# Patient Record
Sex: FEMALE | Race: ASIAN | NOT HISPANIC OR LATINO | Employment: FULL TIME | URBAN - METROPOLITAN AREA
[De-identification: names, ages, dates, MRNs, and addresses within clinical notes are randomized per-mention and may not be internally consistent; named-entity substitution may affect disease eponyms.]

---

## 2017-09-06 ENCOUNTER — CARE COORDINATION (OUTPATIENT)
Dept: FAMILY MEDICINE | Facility: CLINIC | Age: 22
End: 2017-09-06

## 2017-09-06 ENCOUNTER — OFFICE VISIT (OUTPATIENT)
Dept: FAMILY MEDICINE | Facility: CLINIC | Age: 22
End: 2017-09-06

## 2017-09-06 VITALS
BODY MASS INDEX: 30.18 KG/M2 | DIASTOLIC BLOOD PRESSURE: 77 MMHG | HEART RATE: 73 BPM | HEIGHT: 62 IN | WEIGHT: 164 LBS | OXYGEN SATURATION: 99 % | SYSTOLIC BLOOD PRESSURE: 117 MMHG | TEMPERATURE: 97.9 F

## 2017-09-06 DIAGNOSIS — Z71.6 ENCOUNTER FOR TOBACCO USE CESSATION COUNSELING: ICD-10-CM

## 2017-09-06 DIAGNOSIS — N91.2 AMENORRHEA: Primary | ICD-10-CM

## 2017-09-06 DIAGNOSIS — Z34.01 ENCOUNTER FOR SUPERVISION OF NORMAL FIRST PREGNANCY IN FIRST TRIMESTER: ICD-10-CM

## 2017-09-06 PROBLEM — F17.200 TOBACCO USE DISORDER: Status: ACTIVE | Noted: 2017-09-06

## 2017-09-06 LAB — HCG UR QL: POSITIVE

## 2017-09-06 RX ORDER — PRENATAL VIT/IRON FUM/FOLIC AC 27MG-0.8MG
1 TABLET ORAL DAILY
COMMUNITY
End: 2017-11-06

## 2017-09-06 NOTE — PATIENT INSTRUCTIONS
- For nausea, you can try reji. If symptoms are persistent and bothersome, you can call for a medicine. Try eating small frequent meals.   - Continue your multivitamin. Make sure it has folic acid.   - Please try to quit smoking for your health and baby's health.   - Please call if you have any questions.     [facility] Information  If you have any further concerns or wish to schedule another appointment, please call our office at [phone] during normal business hours (8-5, M-F).     For uncomplicated pregnancies, you will be seeing your doctor once a month until you are 28 weeks, then you will see your doctor twice a month. You will begin weekly visits at 36 weeks until you deliver.     If you have urgent medical questions that cannot wait, you may call [phone] at any time of day. Call your doctor if you experience any bleeding or abdominal cramping early in pregnancy.     If you have a medical emergency, please call [phone].  Tips to Relieve Nausea  Although nausea can occur at any time of the day, it may be worse in the morning. To help prevent nausea:  Eat small, light meals at frequent intervals.  Get up slowly. Eat a few unsalted crackers before you get out of bed.  Drink water with lemon slices or 7-up to soothe your stomach.  Eat an ice pop in your favorite flavor.  Ask your health care provider about taking reji or vitamin B6 for nausea and vomiting.  Talk with your health care provider if you take vitamins that upset your stomach.  Safe Medications  Take one prenatal vitamin with at least 400 mcg of folic acid daily.  Use acetaminophen (Tylenol) for pain.   Try Maalox or Tums for heartburn. If these are not working, talk to your doctor about trying a different medication.  Diphenhydramine (Benadryl) can also be used safely in pregnancy.  Talk to your doctor about any other medications or vitamins you are taking!  Start Healthy Habits Now  What matters most is protecting your baby from  this moment on. If you smoke, drink alcohol, or use drugs, now is the time to stop. If you need help, talk with your health care provider.  Smoking increases the risk of miscarriage or having a low-birth-weight baby. If you smoke, quit now.  Alcohol and drugs have been linked with miscarriage, birth defects, intellectual disability, and low birth weight. Do not drink alcohol or take drugs.  Continue your current exercise routine, or start one with walking, swimming, and other low impact exercises. Yoga specifically designed for pregnant moms is a great stress and pain reliever. Keep your self well hydrated and avoid overheating with all activity. If bleeding, fluid leakage, or cramping/contractions occur, stop the exercise and call your doctor.   Wear your seatbelt every time you are in the car. Fasten the lap part underneath your growing belly and the shoulder part as you normally would.   Weight Gain  Add an additional 300 to 400 calories a day into your diet.  Ideal weight gain is 25 to 35 pounds.  If your BMI is over 30, your ideal weight gain is 15 pounds.  If your BMI is under 20, your ideal weight gain is 40 pounds.  Talk to your doctor if you are concerned about weight gain.   Keep Your Environment Save  You can still clean house and use scented products. Just take some simple precautions:  Wear gloves when using cleaning fluids.  Open windows to let in fresh air. Use a fan if you paint.  Avoid secondhand smoke.  Don t breathe fumes from nail polish, hair spray, cleansers, or other chemicals.  Work Concerns  The end of the first trimester is a good time to discuss working during pregnancy with your employer. Follow your health care provider s advice if your job requires you to stand for a long time, work with hazardous tools, or even sit at a desk all day. Your workspace, workload, or scheduled hours may need to be adjusted - perhaps you can change body postures more often or take an extra  break.  Intimacy  Unless your health care provider tells you to, there is no reason to stop having sex while you re pregnant. You or your partner may notice changes in desire. Desire may be less in the first trimester, due to nausea and fatigue. In the second trimester, sex may be very enjoyable. The third trimester can be a challenge comfort-wise. Try different positions and see what s best for you both. As always, let your doctor know if you experience any bleeding, leakage of fluids, or cramping/contractions.  Other Pregnancy Concerns  Limit the amount of radiation you are exposed to. Always tell the radiology technologist that you are pregnant if having an xray or CT scan done.   Practice good hand washing to prevent infection.  Avoid cat litter.   Wash all fruits and vegetabes. Always cook meat thoroughly and do not eat raw fish. Do not eat more than 6 to 12 ounces of other fish sources.   Limit caffeine to less than 300 milligrams a days. The average cup of coffee as approximately 120 milligrams of caffeine.      Breastfeeding: a Healthy Option for You and Your Baby    The choice of how you will feed your baby is important. Before your baby s birth, you ll want to learn about the benefits of breastfeeding. [facility] have been designated Baby Friendly; an initiative that was created by the World Health Organization and UNICEF. This helps give you and your baby the best start in feeding their baby.    Why should I breastfeed my baby?    Babies are less likely to develop childhood obesity or diabetes    Babies are less likely to suffer from recurrent ear infections    Babies are less likely to be hospitalized for respiratory conditions    Breast milk is rich in nutrients and antibodies-it is easy to digest    How does it benefit me?    Lowers the risk for diabetes, breast and ovarian cancer and postpartum depression    Moms can lose  baby weight  more quickly    Cost savings - formula can cost well  over $1,500 per year    Convenient - no bottles and nipples to sterilize, no measuring and mixing formula    The physical contact with breastfeeding can make babies feel secure, warm and comforted     What about formula?  While you and your baby are staying with us at [facility], we will support whatever feeding choice you make for your baby.      Some important considerations:      The American Academy of Pediatrics, the World Health Organization, and many more organizations recommend exclusive breastfeeding for 6 months and continued breastfeeding while adding other foods for the first 1-2 years.      Any amount of breastmilk has benefits to both baby and mother.    Giving formula in replacement of breastfeeding can affect mother s milk supply.  If formula is needed, hospital staff will work with you on a plan to help develop your milk supply.    Formula alters the natural growth of good bacteria in the  stomach.     Research has found that first time mothers who offer formula in the hospital have a shorter duration of breastfeeding.    How can I start to prepare?     Start by having a conversation with your medical provider.     Talk with your partner, family and friends.     Attend a prenatal class that includes breastfeeding preparation. Birth and breastfeeding classes are offered by [facility]. Visit [employer] for class information.     After your baby s birth, hospital staff and lactation consultants will help you and your baby get off to a great start with breastfeeding.    Resources:      [facility] clinic and hospital staff will support you throughout your pregnancy, delivery and beyond. Visit [email or web address] for more details.       A free weekly pregnancy and parenting email is offered by [facility]. Emails include week by week information about your pregnancy, baby s development, breastfeeding and beyond.  Sign up at  [email or web address].      [facility] [phone].  Consultants are available for assessment of your breastfeeding concerns, support and education.      [employer] helps mothers worldwide to breastfeed through mother-to-mother support, encouragement, information and education. [employer] promotes breastfeeding as an important element in the healthy development of baby and mother. Monthly classes, support groups and telephone help are offered in your community. To learn more visit [email or web address].      [facility] Care Connection: [phone]-[facility] ([phone])

## 2017-09-06 NOTE — MR AVS SNAPSHOT
After Visit Summary   9/6/2017    Rashmi Oliva    MRN: [id number]           Patient Information     Date Of Birth          1995        Visit Information        Provider Department      9/6/2017 9:20 AM Caridad House, DO [facility]        Today's Diagnoses     Amenorrhea    -  1    Encounter for supervision of normal first pregnancy in first trimester          Care Instructions    - For nausea, you can try reji. If symptoms are persistent and bothersome, you can call for a medicine. Try eating small frequent meals.   - Continue your multivitamin. Make sure it has folic acid.   - Please try to quit smoking for your health and baby's health.   - Please call if you have any questions.     [facility] Information  If you have any further concerns or wish to schedule another appointment, please call our office at [phone] during normal business hours (8-5, M-F).     For uncomplicated pregnancies, you will be seeing your doctor once a month until you are 28 weeks, then you will see your doctor twice a month. You will begin weekly visits at 36 weeks until you deliver.     If you have urgent medical questions that cannot wait, you may call [phone] at any time of day. Call your doctor if you experience any bleeding or abdominal cramping early in pregnancy.     If you have a medical emergency, please call [phone].  Tips to Relieve Nausea  Although nausea can occur at any time of the day, it may be worse in the morning. To help prevent nausea:  Eat small, light meals at frequent intervals.  Get up slowly. Eat a few unsalted crackers before you get out of bed.  Drink water with lemon slices or 7-up to soothe your stomach.  Eat an ice pop in your favorite flavor.  Ask your health care provider about taking reji or vitamin B6 for nausea and vomiting.  Talk with your health care provider if you take vitamins that upset your stomach.  Safe Medications  Take one prenatal vitamin with  at least 400 mcg of folic acid daily.  Use acetaminophen (Tylenol) for pain.   Try Maalox or Tums for heartburn. If these are not working, talk to your doctor about trying a different medication.  Diphenhydramine (Benadryl) can also be used safely in pregnancy.  Talk to your doctor about any other medications or vitamins you are taking!  Start Healthy Habits Now  What matters most is protecting your baby from this moment on. If you smoke, drink alcohol, or use drugs, now is the time to stop. If you need help, talk with your health care provider.  Smoking increases the risk of miscarriage or having a low-birth-weight baby. If you smoke, quit now.  Alcohol and drugs have been linked with miscarriage, birth defects, intellectual disability, and low birth weight. Do not drink alcohol or take drugs.  Continue your current exercise routine, or start one with walking, swimming, and other low impact exercises. Yoga specifically designed for pregnant moms is a great stress and pain reliever. Keep your self well hydrated and avoid overheating with all activity. If bleeding, fluid leakage, or cramping/contractions occur, stop the exercise and call your doctor.   Wear your seatbelt every time you are in the car. Fasten the lap part underneath your growing belly and the shoulder part as you normally would.   Weight Gain  Add an additional 300 to 400 calories a day into your diet.  Ideal weight gain is 25 to 35 pounds.  If your BMI is over 30, your ideal weight gain is 15 pounds.  If your BMI is under 20, your ideal weight gain is 40 pounds.  Talk to your doctor if you are concerned about weight gain.   Keep Your Environment Save  You can still clean house and use scented products. Just take some simple precautions:  Wear gloves when using cleaning fluids.  Open windows to let in fresh air. Use a fan if you paint.  Avoid secondhand smoke.  Don t breathe fumes from nail polish, hair spray, cleansers, or other chemicals.  Work  Concerns  The end of the first trimester is a good time to discuss working during pregnancy with your employer. Follow your health care provider s advice if your job requires you to stand for a long time, work with hazardous tools, or even sit at a desk all day. Your workspace, workload, or scheduled hours may need to be adjusted - perhaps you can change body postures more often or take an extra break.  Intimacy  Unless your health care provider tells you to, there is no reason to stop having sex while you re pregnant. You or your partner may notice changes in desire. Desire may be less in the first trimester, due to nausea and fatigue. In the second trimester, sex may be very enjoyable. The third trimester can be a challenge comfort-wise. Try different positions and see what s best for you both. As always, let your doctor know if you experience any bleeding, leakage of fluids, or cramping/contractions.  Other Pregnancy Concerns  Limit the amount of radiation you are exposed to. Always tell the radiology technologist that you are pregnant if having an xray or CT scan done.   Practice good hand washing to prevent infection.  Avoid cat litter.   Wash all fruits and vegetabes. Always cook meat thoroughly and do not eat raw fish. Do not eat more than 6 to 12 ounces of other fish sources.   Limit caffeine to less than 300 milligrams a days. The average cup of coffee as approximately 120 milligrams of caffeine.      Breastfeeding: a Healthy Option for You and Your Baby    The choice of how you will feed your baby is important. Before your baby s birth, you ll want to learn about the benefits of breastfeeding. [facility] have been designated Baby Friendly; an initiative that was created by the World Health Organization and UNICEF. This helps give you and your baby the best start in feeding their baby.    Why should I breastfeed my baby?    Babies are less likely to develop childhood obesity or diabetes    Babies  are less likely to suffer from recurrent ear infections    Babies are less likely to be hospitalized for respiratory conditions    Breast milk is rich in nutrients and antibodies-it is easy to digest    How does it benefit me?    Lowers the risk for diabetes, breast and ovarian cancer and postpartum depression    Moms can lose  baby weight  more quickly    Cost savings - formula can cost well over $1,500 per year    Convenient - no bottles and nipples to sterilize, no measuring and mixing formula    The physical contact with breastfeeding can make babies feel secure, warm and comforted     What about formula?  While you and your baby are staying with us at [facility], we will support whatever feeding choice you make for your baby.      Some important considerations:      The American Academy of Pediatrics, the World Health Organization, and many more organizations recommend exclusive breastfeeding for 6 months and continued breastfeeding while adding other foods for the first 1-2 years.      Any amount of breastmilk has benefits to both baby and mother.    Giving formula in replacement of breastfeeding can affect mother s milk supply.  If formula is needed, hospital staff will work with you on a plan to help develop your milk supply.    Formula alters the natural growth of good bacteria in the  stomach.     Research has found that first time mothers who offer formula in the hospital have a shorter duration of breastfeeding.    How can I start to prepare?     Start by having a conversation with your medical provider.     Talk with your partner, family and friends.     Attend a prenatal class that includes breastfeeding preparation. Birth and breastfeeding classes are offered by [facility]. Visit [employer] for class information.     After your baby s birth, hospital staff and lactation consultants will help you and your baby get off to a great start with  breastfeeding.    Resources:      [facility] clinic and hospital staff will support you throughout your pregnancy, delivery and beyond. Visit [email or web address] for more details.       A free weekly pregnancy and parenting email is offered by [facility]. Emails include week by week information about your pregnancy, baby s development, breastfeeding and beyond.  Sign up at [email or web address].      [facility] [phone].  Consultants are available for assessment of your breastfeeding concerns, support and education.      [employer] helps mothers worldwide to breastfeed through mother-to-mother support, encouragement, information and education. [employer] promotes breastfeeding as an important element in the healthy development of baby and mother. Monthly classes, support groups and telephone help are offered in your community. To learn more visit [email or web address].      [facility] Connection: [phone] ([phone])            Follow-ups after your visit        Follow-up notes from your care team     Return in about 1 week (around 9/13/2017) for dating ultrasound and new OB visit .      Who to contact     Please call your clinic at [phone] to:    Ask questions about your health    Make or cancel appointments    Discuss your medicines    Learn about your test results    Speak to your doctor   If you have compliments or concerns about an experience at your clinic, or if you wish to file a complaint, please contact [facility] Patient Relations at [phone] or email us at [email or web address]         Additional Information About Your Visit        MyChart Information     [employer] [facility] [employer] is an electronic gateway that provides easy, online access to your medical records. With [employer], you can request a clinic appointment, read your test results, renew a prescription or communicate with your care team.     To sign up for [employer] visit  "the website at [email or web address]   You will be asked to enter the access code listed below, as well as some personal information. Please follow the directions to create your username and password.     Your access code is: [email or web address]  Expires: 2017  9:35 AM     Your access code will  in 90 days. If you need help or a new code, please contact your [facility] or call [phone] for assistance.        Care EveryWhere ID     This is your Care EveryWhere ID. This could be used by other organizations to access your [facility] medical records  [id number]        Your Vitals Were     Pulse Temperature Height Last Period Pulse Oximetry BMI (Body Mass Index)    73 97.9  F (36.6  C) (Oral) 5' 1.81\" (157 cm) 2017 99% 30.18 kg/m2       Blood Pressure from Last 3 Encounters:   17 117/77    Weight from Last 3 Encounters:   17 164 lb (74.4 kg)              We Performed the Following     HCG Qualitative Urine (UPT)  ([facility])        Primary Care Provider Office Phone # Fax #    Caridad Munoz HouseDO [phone]       [facility] [address] [place] [address]        Equal Access to Services     ROBERT CASTORENA : Hadii aad ku hadasho Soomaali, waaxda luqadaha, qaybta kaalmada adeegyada, waxay idiin hayaan adesamanta kharaken la'aan . So [facility] [phone].    ATENCIÓN: Si habla español, tiene a busch disposición servicios gratuitos de asistencia lingüística. Llame al [phone].    We comply with applicable federal civil rights laws and [place] laws. We do not discriminate on the basis of race, color, national origin, age, disability sex, sexual orientation or gender identity.            Thank you!     Thank you for choosing [facility]  for your care. Our goal is always to provide you with excellent care. Hearing back from our patients is one way we can continue to improve our services. Please take a few minutes to complete the " written survey that you may receive in the mail after your visit with us. Thank you!             Your Updated Medication List - Protect others around you: Learn how to safely use, store and throw away your medicines at www.disposemymeds.org.          This list is accurate as of: 9/6/17 10:37 AM.  Always use your most recent med list.                   Brand Name Dispense Instructions for use Diagnosis    prenatal multivitamin plus iron 27-0.8 MG Tabs per tablet      Take 1 tablet by mouth daily
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
## 2017-09-07 NOTE — PROGRESS NOTES
Preceptor Attestation:  Patient's case reviewed and discussed with Caridad House, DO resident, and I evaluated the patient. I agree with written assessment and plan of care.    Supervising Physician:  Ailyn Mendieta   [facility]
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
## 2017-09-14 DIAGNOSIS — Z34.01 ENCOUNTER FOR SUPERVISION OF NORMAL FIRST PREGNANCY IN FIRST TRIMESTER: ICD-10-CM

## 2017-09-21 ENCOUNTER — DOCUMENTATION ONLY (OUTPATIENT)
Dept: FAMILY MEDICINE | Facility: CLINIC | Age: 22
End: 2017-09-21

## 2017-09-21 NOTE — PROGRESS NOTES
Please call Jason Caraballo,    Your dating ultrasound results have returned. Based on your ultrasound, your due date will be May 3, 2018. Today (9/21/17), you are 8 weeks and 0 days. This differs from your period dating.   Please let me know if you have any questions or concerns.     Sincerely,   Caridad House, DO

## 2017-09-22 ENCOUNTER — OFFICE VISIT (OUTPATIENT)
Dept: FAMILY MEDICINE | Facility: CLINIC | Age: 22
End: 2017-09-22

## 2017-09-22 VITALS
OXYGEN SATURATION: 98 % | HEART RATE: 68 BPM | SYSTOLIC BLOOD PRESSURE: 104 MMHG | BODY MASS INDEX: 28.35 KG/M2 | TEMPERATURE: 97.4 F | DIASTOLIC BLOOD PRESSURE: 70 MMHG | HEIGHT: 63 IN | WEIGHT: 160 LBS

## 2017-09-22 DIAGNOSIS — Z34.91 ENCOUNTER FOR SUPERVISION OF NORMAL PREGNANCY IN FIRST TRIMESTER, UNSPECIFIED GRAVIDITY: Primary | ICD-10-CM

## 2017-09-22 PROBLEM — F17.200 TOBACCO USE DISORDER: Status: RESOLVED | Noted: 2017-09-06 | Resolved: 2017-09-22

## 2017-09-22 LAB
BILIRUBIN UR: NEGATIVE
BLOOD UR: NEGATIVE
GLUCOSE URINE: NEGATIVE
HEMOGLOBIN: 13.9 G/DL (ref 11.7–15.7)
HIV 1+2 AB+HIV1 P24 AG SERPL QL IA: NEGATIVE
KETONES UR QL: NEGATIVE
LEUKOCYTE ESTERASE UR: ABNORMAL
NITRITE UR QL STRIP: NEGATIVE
PH UR STRIP: 7 [PH] (ref 5–7)
PROTEIN UR: ABNORMAL
SP GR UR STRIP: 1.02
UROBILINOGEN UR STRIP-ACNC: ABNORMAL

## 2017-09-22 NOTE — LETTER
October 5, 2017      Rashmi Oliva  [address] [place] [address]        Dear Jason Caraballo,    Your cervical cancer screen came back negative which is great. We will repeat this in 3 years. Please call the clinic with any further questions or concerns.    Parvin Silva MD    Please see below for your test results.    Resulted Orders   ABO/Rh Type-HML ([employer])   Result Value Ref Range    ABO/Rh(D) O POS     Repeat ABO/Rh Typing (HML) O POS     Narrative    Test performed by:   [facility] [address], [place] [address]   Antibody Screen ([email or web address])   Result Value Ref Range    Antibody Screen Negative Negative    Narrative    Test performed by:   [facility] [address], [place] [address]   Urinalysis([facility])   Result Value Ref Range    Specific Gravity Urine 1.020 1.005 - 1.030    pH Urine 7.0 4.5 - 8.0    Leukocyte Esterase UR Trace (A) NEGATIVE    Nitrite Urine Negative NEGATIVE    Protein UR 1+ (A) NEGATIVE    Glucose Urine Negative NEGATIVE    Ketones Urine Negative NEGATIVE    Urobilinogen mg/dL 1.0 E.U./dL (A) 0.2 E.U./dL    Bilirubin UR Negative NEGATIVE    Blood UR Negative NEGATIVE   Hemoglobin (HGB) ([facility])   Result Value Ref Range    Hemoglobin 13.9 11.7 - 15.7 g/dL   Hepatitis B Surface Ag ([employer])   Result Value Ref Range    Hepatitis B Surface Antigen Negative Negative    Narrative    Test performed by:  [facility]  [address] [place] [address]   Syphilis Screen [place] ([email or web address])   Result Value Ref Range    Syphilis Screen [place] Non-Reactive Non-Reactive    Narrative    Test performed by:  [facility]  [address] [place] [address]   HIV Ag/Ab Screen [place] ([email or web address])   Result Value Ref Range    HIV Antigen/Antibody Negative Negative    Narrative    Test performed by:  [facility] [address] [place] [address]   Culture Urine ([email or web address])   Result Value Ref Range    Culture SEE RESULTS  BELOW       Comment:      CULTURE, URINE   SOURCE: Urine, Clean Catch   CULTURE RESULTS:    No Growth      Narrative    Test performed by:  [facility] [address] [place] [address]   Rubella  IgG ([facility])   Result Value Ref Range    Rubella IgG Immune Immune    Narrative    Test performed by:  [facility] [address] [place] [address]   Chlamydia/Gono Amplified ([facility])   Result Value Ref Range    Chlamydia trac,Amplified Prb Negative Negative    N gonorrhoeae,Amplified Prb Negative Negative    Narrative    Test performed by:  [facility] [address] [place] [address]   GYN Cytology ([facility])   Result Value Ref Range    Lab AP Case Report SEE RESULTS BELOW       Comment:      Gynecologic Cytology Report                       Case: [id number]                                     Authorizing Provider:  Rayne Luong MD     Collected:             09/22/2017 1009              First Screen:          ALHAJI Jameson   Received:              09/25/2017 1015                                     (ASCP)                                                                         Specimen:    SUREPATH PAP, SCREENING, Endocervical/cervical                                               Lab AP Gyn Interpretation SEE RESULTS BELOW       Comment:      Negative for squamous intraepithelial lesion or malignancy  Electronically signed by ALHAJI Jameson (ASCP) on 10/2/2017 at  1:16   PM      Lab AP Malignant? Normal Normal    Specimen adequacy:       Satisfactory for eval, endocerv/transform zone component absent, patient pregnant    HPV Reflex? Yes if Abnormal     High Risk? No     Last Mens Period 7/18/17     Lab AP Abnormal Bleeding No     Lab AP Patient Status Pregnant     Lab AP Birth Control/Hormones None     Lab AP Previous Normal none     Lab AP Previous Abnl none     Lab AP Cervical Appearance Healthy     Narrative    Test performed  by:  [facility]  [address] [place] [address]       If you have any questions, please call the clinic to make an appointment.    Sincerely,    Parvin Silva MD

## 2017-09-22 NOTE — MR AVS SNAPSHOT
After Visit Summary   2017    Rashmi Oliva    MRN: [id number]           Patient Information     Date Of Birth          1995        Visit Information        Provider Department      2017 9:00 AM Parvin Silva MD [facility]        Today's Diagnoses     Encounter for supervision of normal pregnancy in first trimester, unspecified     -  1       Follow-ups after your visit        Follow-up notes from your care team     Return in about 4 weeks (around 10/20/2017) for GODWIN.      Your next 10 appointments already scheduled     2017  1:40 PM CDT   RETURN OB with Parvin Silva MD   [facility] ([facility])    [address] [place] [address]   [phone]              Who to contact     Please call your clinic at [phone] to:    Ask questions about your health    Make or cancel appointments    Discuss your medicines    Learn about your test results    Speak to your doctor   If you have compliments or concerns about an experience at your clinic, or if you wish to file a complaint, please contact [facility] Patient Relations at [phone] or email us at [email or web address]         Additional Information About Your Visit        MyChart Information     [employer] is an electronic gateway that provides easy, online access to your medical records. With [employer], you can request a clinic appointment, read your test results, renew a prescription or communicate with your care team.     To sign up for [employer] visit the website at [email or web address]   You will be asked to enter the access code listed below, as well as some personal information. Please follow the directions to create your username and password.     Your access code is: [email or web address]  Expires: 2017  9:35 AM     Your access code will  in 90 days. If you need help or a new code, please contact your [facility]  [facility] [facility] or call [phone] for assistance.        Care EveryWhere ID     This is your Care EveryWhere ID. This could be used by other organizations to access your [place] medical records  [id number]        Your Vitals Were     Pulse Temperature Height Last Period Pulse Oximetry BMI (Body Mass Index)    68 97.4  F (36.3  C) (Oral) 5' 2.5\" (158.8 cm) 07/18/2017 98% 28.8 kg/m2       Blood Pressure from Last 3 Encounters:   09/22/17 104/70   09/06/17 117/77    Weight from Last 3 Encounters:   09/22/17 160 lb (72.6 kg)   09/06/17 164 lb (74.4 kg)              We Performed the Following     ABO/Rh Type-HML ([employer])     Antibody Screen ("[facility])     Chlamydia/Gono Amplified ([employer])     Culture Urine ([employer])     GYN Cytology ([facility])     Hemoglobin (HGB) ([facility])     Hepatitis B Surface Ag ([employer])     HIV Ag/Ab Screen [place] ([facility])     Rubella  IgG ([facility])     Syphilis Screen [place] ([facility])     Urinalysis([facility])        Primary Care Provider Office Phone # Fax #    Caridad Munoz DO [place] [phone]       [facility] [address] [place] [address]        Equal Access to Services     ROBERT CASTORENA AH: Hadii aad ku hadasho Soomaali, waaxda luqadaha, qaybta kaalmada adeegyada, waxay idiin hayaan nini tabares . So [facility] [phone].    ATENCIÓN: Si habla español, tiene a busch disposición servicios gratuitos de asistencia lingüística. Llame al [phone].    We comply with applicable federal civil rights laws and [place] laws. We do not discriminate on the basis of race, color, national origin, age, disability sex, sexual orientation or gender identity.            Thank you!     Thank you for choosing [facility]  for your care. Our goal is always to provide you with excellent care. Hearing back from our patients is one way we can continue to improve our services. Please take a few minutes to complete the " written survey that you may receive in the mail after your visit with us. Thank you!             Your Updated Medication List - Protect others around you: Learn how to safely use, store and throw away your medicines at www.disposemymeds.org.          This list is accurate as of: 9/22/17 11:59 PM.  Always use your most recent med list.                   Brand Name Dispense Instructions for use Diagnosis    prenatal multivitamin plus iron 27-0.8 MG Tabs per tablet      Take 1 tablet by mouth daily
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
## 2017-09-22 NOTE — PROGRESS NOTES
Preceptor Attestation:  Patient's case reviewed and discussed with Parvin Silva MD.  Patient seen and discussed with the resident.  I agree with assessment and plan of care.  Supervising Physician:  Rayne Luong MD  [facility]

## 2017-09-23 LAB
BLD GP AB SCN SERPL QL: NEGATIVE
CULTURE: NORMAL
HBV SURFACE AG SERPL QL IA: NEGATIVE

## 2017-09-25 LAB
ABO + RH BLD: NORMAL
C TRACH RRNA CVX QL NAA+PROBE: NEGATIVE
N GONORRHOEA RRNA SPEC QL NAA+PROBE: NEGATIVE
REPEAT ABO/RH TYPING (HML): NORMAL
RPR SER QL: NORMAL
RUBV IGG SERPL QL IA: NORMAL

## 2017-10-02 ENCOUNTER — RECORDS - HEALTHEAST (OUTPATIENT)
Dept: ADMINISTRATIVE | Facility: OTHER | Age: 22
End: 2017-10-02

## 2017-10-02 LAB
CYTOLOGY CVX/VAG DOC THIN PREP: NORMAL
HIGH RISK?: NO
HPV REFLEX?: NORMAL
LAB AP ABNORMAL BLEEDING: NO
LAB AP BIRTH CONTROL/HORMONES: NORMAL
LAB AP CASE REPORT: NORMAL
LAB AP CERVICAL APPEARANCE: NORMAL
LAB AP MALIGNANT?: NORMAL
LAB AP PATIENT STATUS: NORMAL
LAB AP PREVIOUS ABNL: NORMAL
LAB AP PREVIOUS NORMAL: NORMAL
LAST MENS PERIOD: NORMAL
SPECIMEN ADEQUACY:: NORMAL

## 2017-11-06 ENCOUNTER — OFFICE VISIT (OUTPATIENT)
Dept: FAMILY MEDICINE | Facility: CLINIC | Age: 22
End: 2017-11-06

## 2017-11-06 VITALS
SYSTOLIC BLOOD PRESSURE: 116 MMHG | OXYGEN SATURATION: 98 % | BODY MASS INDEX: 28.55 KG/M2 | WEIGHT: 158.6 LBS | TEMPERATURE: 97.5 F | HEART RATE: 63 BPM | DIASTOLIC BLOOD PRESSURE: 77 MMHG

## 2017-11-06 DIAGNOSIS — Z34.81 ENCOUNTER FOR SUPERVISION OF OTHER NORMAL PREGNANCY IN FIRST TRIMESTER: Primary | ICD-10-CM

## 2017-11-06 DIAGNOSIS — Z23 NEEDS FLU SHOT: ICD-10-CM

## 2017-11-06 RX ORDER — PYRIDOXINE HCL (VITAMIN B6) 25 MG
25 TABLET ORAL AT BEDTIME
Qty: 60 TABLET | Refills: 3 | Status: SHIPPED | OUTPATIENT
Start: 2017-11-06 | End: 2018-01-24

## 2017-11-06 RX ORDER — ASPIRIN 81 MG
100 TABLET, DELAYED RELEASE (ENTERIC COATED) ORAL DAILY
Qty: 120 TABLET | Refills: 6 | Status: SHIPPED | OUTPATIENT
Start: 2017-11-06 | End: 2018-01-24

## 2017-11-06 RX ORDER — PNV NO.95/FERROUS FUM/FOLIC AC 28MG-0.8MG
1 TABLET ORAL DAILY
Qty: 90 TABLET | Refills: 11 | Status: SHIPPED | OUTPATIENT
Start: 2017-11-06 | End: 2018-08-08

## 2017-11-06 NOTE — PROGRESS NOTES
SUBJECTIVE  No vaginal bleeding, loss of fluids, or cramping noted. No headaches, change in vision, RUQ pain, or extremity edema. Patient has not felt baby move yet. Difficulty with nausea and eating regular meals a day. She has been taking her pre-gianni vitamin.    Constipation: constipated with some bleeding from likely internal hemorrhoids.    OBJECTIVE  /77  Pulse 63  Temp 97.5  F (36.4  C) (Oral)  Wt 158 lb 9.6 oz (71.9 kg)  LMP 2017  SpO2 98%  BMI 28.55 kg/m2    General: sitting up in a chair awake and alert. Pleasant and cooperative in NAD.  HEENT: EOMI, mucus membranes moist  Cardio: RRR  Resp: clear vesicular breath sounds bilaterally, no increased work of breathing  Abd: gravid, non-tender  MSK: moving all extremities w/o difficulty  Skin: no lesions or rashes  Psych: mood good, affect congruent. Mentation and conversation appropriate.    ASSESSMENT/PLAN  Patient is a  a 22 year old  female at 14w4d corresponding to DARLENE of  May 3, 2018 obtained via first trimester ultrasound.  - prenatal labs complete:  -->O positive, antibody negative, Hgb 13.9  - TWG: -5lbs. Encouraged to eat smaller more frequent meals. Also prescribed pyridoxine.  - flu vaccine given   - planning on vaginal delivery  - breast feeding    Precepted with: MD Parvin Tinajero MD (PGY2)  Pager: [phone]  [facility] Res[facility]

## 2017-11-06 NOTE — MR AVS SNAPSHOT
After Visit Summary   2017    Rashmi Oliva    MRN: [id number]           Patient Information     Date Of Birth          1995        Visit Information        Provider Department      2017 9:00 AM Parvin Silva MD [facility]        Today's Diagnoses     Encounter for supervision of other normal pregnancy in first trimester    -  1    Needs flu shot          Care Instructions    For the stool softener, take 1 pill 1 time a day. If not improving, take 1 pill 2 times a day.          Follow-ups after your visit        Your next 10 appointments already scheduled     Dec 05, 2017  2:20 PM CST   RETURN OB with Parvin Silva MD   [facility] ([facility])    [address] [place] [address]   [phone]              Who to contact     Please call your clinic at [phone] to:    Ask questions about your health    Make or cancel appointments    Discuss your medicines    Learn about your test results    Speak to your doctor   If you have compliments or concerns about an experience at your clinic, or if you wish to file a complaint, please contact [facility] Patient Relations at [phone] or email us at [email or web address]         Additional Information About Your Visit        MyChart Information     [employer] is an electronic gateway that provides easy, online access to your medical records. With [employer], you can request a clinic appointment, read your test results, renew a prescription or communicate with your care team.     To sign up for [employer] visit the website at [email or web address]   You will be asked to enter the access code listed below, as well as some personal information. Please follow the directions to create your username and password.     Your access code is: [email or web address]  Expires: 2017  8:35 AM     Your access code will  in 90 days. If you need help or a new code, please  contact your [facility] or call [phone] for assistance.        Care EveryWhere ID     This is your Care EveryWhere ID. This could be used by other organizations to access your [facility] medical records  [id number]        Your Vitals Were     Pulse Temperature Last Period Pulse Oximetry BMI (Body Mass Index)       63 97.5  F (36.4  C) (Oral) 07/18/2017 98% 28.55 kg/m2        Blood Pressure from Last 3 Encounters:   11/06/17 116/77   09/22/17 104/70   09/06/17 117/77    Weight from Last 3 Encounters:   11/06/17 158 lb 9.6 oz (71.9 kg)   09/22/17 160 lb (72.6 kg)   09/06/17 164 lb (74.4 kg)              We Performed the Following     ADMIN VACCINE, INITIAL     FLU VAC PRESRV FREE QUAD SPLIT VIR IM, 0.5 mL dosage          Today's Medication Changes          These changes are accurate as of: 11/6/17 11:59 PM.  If you have any questions, ask your nurse or doctor.               Start taking these medicines.        Dose/Directions    docusate sodium 100 MG tablet   Commonly known as:  COLACE   Used for:  Encounter for supervision of other normal pregnancy in first trimester   Started by:  Parvin Silva MD        Dose:  100 mg   Take 100 mg by mouth daily   Quantity:  120 tablet   Refills:  6       doxylamine 25 MG Tabs tablet   Commonly known as:  UNISOM   Used for:  Encounter for supervision of other normal pregnancy in first trimester   Started by:  Parvin Silva MD        Dose:  25 mg   Take 1 tablet (25 mg) by mouth At Bedtime   Quantity:  30 each   Refills:  3       Prenatal Vitamins 28-0.8 MG Tabs   Used for:  Encounter for supervision of other normal pregnancy in first trimester   Replaces:  prenatal multivitamin plus iron 27-0.8 MG Tabs per tablet   Started by:  Parvin Silva MD        Dose:  1 tablet   Take 1 tablet by mouth daily   Quantity:  90 tablet   Refills:  11       pyridOXINE 25 MG tablet   Commonly known as:  vitamin B-6   Used for:   Encounter for supervision of other normal pregnancy in first trimester   Started by:  Parvin Silva MD        Dose:  25 mg   Take 1 tablet (25 mg) by mouth At Bedtime   Quantity:  60 tablet   Refills:  3         Stop taking these medicines if you haven't already. Please contact your care team if you have questions.     prenatal multivitamin plus iron 27-0.8 MG Tabs per tablet   Replaced by:  Prenatal Vitamins 28-0.8 MG Tabs   Stopped by:  Parvin Silva MD                Where to get your medicines      These medications were sent to [id number] - [place], [place] - [address], [place] [address]     Phone:  [phone]     docusate sodium 100 MG tablet    doxylamine 25 MG Tabs tablet    Prenatal Vitamins 28-0.8 MG Tabs    pyridOXINE 25 MG tablet                Primary Care Provider Office Phone # Fax #    Caridad Munoz HouseDO [phone]       [facility] [address] [facility] [address]        Equal Access to Services     LUIS FERNANDO CASTORENA AH: Hadii aad ku hadasho Soomaali, waaxda luqadaha, qaybta kaalmada adeegyada, waxay maryamin hayericn nini tabares . So [facility] [phone].    ATENCIÓN: Si habla español, tiene a busch disposición servicios gratuitos de asistencia lingüística. [facility] [phone].    We comply with applicable federal civil rights laws and [place] laws. We do not discriminate on the basis of race, color, national origin, age, disability, sex, sexual orientation, or gender identity.            Thank you!     Thank you for choosing [facility]  for your care. Our goal is always to provide you with excellent care. Hearing back from our patients is one way we can continue to improve our services. Please take a few minutes to complete the written survey that you may receive in the mail after your visit with us. Thank you!             Your Updated Medication  List - Protect others around you: Learn how to safely use, store and throw away your medicines at www.disposemymeds.org.          This list is accurate as of: 11/6/17 11:59 PM.  Always use your most recent med list.                   Brand Name Dispense Instructions for use Diagnosis    docusate sodium 100 MG tablet    COLACE    120 tablet    Take 100 mg by mouth daily    Encounter for supervision of other normal pregnancy in first trimester       doxylamine 25 MG Tabs tablet    UNISOM    30 each    Take 1 tablet (25 mg) by mouth At Bedtime    Encounter for supervision of other normal pregnancy in first trimester       Prenatal Vitamins 28-0.8 MG Tabs     90 tablet    Take 1 tablet by mouth daily    Encounter for supervision of other normal pregnancy in first trimester       pyridOXINE 25 MG tablet    vitamin B-6    60 tablet    Take 1 tablet (25 mg) by mouth At Bedtime    Encounter for supervision of other normal pregnancy in first trimester

## 2017-11-06 NOTE — PROGRESS NOTES
Preceptor Attestation:  Patient's case reviewed and discussed with Parvin Silva MD Patient seen and discussed with the resident.. I agree with assessment and plan of care.  Supervising Physician:  Homer Ma MD  [facility]

## 2017-11-06 NOTE — NURSING NOTE

## 2017-11-06 NOTE — LETTER
RETURN TO WORK FORM    11/6/2017    Re: Rashmi Oliva  1995      To Whom It May Concern:     Rashmi Oliva was seen in clinic today.  She may return to work with restrictions on 11/7/17          Restrictions:   1) Lifting of 10-15 pounds.  2) Standing as tolerated. Rashmi may need to take extra breaks.      Parvin Silva MD  11/6/2017 9:20 AM

## 2017-12-05 ENCOUNTER — OFFICE VISIT (OUTPATIENT)
Dept: FAMILY MEDICINE | Facility: CLINIC | Age: 22
End: 2017-12-05

## 2017-12-05 VITALS
TEMPERATURE: 97.4 F | WEIGHT: 161.4 LBS | HEART RATE: 73 BPM | DIASTOLIC BLOOD PRESSURE: 66 MMHG | BODY MASS INDEX: 29.05 KG/M2 | OXYGEN SATURATION: 98 % | SYSTOLIC BLOOD PRESSURE: 106 MMHG

## 2017-12-05 DIAGNOSIS — Z34.81 ENCOUNTER FOR SUPERVISION OF OTHER NORMAL PREGNANCY IN FIRST TRIMESTER: Primary | ICD-10-CM

## 2017-12-05 NOTE — PROGRESS NOTES
SUBJECTIVE  No vaginal bleeding, loss of fluids, or cramping noted. No headaches, change in vision, RUQ pain, or extremity edema. Patient has started to feel baby move. Nausea in the morning and night, but improving overall. She has been taking her pre-gianni vitamin.    OBJECTIVE  /66  Pulse 73  Temp 97.4  F (36.3  C) (Oral)  Wt 161 lb 6.4 oz (73.2 kg)  LMP 2017  SpO2 98%  BMI 29.05 kg/m2    General: sitting up in a chair awake and alert. Pleasant and cooperative in NAD.  HEENT: EOMI, mucus membranes moist  Cardio: RRR  Resp: clear vesicular breath sounds bilaterally, no increased work of breathing  Abd: gravid, non-tender  MSK: moving all extremities w/o difficulty  Skin: no lesions or rashes  Psych: mood good, affect congruent. Mentation and conversation appropriate.    ASSESSMENT/PLAN  Patient is a  a 22 year old  female at 18w5d corresponding to DARLENE of  May 3, 2018 obtained via first trimester ultrasound.  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 Overall improving. Encouraged to eat smaller more frequent meals; she has been enrolled in [employer]IC and they are also helping her with foods. Unisom at night, pyridoxine in the morning.  - flu vaccine given   - planning on vaginal delivery  - breast feeding     Follow up in 3 weeks for GODWIN visit (~22 weeks, 26 weeks, 28 weeks)    Precepted with: MD Parvin Lam MD (PGY2)  Pager: [phone]  [facility] Res[facility]

## 2017-12-05 NOTE — PROGRESS NOTES
"Preceptor Attestation:  Patient's case reviewed and discussed with Dr Silva. Patient seen and discussed with the resident.\"}.  I agree with written assessment and plan of care.  Supervising Physician:  Ildefonso Vigil MD  [facility]    "

## 2017-12-05 NOTE — MR AVS SNAPSHOT
After Visit Summary   2017    Rashmi Oliva    MRN: [id number]           Patient Information     Date Of Birth          1995        Visit Information        Provider Department      2017 2:20 PM Parvin Silva MD [facility]        Today's Diagnoses     Encounter for supervision of other normal pregnancy in second trimester    -  1      Care Instructions    Take Unisom (doxylamine) at night.  Take Vitamin B6 (pyridoxine) in the morning.  Keep taking your prenatal vitamin every day.          Follow-ups after your visit        Future tests that were ordered for you today     Open Future Orders        Priority Expected Expires Ordered    US OB 14 +WKS SINGLE OR FIRST GESTATION Routine  2019            Who to contact     Please call your clinic at [phone] to:    Ask questions about your health    Make or cancel appointments    Discuss your medicines    Learn about your test results    Speak to your doctor   If you have compliments or concerns about an experience at your clinic, or if you wish to file a complaint, please contact [facility] Patient Relations at [phone] or email us at [email or web address]         Additional Information About Your Visit        MyChart Information     [employer] is an electronic gateway that provides easy, online access to your medical records. With [employer], you can request a clinic appointment, read your test results, renew a prescription or communicate with your care team.     To sign up for [employer] visit the website at [email or web address]   You will be asked to enter the access code listed below, as well as some personal information. Please follow the directions to create your username and password.     Your access code is: [email or web address]  Expires: 3/5/2018  2:59 PM     Your access code will  in 90 days. If you need help or a new code, please contact your [facility]  [place] [facility] or call [phone] for assistance.        Care EveryWhere ID     This is your Care EveryWhere ID. This could be used by other organizations to access your [place] medical records  [id number]        Your Vitals Were     Pulse Temperature Last Period Pulse Oximetry BMI (Body Mass Index)       73 97.4  F (36.3  C) (Oral) 07/18/2017 98% 29.05 kg/m2        Blood Pressure from Last 3 Encounters:   12/05/17 106/66   11/06/17 116/77   09/22/17 104/70    Weight from Last 3 Encounters:   12/05/17 161 lb 6.4 oz (73.2 kg)   11/06/17 158 lb 9.6 oz (71.9 kg)   09/22/17 160 lb (72.6 kg)               Primary Care Provider Office Phone # Fax #    Caridad Houes [phone]       [facility] [address] [place] [address]        Equal Access to Services     ROBERT CASTORENA AH: Hadii aad ku hadasho Soomaali, waaxda luqadaha, qaybta kaalmada adeegyada, waxay idiin hayaan adeeg kharash la'aan ah. So [facility] [phone].    ATENCIÓN: Si habla español, tiene a busch disposición servicios gratuitos de asistencia lingüística. Llame al [phone].    We comply with applicable federal civil rights laws and [place] laws. We do not discriminate on the basis of race, color, national origin, age, disability, sex, sexual orientation, or gender identity.            Thank you!     Thank you for choosing [facility]  for your care. Our goal is always to provide you with excellent care. Hearing back from our patients is one way we can continue to improve our services. Please take a few minutes to complete the written survey that you may receive in the mail after your visit with us. Thank you!             Your Updated Medication List - Protect others around you: Learn how to safely use, store and throw away your medicines at www.disposemymeds.org.          This list is accurate as of: 12/5/17  3:10 PM.  Always use your most recent med list.                   Brand Name  Dispense Instructions for use Diagnosis    docusate sodium 100 MG tablet    COLACE    120 tablet    Take 100 mg by mouth daily    Encounter for supervision of other normal pregnancy in first trimester       doxylamine 25 MG Tabs tablet    UNISOM    30 each    Take 1 tablet (25 mg) by mouth At Bedtime    Encounter for supervision of other normal pregnancy in first trimester       Prenatal Vitamins 28-0.8 MG Tabs     90 tablet    Take 1 tablet by mouth daily    Encounter for supervision of other normal pregnancy in first trimester       pyridOXINE 25 MG tablet    vitamin B-6    60 tablet    Take 1 tablet (25 mg) by mouth At Bedtime    Encounter for supervision of other normal pregnancy in first trimester

## 2017-12-05 NOTE — PATIENT INSTRUCTIONS
Take Unisom (doxylamine) at night.  Take Vitamin B6 (pyridoxine) in the morning.  Keep taking your prenatal vitamin every day.

## 2017-12-10 ENCOUNTER — TELEPHONE (OUTPATIENT)
Dept: FAMILY MEDICINE | Facility: CLINIC | Age: 22
End: 2017-12-10

## 2017-12-10 NOTE — TELEPHONE ENCOUNTER
[facility] After [facility] Phone Call:    I called patient back about abdominal pain:    1.Abdominal pain:   -Pain above the belly in the middle  -Had this pain on Wednesday and it lasted for 2 hours and then went away  -No radiation, 6-7 out of 10, very sharp  -Ate a little bit of mashed potatoes and gravy, pain came on after that about 5 minutes after eating  -No n/v  -No fever  -No diarrhea or constipation, last BM yesterday  -Able to drink water and keep it down    19w3 pregnant  Has been feeling baby move  No vaginal bleeding  No LOF    No dysuria  Did have some heartburn symptoms earlier today. Has not taken any medications.     Abdominal pain:   Differential includes GERD, cholelithiasis, muscular strain. Patient is comfortable waiting at home to see if pain resolves, will plan to call the clinic tomorrow for an appointment. I recommended trial of tums, avoiding spicy foods, avoid fatty foods today and keeping hydrated. We discussed coming to ER if she develops any vaginal bleeding, nausea/vomiting, fever/chills, or if the pain worsens.     Ruma Kurtz MD  [facility] Resident  Pager

## 2017-12-21 DIAGNOSIS — Z34.81 ENCOUNTER FOR SUPERVISION OF OTHER NORMAL PREGNANCY IN FIRST TRIMESTER: ICD-10-CM

## 2017-12-28 ENCOUNTER — OFFICE VISIT (OUTPATIENT)
Dept: FAMILY MEDICINE | Facility: CLINIC | Age: 22
End: 2017-12-28

## 2017-12-28 VITALS
HEIGHT: 62 IN | RESPIRATION RATE: 20 BRPM | SYSTOLIC BLOOD PRESSURE: 119 MMHG | HEART RATE: 81 BPM | TEMPERATURE: 97.7 F | OXYGEN SATURATION: 95 % | BODY MASS INDEX: 30.14 KG/M2 | WEIGHT: 163.8 LBS | DIASTOLIC BLOOD PRESSURE: 74 MMHG

## 2017-12-28 DIAGNOSIS — Z34.82 ENCOUNTER FOR SUPERVISION OF OTHER NORMAL PREGNANCY IN SECOND TRIMESTER: Primary | ICD-10-CM

## 2017-12-28 RX ORDER — CALCIUM CARBONATE 500 MG/1
1 TABLET, CHEWABLE ORAL DAILY
Qty: 150 TABLET | Refills: 3 | Status: SHIPPED | OUTPATIENT
Start: 2017-12-28 | End: 2018-02-13

## 2017-12-28 RX ORDER — PANTOPRAZOLE SODIUM 20 MG/1
20 TABLET, DELAYED RELEASE ORAL DAILY
COMMUNITY
Start: 2017-12-10 | End: 2017-12-28 | Stop reason: ALTCHOICE

## 2017-12-28 NOTE — PROGRESS NOTES
"SUBJECTIVE  No vaginal bleeding, loss of fluids, or cramping noted. No headaches, change in vision, RUQ pain, or extremity edema. Patient continues to feel baby move. She has been able to tolerate a diet with no significant nausea/vomitting. She has been taking her pre-gianni vitamin, stool softener, and B6/doxylamine.    Since last encounter she had an ER visit for epigastric pain. Evaluation and labs there were unremarkable. She was dx with pregnancy induced reflux and prescribed omeprazole but she has not picked it up yet. Pain has not returned.    OBJECTIVE  /74  Pulse 81  Temp 97.7  F (36.5  C) (Oral)  Resp 20  Ht 5' 1.5\" (156.2 cm)  Wt 163 lb 12.8 oz (74.3 kg)  LMP 2017  SpO2 95%  BMI 30.45 kg/m2    General: sitting up in a chair awake and alert. Pleasant and cooperative in NAD.  HEENT: EOMI, mucus membranes moist  Cardio: RRR  Resp: clear vesicular breath sounds bilaterally, no increased work of breathing  Abd: gravid, non-tender  MSK: moving all extremities w/o difficulty  Skin: no lesions or rashes  Psych: mood good, affect congruent. Mentation and conversation appropriate.    ASSESSMENT/PLAN  Patient is a  a 22 year old  female at 22w0d corresponding to DARLENE of  May 3, 2018 obtained via first trimester ultrasound. Expecting baby boy.  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 Has been gaining weight the last 2 visits. Enrolled in [employer] and they are also helping her with foods. Unisom at night, pyridoxine in the morning.  - reflux: TUMS prescribed   - flu vaccine   - planning on vaginal delivery  - breast feeding      Follow up in 4 weeks for GODWIN visit (T, )    Precepted with: MD Parvin Parrish MD (PGY2)  Pager: [phone]  [facility] Res[facility]    "

## 2017-12-28 NOTE — MR AVS SNAPSHOT
After Visit Summary   2017    Rashmi Oliva    MRN: [id number]           Patient Information     Date Of Birth          1995        Visit Information        Provider Department      2017 1:40 PM Parvin Silva MD [facility]        Today's Diagnoses     Encounter for supervision of other normal pregnancy in second trimester    -  1       Follow-ups after your visit        Who to contact     Please call your clinic at [phone] to:    Ask questions about your health    Make or cancel appointments    Discuss your medicines    Learn about your test results    Speak to your doctor   If you have compliments or concerns about an experience at your clinic, or if you wish to file a complaint, please contact [facility] Patient Relations at [phone] or email us at [email or web address]         Additional Information About Your Visit        MyChart Information     [employer] is an electronic gateway that provides easy, online access to your medical records. With Playmysong, you can request a clinic appointment, read your test results, renew a prescription or communicate with your care team.     To sign up for [employer] visit the website at [email or web address]   You will be asked to enter the access code listed below, as well as some personal information. Please follow the directions to create your username and password.     Your access code is: [email or web address]  Expires: 3/5/2018  2:59 PM     Your access code will  in 90 days. If you need help or a new code, please contact your [facility] or call [phone] for assistance.        Care EveryWhere ID     This is your Care EveryWhere ID. This could be used by other organizations to access your [facility] medical records  [id number]        Your Vitals Were     Pulse Temperature Respirations Height Last Period Pulse Oximetry    81 97.7  F (36.5  C) (Oral) 20  "5' 1.5\" (156.2 cm) 07/18/2017 95%    BMI (Body Mass Index)                   30.45 kg/m2            Blood Pressure from Last 3 Encounters:   12/28/17 119/74   12/05/17 106/66   11/06/17 116/77    Weight from Last 3 Encounters:   12/28/17 163 lb 12.8 oz (74.3 kg)   12/05/17 161 lb 6.4 oz (73.2 kg)   11/06/17 158 lb 9.6 oz (71.9 kg)              Today, you had the following     No orders found for display         Today's Medication Changes          These changes are accurate as of: 12/28/17  2:20 PM.  If you have any questions, ask your nurse or doctor.               Start taking these medicines.        Dose/Directions    calcium carbonate 500 MG chewable tablet   Commonly known as:  TUMS   Used for:  Encounter for supervision of other normal pregnancy in second trimester   Started by:  Parvin Silva MD        Dose:  1 chew tab   Take 1 tablet (500 mg) by mouth daily   Quantity:  150 tablet   Refills:  3         Stop taking these medicines if you haven't already. Please contact your care team if you have questions.     pantoprazole 20 MG EC tablet   Commonly known as:  PROTONIX   Stopped by:  Parvin Silva MD                Where to get your medicines      These medications were sent to [id number] - [place], [place] - [address] AT [address], [place] [address]     Phone:  [phone]     calcium carbonate 500 MG chewable tablet                Primary Care Provider Office Phone # Fax #    [place] Tammy House [phone]       [facility] [address] [place] [address] [address] [place] [address]        Equal Access to Services     [facility] KELL AH: Hadii ramiro daniel Soeverardo, waaxda luqadaha, qaybta kaalmada adesamantayada, yesenia sainz. So [facility] [phone].    ATENCIÓN: Si habla español, tiene a busch disposición servicios gratuitos de asistencia lingüística. Llame al [phone].    We comply " with applicable federal civil rights laws and [place] laws. We do not discriminate on the basis of race, color, national origin, age, disability, sex, sexual orientation, or gender identity.            Thank you!     Thank you for choosing [facility]  for your care. Our goal is always to provide you with excellent care. Hearing back from our patients is one way we can continue to improve our services. Please take a few minutes to complete the written survey that you may receive in the mail after your visit with us. Thank you!             Your Updated Medication List - Protect others around you: Learn how to safely use, store and throw away your medicines at www.disposemymeds.org.          This list is accurate as of: 12/28/17  2:20 PM.  Always use your most recent med list.                   Brand Name Dispense Instructions for use Diagnosis    calcium carbonate 500 MG chewable tablet    TUMS    150 tablet    Take 1 tablet (500 mg) by mouth daily    Encounter for supervision of other normal pregnancy in second trimester       docusate sodium 100 MG tablet    COLACE    120 tablet    Take 100 mg by mouth daily    Encounter for supervision of other normal pregnancy in first trimester       doxylamine 25 MG Tabs tablet    UNISOM    30 each    Take 1 tablet (25 mg) by mouth At Bedtime    Encounter for supervision of other normal pregnancy in first trimester       Prenatal Vitamins 28-0.8 MG Tabs     90 tablet    Take 1 tablet by mouth daily    Encounter for supervision of other normal pregnancy in first trimester       pyridOXINE 25 MG tablet    vitamin B-6    60 tablet    Take 1 tablet (25 mg) by mouth At Bedtime    Encounter for supervision of other normal pregnancy in first trimester

## 2017-12-28 NOTE — PROGRESS NOTES
Preceptor Attestation:  Patient's case reviewed and discussed with Parvin Silva MD Patient seen and discussed with the resident.. I agree with assessment and plan of care.  Supervising Physician:  Amrik Krishna MD  [facility]

## 2018-01-24 ENCOUNTER — OFFICE VISIT (OUTPATIENT)
Dept: FAMILY MEDICINE | Facility: CLINIC | Age: 23
End: 2018-01-24
Payer: COMMERCIAL

## 2018-01-24 VITALS
OXYGEN SATURATION: 97 % | WEIGHT: 167.8 LBS | SYSTOLIC BLOOD PRESSURE: 102 MMHG | TEMPERATURE: 97.9 F | HEART RATE: 80 BPM | BODY MASS INDEX: 31.19 KG/M2 | DIASTOLIC BLOOD PRESSURE: 68 MMHG

## 2018-01-24 DIAGNOSIS — Z34.82 ENCOUNTER FOR SUPERVISION OF OTHER NORMAL PREGNANCY IN SECOND TRIMESTER: Primary | ICD-10-CM

## 2018-01-24 PROBLEM — Z34.92 ENCOUNTER FOR SUPERVISION OF NORMAL PREGNANCY IN SECOND TRIMESTER: Status: ACTIVE | Noted: 2017-09-22

## 2018-01-24 NOTE — PROGRESS NOTES
SUBJECTIVE  No vaginal bleeding, loss of fluids, or cramping noted. No headaches, change in vision, RUQ pain, or extremity edema. Patient continues to feel baby move. She has been able to tolerate a diet with no significant nausea/vomitting. She has been taking her pre-gianni vitamin.    OBJECTIVE  /68  Pulse 80  Temp 97.9  F (36.6  C) (Oral)  Wt 167 lb 12.8 oz (76.1 kg)  LMP 2017  SpO2 97%  BMI 31.19 kg/m2    General: sitting up in a chair awake and alert. Pleasant and cooperative in NAD.  HEENT: EOMI, mucus membranes moist  Cardio: RRR  Resp: clear vesicular breath sounds bilaterally, no increased work of breathing  Abd: gravid, non-tender  Psych: mood good, affect congruent. Mentation and conversation appropriate.    ASSESSMENT/PLAN  Patient is a  a 22 year old  female at 25w6d corresponding to DARLENE of  May 3, 2018 obtained via first trimester ultrasound. Expecting baby boy.  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 Initial difficulty with N/V and weight gain. Enrolled in [facility] and they are also helping her with foods. No longer taking unisom or pyridoxine.  - reflux: TUMS prescribed ; alleviates symptoms  - flu vaccine   - planning on vaginal delivery  - breast feeding  - discussed contraception; will think about it and further discuss at next visit    Follow up in 2 weeks for 1 hour GTT, RPR, Hgb    Precepted with: MD Parvin Parrish MD (PGY2)  Pager: [phone]  [facility] Resident

## 2018-01-24 NOTE — MR AVS SNAPSHOT
After Visit Summary   2018    Rashmi Oliva    MRN: [id number]           Patient Information     Date Of Birth          1995        Visit Information        Provider Department      2018 2:40 PM Parvin Silva MD [facility]        Today's Diagnoses     Encounter for supervision of other normal pregnancy in second trimester    -  1       Follow-ups after your visit        Who to contact     Please call your clinic at [phone] to:    Ask questions about your health    Make or cancel appointments    Discuss your medicines    Learn about your test results    Speak to your doctor   If you have compliments or concerns about an experience at your clinic, or if you wish to file a complaint, please contact [facility] Patient Relations at [phone] or email us at [email or web address]         Additional Information About Your Visit        MyChart Information     [employer] is an electronic gateway that provides easy, online access to your medical records. With First [employer], you can request a clinic appointment, read your test results, renew a prescription or communicate with your care team.     To sign up for [employer] visit the website at [email or web address]   You will be asked to enter the access code listed below, as well as some personal information. Please follow the directions to create your username and password.     Your access code is: [email or web address]  Expires: 3/5/2018  2:59 PM     Your access code will  in 90 days. If you need help or a new code, please contact your [facility] or call [phone] for assistance.        Care EveryWhere ID     This is your Care EveryWhere ID. This could be used by other organizations to access your [facility] medical records  [id number]        Your Vitals Were     Pulse Temperature Last Period Pulse Oximetry BMI (Body Mass Index)       80 97.9  F (36.6  C) (Oral)  07/18/2017 97% 31.19 kg/m2        Blood Pressure from Last 3 Encounters:   01/24/18 102/68   12/28/17 119/74   12/05/17 106/66    Weight from Last 3 Encounters:   01/24/18 167 lb 12.8 oz (76.1 kg)   12/28/17 163 lb 12.8 oz (74.3 kg)   12/05/17 161 lb 6.4 oz (73.2 kg)              Today, you had the following     No orders found for display       Primary Care Provider Office Phone # Fax #    Caridad House,  [phone]       [facility] [address] [place] [address] [facility] [address]        Equal Access to Services     ROBERT CASTORENA : Hadii aad ku hadasho Soomaali, waaxda luqadaha, qaybta kaalmada adeegyada, yesenia sainz. So [facility] [phone].    ATENCIÓN: Si habla español, tiene a busch disposición servicios gratuitos de asistencia lingüística. Llame al [phone].    We comply with applicable federal civil rights laws and [place] laws. We do not discriminate on the basis of race, color, national origin, age, disability, sex, sexual orientation, or gender identity.            Thank you!     Thank you for choosing [facility]  for your care. Our goal is always to provide you with excellent care. Hearing back from our patients is one way we can continue to improve our services. Please take a few minutes to complete the written survey that you may receive in the mail after your visit with us. Thank you!             Your Updated Medication List - Protect others around you: Learn how to safely use, store and throw away your medicines at www.disposemymeds.org.          This list is accurate as of 1/24/18  3:15 PM.  Always use your most recent med list.                   Brand Name Dispense Instructions for use Diagnosis    calcium carbonate 500 MG chewable tablet    TUMS    150 tablet    Take 1 tablet (500 mg) by mouth daily    Encounter for supervision of other normal pregnancy in second trimester       Prenatal Vitamins 28-0.8 MG Tabs     90 tablet    Take 1  tablet by mouth daily    Encounter for supervision of other normal pregnancy in first trimester

## 2018-02-07 ENCOUNTER — OFFICE VISIT (OUTPATIENT)
Dept: FAMILY MEDICINE | Facility: CLINIC | Age: 23
End: 2018-02-07
Payer: COMMERCIAL

## 2018-02-07 VITALS
TEMPERATURE: 97.8 F | OXYGEN SATURATION: 99 % | RESPIRATION RATE: 20 BRPM | HEIGHT: 62 IN | BODY MASS INDEX: 30.88 KG/M2 | HEART RATE: 72 BPM | WEIGHT: 167.8 LBS | DIASTOLIC BLOOD PRESSURE: 72 MMHG | SYSTOLIC BLOOD PRESSURE: 109 MMHG

## 2018-02-07 DIAGNOSIS — K21.9 GASTROESOPHAGEAL REFLUX DISEASE, ESOPHAGITIS PRESENCE NOT SPECIFIED: ICD-10-CM

## 2018-02-07 DIAGNOSIS — Z34.02 ENCOUNTER FOR SUPERVISION OF NORMAL FIRST PREGNANCY IN SECOND TRIMESTER: Primary | ICD-10-CM

## 2018-02-07 LAB
GLU GEST SCREEN 1HR 50G: 145 (ref 0–129)
HCT VFR BLD AUTO: 38.9 % (ref 35–47)
HEMOGLOBIN: 12 G/DL (ref 11.7–15.7)
MCH RBC QN AUTO: 28 PG (ref 26.5–35)
MCHC RBC AUTO-ENTMCNC: 30.8 G/DL (ref 32–36)
MCV RBC AUTO: 90.8 FL (ref 78–100)
PLATELET # BLD AUTO: 325 K/UL (ref 150–450)
RBC # BLD AUTO: 4.28 M/UL (ref 3.8–5.2)
WBC # BLD AUTO: 13.7 K/UL (ref 4–11)

## 2018-02-07 NOTE — PROGRESS NOTES
Preceptor Attestation:  Patient's case reviewed and discussed with  Patient seen and discussed with the resident.  I agree with written assessment and plan of care.  Supervising Physician:  Marilou Benítez MD  [facility]

## 2018-02-07 NOTE — PROGRESS NOTES
"SUBJECTIVE  No vaginal bleeding, loss of fluids, or cramping noted. No headaches, change in vision, RUQ pain, or extremity edema. Patient continues to feel baby move. She has been able to tolerate a diet with no significant nausea/vomitting. She has been taking her pre-gianni vitamin.    OBJECTIVE  /72  Pulse 72  Temp 97.8  F (36.6  C) (Oral)  Resp 20  Ht 5' 1.5\" (156.2 cm)  Wt 167 lb 12.8 oz (76.1 kg)  LMP 2017  SpO2 99%  BMI 31.19 kg/m2    General: sitting up in a chair awake and alert. Pleasant and cooperative in NAD.  Cardio: RRR  Resp: clear vesicular breath sounds bilaterally, no increased work of breathing  Abd: gravid, non-tender  Skin: no lesions or rashes  Psych: mood good, affect congruent. Mentation and conversation appropriate.    ASSESSMENT/PLAN  Patient is a  a 22 year old  female at 27w6d corresponding to DARLENE of  May 3, 2018 obtained via first trimester ultrasound. Expecting baby boy.  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 Initial difficulty with N/V and weight gain. Enrolled in [employer] and they are also helping her with foods. No longer taking unisom or pyridoxine. Tolerating diet well w/o N/V. Pregravid BMI 30.5 (11-20 TWG goal).  - reflux: TUMS prescribed . Continues to have symptoms. Ranitidine prescribed .  - flu vaccine   - Tdap vaccine   - 1 hour GTT today ()  - 3rd trimester RPR + Hgb today ()  - planning on vaginal delivery  - breast feeding  - contraception: considering OCPs    Precepted with: Dr. Marilou Silva MD (PGY2)  Pager: [phone]  [facility] Resident    "

## 2018-02-07 NOTE — MR AVS SNAPSHOT
After Visit Summary   2/7/2018    Rashmi Oliva    MRN: [id number]           Patient Information     Date Of Birth          1995        Visit Information        Provider Department      2/7/2018 3:20 PM Parvin Silva MD [facility]        Today's Diagnoses     Encounter for supervision of normal first pregnancy in second trimester    -  1    Gastroesophageal reflux disease, esophagitis presence not specified           Follow-ups after your visit        Your next 10 appointments already scheduled     Feb 13, 2018  1:30 PM CST   LAB VISIT with [facility]   [facility] ([facility])    [address] [place] [address]   [phone]           If you are coming in for fasting labs, you will need to fast for 10-12 hours prior to your appt. Fasting labs include lipids, cholesterol, glucose, complete metabolic panel, basic metabolic panel, and triglycerides. Do not drink coffee or any other fluids. Water with medications are okay. Do not chew gum as well. If you have any further questions, please contact your health care team.                Feb 21, 2018  3:40 PM CST   RETURN OB with Parvin Silva MD   [facility] ([facility])    [address] [place] [address]   [phone]              Who to contact     Please call your clinic at [phone] to:    Ask questions about your health    Make or cancel appointments    Discuss your medicines    Learn about your test results    Speak to your doctor            Additional Information About Your Visit        MyChart Information     Ribbitt is an electronic gateway that provides easy, online access to your medical records. With Owlparrot, you can request a clinic appointment, read your test results, renew a prescription or communicate with your care team.     To sign up for Ribbitt visit the website at [email or web address]   You will be asked to enter the access code listed  "below, as well as some personal information. Please follow the directions to create your username and password.     Your access code is: [email or web address]  Expires: 3/5/2018  2:59 PM     Your access code will  in 90 days. If you need help or a new code, please contact your [facility] or call [phone] for assistance.        Care EveryWhere ID     This is your Care EveryWhere ID. This could be used by other organizations to access your [place] medical records  [id number]        Your Vitals Were     Pulse Temperature Respirations Height Last Period Pulse Oximetry    72 97.8  F (36.6  C) (Oral) 20 5' 1.5\" (156.2 cm) 2017 99%    BMI (Body Mass Index)                   31.19 kg/m2            Blood Pressure from Last 3 Encounters:   18 109/72   18 102/68   17 119/74    Weight from Last 3 Encounters:   18 167 lb 12.8 oz (76.1 kg)   18 167 lb 12.8 oz (76.1 kg)   17 163 lb 12.8 oz (74.3 kg)              We Performed the Following     CBC with Plt (P FM)     Glucose Challenge  1 Hr  (P FM)     Syphilis Screen [place] (RPR/VDRL) ([facility])          Today's Medication Changes          These changes are accurate as of 18 11:59 PM.  If you have any questions, ask your nurse or doctor.               Start taking these medicines.        Dose/Directions    ranitidine 150 MG tablet   Commonly known as:  ZANTAC   Used for:  Gastroesophageal reflux disease, esophagitis presence not specified   Started by:  Parvin Silva MD        Dose:  150 mg   Take 1 tablet (150 mg) by mouth 2 times daily   Quantity:  60 tablet   Refills:  3            Where to get your medicines      [id number] - [place], [place] - [address] AT [facility] [address], [place] [address]     Phone:  [phone]     ranitidine 150 MG tablet                Primary Care Provider " Office Phone # Fax #    Caridad House [phone]       [facility] [address] [facility] [place] [address]        Equal Access to Services     ROBERT CASTORENA : Hadii ramiro ku hadasho Soomaali, waaxda luqadaha, qaybta kaalmada adeegyada, waxdominique grimesn nini ren laAmritanusrat sainz. So [facility] [phone].    ATENCIÓN: Si habla español, tiene a busch disposición servicios gratuitos de asistencia lingüística. Llame al [phone].    We comply with applicable federal civil rights laws and [place] laws. We do not discriminate on the basis of race, color, national origin, age, disability, sex, sexual orientation, or gender identity.            Thank you!     Thank you for choosing [facility]  for your care. Our goal is always to provide you with excellent care. Hearing back from our patients is one way we can continue to improve our services. Please take a few minutes to complete the written survey that you may receive in the mail after your visit with us. Thank you!             Your Updated Medication List - Protect others around you: Learn how to safely use, store and throw away your medicines at www.disposemymeds.org.          This list is accurate as of 2/7/18 11:59 PM.  Always use your most recent med list.                   Brand Name Dispense Instructions for use Diagnosis    calcium carbonate 500 MG chewable tablet    TUMS    150 tablet    Take 1 tablet (500 mg) by mouth daily    Encounter for supervision of other normal pregnancy in second trimester       Prenatal Vitamins 28-0.8 MG Tabs     90 tablet    Take 1 tablet by mouth daily    Encounter for supervision of other normal pregnancy in first trimester       ranitidine 150 MG tablet    ZANTAC    60 tablet    Take 1 tablet (150 mg) by mouth 2 times daily    Gastroesophageal reflux disease, esophagitis presence not specified

## 2018-02-08 ENCOUNTER — TELEPHONE (OUTPATIENT)
Dept: FAMILY MEDICINE | Facility: CLINIC | Age: 23
End: 2018-02-08

## 2018-02-08 LAB — RPR SER QL: NORMAL

## 2018-02-08 NOTE — TELEPHONE ENCOUNTER
Called, left message for Rashmi to call me back. When she calls, please schedule 3 hour gtt lab appointment for her as she did not pass 1 hr screen. This may have already been discussed or informed with patient. If she should have further questions, I can certainly speak with her. Thank you. Ramiro BLACKMAN

## 2018-02-13 DIAGNOSIS — Z34.93 ENCNTR FOR SUPRVSN OF NORMAL PREG, UNSP, THIRD TRIMESTER: Primary | ICD-10-CM

## 2018-02-13 DIAGNOSIS — Z34.82 ENCOUNTER FOR SUPERVISION OF OTHER NORMAL PREGNANCY IN SECOND TRIMESTER: ICD-10-CM

## 2018-02-13 LAB
GLU, 1 HOUR, 100 G: 169 MG/DL
GLU, 2 HOUR, 100 G: 165 MG/DL
GLU, 3 HOUR, 100 G: 136 MG/DL
GLUCOSE P FAST SERPL-MCNC: 72 MG/DL

## 2018-02-13 RX ORDER — CALCIUM CARBONATE 500 MG/1
1 TABLET, CHEWABLE ORAL 2 TIMES DAILY PRN
Qty: 150 TABLET | Refills: 0 | Status: SHIPPED | OUTPATIENT
Start: 2018-02-13 | End: 2018-08-08

## 2018-02-13 NOTE — TELEPHONE ENCOUNTER
[facility] phone call message- medication clarification/question:    Full Medication Name: calcium carbonate (TUMS) 500 MG chewable tablet    Question: patient ask for refills.     Pharmacy confirmed as [id number] - [place], [place] - [address] [facility]: Yes    OK to leave a message on voice mail? Yes    Primary language: English      needed? No    Call taken on February 13, 2018 at 1:00 PM by La Nena Owens

## 2018-02-21 ENCOUNTER — OFFICE VISIT (OUTPATIENT)
Dept: FAMILY MEDICINE | Facility: CLINIC | Age: 23
End: 2018-02-21
Payer: COMMERCIAL

## 2018-02-21 VITALS
WEIGHT: 168.8 LBS | DIASTOLIC BLOOD PRESSURE: 67 MMHG | OXYGEN SATURATION: 97 % | BODY MASS INDEX: 31.38 KG/M2 | HEART RATE: 87 BPM | TEMPERATURE: 98 F | SYSTOLIC BLOOD PRESSURE: 104 MMHG

## 2018-02-21 DIAGNOSIS — Z34.83 ENCOUNTER FOR SUPERVISION OF OTHER NORMAL PREGNANCY IN THIRD TRIMESTER: Primary | ICD-10-CM

## 2018-02-21 NOTE — PROGRESS NOTES
SUBJECTIVE  No vaginal bleeding, loss of fluids, or cramping noted. No headaches, change in vision, RUQ pain, or extremity edema. Patient continues to feel baby move. She has been able to tolerate a diet with no significant nausea/vomitting. She has been taking her pre-gianni vitamin.    OBJECTIVE  /67  Pulse 87  Temp 98  F (36.7  C) (Oral)  Wt 168 lb 12.8 oz (76.6 kg)  LMP 2017  SpO2 97%  BMI 31.38 kg/m2    General: sitting up in a chair awake and alert. Pleasant and cooperative in NAD.  HEENT: EOMI, mucus membranes moist  Cardio: RRR  Resp: clear vesicular breath sounds bilaterally, no increased work of breathing  Abd: gravid, non-tender  MSK: moving all extremities w/o difficulty  Skin: no lesions or rashes  Psych: mood good, affect congruent. Mentation and conversation appropriate.    ASSESSMENT/PLAN  Patient is a  a 22 year old  female at 29w6d corresponding to DARLENE of  May 3, 2018 obtained via first trimester ultrasound. Expecting baby boy.  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 Initial difficulty with N/V and weight gain. Enrolled in [employer] and they are also helping her with foods. No longer taking unisom or pyridoxine. Tolerating diet well w/o N/V. Pregravid BMI 30.5 (11-20 TWG goal).  - reflux: TUMS prescribed . Continues to have symptoms. Ranitidine prescribed . Much improved.  - flu vaccine   - Tdap vaccine   - Failed 1 hour GTT, passed 3 hour GTT (3/4)  - 3rd trimester RPR negative Hgb 12.0  - planning on vaginal delivery  - breast feeding  - contraception: considering OCPs    Precepted with: Dr. Jeyson Silva MD (PGY2)  Pager: [phone]  [facility] Resident

## 2018-02-21 NOTE — MR AVS SNAPSHOT
After Visit Summary   2018    Rashmi Oliva    MRN: [id number]           Patient Information     Date Of Birth          1995        Visit Information        Provider Department      2018 3:40 PM Parvin Silva MD [facility]        Today's Diagnoses     Encounter for supervision of other normal pregnancy in third trimester    -  1       Follow-ups after your visit        Your next 10 appointments already scheduled     Mar 15, 2018  3:40 PM CDT   RETURN OB with Parvin Silva MD   [facility] ([facility])    [address] [place] [address]   [phone]              Who to contact     Please call your clinic at [phone] to:    Ask questions about your health    Make or cancel appointments    Discuss your medicines    Learn about your test results    Speak to your doctor            Additional Information About Your Visit        MyChart Information     [employer] is an electronic gateway that provides easy, online access to your medical records. With [employer], you can request a clinic appointment, read your test results, renew a prescription or communicate with your care team.     To sign up for [employer] visit the website at [email or web address]   You will be asked to enter the access code listed below, as well as some personal information. Please follow the directions to create your username and password.     Your access code is: [email or web address]  Expires: 3/5/2018  2:59 PM     Your access code will  in 90 days. If you need help or a new code, please contact your [facility] or call [phone] for assistance.        Care EveryWhere ID     This is your Care EveryWhere ID. This could be used by other organizations to access your [place] medical records  [id number]        Your Vitals Were     Pulse Temperature Last Period Pulse Oximetry BMI (Body Mass Index)       87 98  F (36.7  C) (Oral) 2017  97% 31.38 kg/m2        Blood Pressure from Last 3 Encounters:   02/21/18 104/67   02/07/18 109/72   01/24/18 102/68    Weight from Last 3 Encounters:   02/21/18 168 lb 12.8 oz (76.6 kg)   02/07/18 167 lb 12.8 oz (76.1 kg)   01/24/18 167 lb 12.8 oz (76.1 kg)              Today, you had the following     No orders found for display       Primary Care Provider Office Phone # Fax #    Caridad House [phone]       [facility] [address] [place] [address]        Equal Access to Services     LUIS FERNANDO CASTORENA : Hadii ramiro daniel Soeverardo, waaxda luqadaha, qaybta kaalmada adechristie, yesenia sainz. So [facility] [phone].    ATENCIÓN: Si habla español, tiene a busch disposición servicios gratuitos de asistencia lingüística. Llame al [phone].    We comply with applicable federal civil rights laws and [place] laws. We do not discriminate on the basis of race, color, national origin, age, disability, sex, sexual orientation, or gender identity.            Thank you!     Thank you for choosing [facility]  for your care. Our goal is always to provide you with excellent care. Hearing back from our patients is one way we can continue to improve our services. Please take a few minutes to complete the written survey that you may receive in the mail after your visit with us. Thank you!             Your Updated Medication List - Protect others around you: Learn how to safely use, store and throw away your medicines at www.disposemymeds.org.          This list is accurate as of 2/21/18 11:59 PM.  Always use your most recent med list.                   Brand Name Dispense Instructions for use Diagnosis    calcium carbonate 500 MG chewable tablet    TUMS    150 tablet    Take 1 tablet (500 mg) by mouth 2 times daily as needed for heartburn    Encounter for supervision of other normal pregnancy in second trimester       Prenatal Vitamins 28-0.8 MG Tabs      90 tablet    Take 1 tablet by mouth daily    Encounter for supervision of other normal pregnancy in first trimester       ranitidine 150 MG tablet    ZANTAC    60 tablet    Take 1 tablet (150 mg) by mouth 2 times daily    Gastroesophageal reflux disease, esophagitis presence not specified

## 2018-02-25 PROBLEM — Z34.93 ENCOUNTER FOR SUPERVISION OF NORMAL PREGNANCY IN THIRD TRIMESTER: Status: ACTIVE | Noted: 2017-09-22

## 2018-03-15 ENCOUNTER — OFFICE VISIT (OUTPATIENT)
Dept: FAMILY MEDICINE | Facility: CLINIC | Age: 23
End: 2018-03-15
Payer: COMMERCIAL

## 2018-03-15 VITALS
SYSTOLIC BLOOD PRESSURE: 109 MMHG | BODY MASS INDEX: 32.17 KG/M2 | TEMPERATURE: 97.3 F | HEART RATE: 86 BPM | DIASTOLIC BLOOD PRESSURE: 74 MMHG | WEIGHT: 174.8 LBS | OXYGEN SATURATION: 97 % | HEIGHT: 62 IN

## 2018-03-15 DIAGNOSIS — Z34.03 ENCOUNTER FOR SUPERVISION OF NORMAL FIRST PREGNANCY IN THIRD TRIMESTER: Primary | ICD-10-CM

## 2018-03-15 NOTE — MR AVS SNAPSHOT
"              After Visit Summary   3/15/2018    Rashmi Oliva    MRN: [id number]           Patient Information     Date Of Birth          1995        Visit Information        Provider Department      3/15/2018 3:40 PM Parvin Silva MD [facility]        Today's Diagnoses     Encounter for supervision of normal first pregnancy in third trimester    -  1       Follow-ups after your visit        Who to contact     Please call your clinic at [phone] to:    Ask questions about your health    Make or cancel appointments    Discuss your medicines    Learn about your test results    Speak to your doctor            Additional Information About Your Visit        MyChart Information     [employer] is an electronic gateway that provides easy, online access to your medical records. With [employer], you can request a clinic appointment, read your test results, renew a prescription or communicate with your care team.     To sign up for Que[employer]ixt visit the website at [email or web address]   You will be asked to enter the access code listed below, as well as some personal information. Please follow the directions to create your username and password.     Your access code is: [email or web address]  Expires: 2018  3:39 PM     Your access code will  in 90 days. If you need help or a new code, please contact your [facility] or call [phone] for assistance.        Care EveryWhere ID     This is your Care EveryWhere ID. This could be used by other organizations to access your [place] medical records  [id number]        Your Vitals Were     Pulse Temperature Height Last Period Pulse Oximetry BMI (Body Mass Index)    86 97.3  F (36.3  C) (Oral) 5' 1.6\" (156.5 cm) 2017 97% 32.39 kg/m2       Blood Pressure from Last 3 Encounters:   03/15/18 109/74   18 104/67   18 109/72    Weight from Last 3 Encounters:   03/15/18 174 lb 12.8 oz (79.3 kg)   18 168 lb 12.8 " oz (76.6 kg)   02/07/18 167 lb 12.8 oz (76.1 kg)              Today, you had the following     No orders found for display       Primary Care Provider Office Phone # Fax #    Caridad House [phone]       [facility] [address] [place] [address]        Equal Access to Services     [facility]: Hadii aad ku hadasho Soomaali, waaxda luqadaha, qaybta kaalmada adeegyada, waxay idiin hayaan adeeg kharash la'aan . So [facility] [phone].    ATENCIÓN: Si habla español, tiene a busch disposición servicios gratuitos de asistencia lingüística. Llame al [phone].    We comply with applicable federal civil rights laws and [place] laws. We do not discriminate on the basis of race, color, national origin, age, disability, sex, sexual orientation, or gender identity.            Thank you!     Thank you for choosing [facility]  for your care. Our goal is always to provide you with excellent care. Hearing back from our patients is one way we can continue to improve our services. Please take a few minutes to complete the written survey that you may receive in the mail after your visit with us. Thank you!             Your Updated Medication List - Protect others around you: Learn how to safely use, store and throw away your medicines at www.disposemymeds.org.          This list is accurate as of 3/15/18  4:32 PM.  Always use your most recent med list.                   Brand Name Dispense Instructions for use Diagnosis    calcium carbonate 500 MG chewable tablet    TUMS    150 tablet    Take 1 tablet (500 mg) by mouth 2 times daily as needed for heartburn    Encounter for supervision of other normal pregnancy in second trimester       Prenatal Vitamins 28-0.8 MG Tabs     90 tablet    Take 1 tablet by mouth daily    Encounter for supervision of other normal pregnancy in first trimester       ranitidine 150 MG tablet    ZANTAC    60 tablet    Take 1 tablet (150 mg) by mouth 2  times daily    Gastroesophageal reflux disease, esophagitis presence not specified

## 2018-03-15 NOTE — PROGRESS NOTES
"SUBJECTIVE  No vaginal bleeding, loss of fluids, or cramping noted. No headaches, change in vision, RUQ pain, or extremity edema. Patient continues to feel baby move. She has been able to tolerate a diet with no significant nausea/vomitting. She has been taking her pre-gianni vitamin.    OBJECTIVE  /74  Pulse 86  Temp 97.3  F (36.3  C) (Oral)  Ht 5' 1.6\" (156.5 cm)  Wt 174 lb 12.8 oz (79.3 kg)  LMP 2017  SpO2 97%  BMI 32.39 kg/m2    General: sitting up in a chair awake and alert. Pleasant and cooperative in NAD.  Cardio: RRR  Resp: clear vesicular breath sounds bilaterally, no increased work of breathing  Abd: gravid, non-tender  Skin: no lesions or rashes  Psych: mood good, affect congruent. Mentation and conversation appropriate.    ASSESSMENT/PLAN  Patient is a  a 23 year old  female at 33w0d corresponding to DARLENE of  May 3, 2018 obtained via first trimester ultrasound. Expecting baby boy.  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 Initial difficulty with N/V and weight gain. Enrolled in [employer] and they are also helping her with foods. No longer taking unisom or pyridoxine. Tolerating diet well w/o N/V. Pregravid BMI 30.5 (11-20 TWG goal).  - reflux: TUMS prescribed . Continues to have symptoms. Ranitidine prescribed . Much improved.  - flu vaccine   - Tdap vaccine   - Failed 1 hour GTT, passed 3 hour GTT (3/4)  - 3rd trimester RPR negative  - hgb 12.0  - planning on vaginal delivery  - breast feeding  - contraception: considering OCPs    Precepted with: MD Parvin Parrish MD (PGY2)  Pager: [phone]  [facility] Resident    "

## 2018-03-15 NOTE — LETTER
[employer] Customer Service  [facility]  [address], [address]  [place] [address]  Fax: [phone]  Phone: [phone]      March 15, 2018      Rashmi Oliva  [address] [place] [address]        Dear Rashmi,    Thank you for your interest in becoming a [employer] user!    Your access code is: [email or web address]  Expires: 2018  3:39 PM     Please access the [employer] website at [email or web address].  Below the ID and password fields, select the  Sign Up Now  as New User.  You will be prompted to enter the access code listed above as well as additional personal information.  Please follow the directions carefully when creating your username and password.    If you allow your access code to , or if you have any questions please call a [employer] Representative at [phone] during normal clinic hours.     Sincerely,      [employer] Customer Service  [facility]

## 2018-04-03 ENCOUNTER — OFFICE VISIT (OUTPATIENT)
Dept: FAMILY MEDICINE | Facility: CLINIC | Age: 23
End: 2018-04-03
Payer: COMMERCIAL

## 2018-04-03 VITALS
HEIGHT: 62 IN | WEIGHT: 178 LBS | BODY MASS INDEX: 32.76 KG/M2 | OXYGEN SATURATION: 97 % | DIASTOLIC BLOOD PRESSURE: 65 MMHG | RESPIRATION RATE: 16 BRPM | HEART RATE: 90 BPM | TEMPERATURE: 97.4 F | SYSTOLIC BLOOD PRESSURE: 102 MMHG

## 2018-04-03 DIAGNOSIS — Z34.83 ENCOUNTER FOR SUPERVISION OF OTHER NORMAL PREGNANCY IN THIRD TRIMESTER: Primary | ICD-10-CM

## 2018-04-03 NOTE — MR AVS SNAPSHOT
After Visit Summary   4/3/2018    Rashmi Oliva    MRN: [id number]           Patient Information     Date Of Birth          1995        Visit Information        Provider Department      4/3/2018 8:20 AM Parvin Silva MD [facility]        Today's Diagnoses     Encounter for supervision of other normal pregnancy in third trimester    -  1       Follow-ups after your visit        Your next 10 appointments already scheduled     Apr 16, 2018  3:00 PM CDT   RETURN OB with Parvin Silva MD   [facility] ([facility])    [address] [place] [address]   [phone]            Apr 23, 2018  2:20 PM CDT   RETURN OB with Parvin Silva MD   [facility] ([facility])    [address] [place] [address]   [phone]            May 04, 2018  1:40 PM CDT   RETURN OB with Parvin Silva MD   [facility] ([facility])    [address] [place] [address]   [phone]              Who to contact     Please call your clinic at [phone] to:    Ask questions about your health    Make or cancel appointments    Discuss your medicines    Learn about your test results    Speak to your doctor            Additional Information About Your Visit        [employer] Information     [employer] gives you secure access to your electronic health record. If you see a primary care provider, you can also send messages to your care team and make appointments. If you have questions, please call your primary care clinic.  If you do not have a primary care provider, please call [phone] and they will assist you.      [employer] is an electronic gateway that provides easy, online access to your medical records. With [employer] you can request a clinic appointment, read your test results, renew a prescription or communicate with your care team.     To access your existing account, please contact your [facility]  [facility] or call [phone] for assistance.        Care EveryWhere ID     This is your Care EveryWhere ID. This could be used by other organizations to access your [place] medical records  [id number]        Your Vitals Were     Pulse Temperature Respirations Height Last Period Pulse Oximetry    90 97.4  F (36.3  C) (Oral) 16 5' 2.21\" (158 cm) 07/18/2017 97%    BMI (Body Mass Index)                   32.34 kg/m2            Blood Pressure from Last 3 Encounters:   04/09/18 120/74   04/03/18 102/65   03/15/18 109/74    Weight from Last 3 Encounters:   04/09/18 179 lb 3.2 oz (81.3 kg)   04/03/18 178 lb (80.7 kg)   03/15/18 174 lb 12.8 oz (79.3 kg)              We Performed the Following     Clt. Grp B Strp Screen ([facility])        Primary Care Provider Office Phone # Fax #    Caridad Munoz DO [facility] [phone]       [facility] [address] [place] [address] [address] [place] [address]        Equal Access to Services     [facility]: Hadii aad ku hadasho Soomaali, waaxda luqadaha, qaybta kaalmada adeegyada, yesenia tabares . So [facility] [phone].    ATENCIÓN: Si habla español, tiene a busch disposición servicios gratuitos de asistencia lingüística. [facility] [phone].    We comply with applicable federal civil rights laws and [place] laws. We do not discriminate on the basis of race, color, national origin, age, disability, sex, sexual orientation, or gender identity.            Thank you!     Thank you for choosing [facility]  for your care. Our goal is always to provide you with excellent care. Hearing back from our patients is one way we can continue to improve our services. Please take a few minutes to complete the written survey that you may receive in the mail after your visit with us. Thank you!             Your Updated Medication List - Protect others around you: Learn how to safely use, store and throw away your medicines at www.disposemymeds.org.    "       This list is accurate as of 4/3/18 11:59 PM.  Always use your most recent med list.                   Brand Name Dispense Instructions for use Diagnosis    calcium carbonate 500 MG chewable tablet    TUMS    150 tablet    Take 1 tablet (500 mg) by mouth 2 times daily as needed for heartburn    Encounter for supervision of other normal pregnancy in second trimester       Prenatal Vitamins 28-0.8 MG Tabs     90 tablet    Take 1 tablet by mouth daily    Encounter for supervision of other normal pregnancy in first trimester       ranitidine 150 MG tablet    ZANTAC    60 tablet    Take 1 tablet (150 mg) by mouth 2 times daily    Gastroesophageal reflux disease, esophagitis presence not specified

## 2018-04-03 NOTE — PROGRESS NOTES
"SUBJECTIVE  No vaginal bleeding, loss of fluids, or cramping noted. No headaches, change in vision, RUQ pain, or extremity edema. Patient continues to feel baby move. She has been able to tolerate a diet with no significant nausea/vomitting. She has been taking her pre-gianni vitamin.    OBJECTIVE  /65 (BP Location: Right arm, Patient Position: Chair, Cuff Size: Adult Regular)  Pulse 90  Temp 97.4  F (36.3  C) (Oral)  Resp 16  Ht 5' 2.21\" (158 cm)  Wt 178 lb (80.7 kg)  LMP 2017  SpO2 97%  BMI 32.34 kg/m2    General: sitting up in a chair awake and alert. Pleasant and cooperative in NAD.  Cardio: RRR  Resp: clear vesicular breath sounds bilaterally, no increased work of breathing  Abd: gravid, non-tender  Skin: no lesions or rashes  Psych: mood good, affect congruent. Mentation and conversation appropriate.    ASSESSMENT/PLAN  Patient is a  a 23 year old  female at 35w5d corresponding to DARLENE of  May 3, 2018 obtained via first trimester ultrasound. Expecting baby boy.  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 Initial difficulty with N/V and weight gain. Enrolled in [employer] and they are also helping her with foods. No longer taking unisom or pyridoxine. Tolerating diet well w/o N/V. Pregravid BMI 30.5 (11-20 TWG goal).  - reflux: TUMS prescribed . Continues to have symptoms. Ranitidine prescribed . Much improved.  - flu vaccine   - Tdap vaccine   - Failed 1 hour GTT, passed 3 hour GTT (3/4)  - 3rd trimester RPR negative  - hgb 12.0  - GBS collected today 4/3  - planning on vaginal delivery  - breast feeding  - contraception: considering OCPs    Precepted with: MD Parvin Mackenzie MD (PGY2)  Pager: [phone]  [facility] Resident    "

## 2018-04-04 LAB
ALLERGIC TO PENICILLIN: NO
GP B STREP AG SPEC QL LA: NEGATIVE

## 2018-04-09 ENCOUNTER — OFFICE VISIT (OUTPATIENT)
Dept: FAMILY MEDICINE | Facility: CLINIC | Age: 23
End: 2018-04-09
Payer: COMMERCIAL

## 2018-04-09 VITALS
DIASTOLIC BLOOD PRESSURE: 74 MMHG | OXYGEN SATURATION: 94 % | SYSTOLIC BLOOD PRESSURE: 120 MMHG | BODY MASS INDEX: 32.56 KG/M2 | WEIGHT: 179.2 LBS | TEMPERATURE: 98 F | HEART RATE: 94 BPM

## 2018-04-09 DIAGNOSIS — Z34.83 ENCOUNTER FOR SUPERVISION OF OTHER NORMAL PREGNANCY IN THIRD TRIMESTER: Primary | ICD-10-CM

## 2018-04-09 NOTE — MR AVS SNAPSHOT
After Visit Summary   4/9/2018    Rashmi Oliva    MRN: [id number]           Patient Information     Date Of Birth          1995        Visit Information        Provider Department      4/9/2018 1:40 PM Parvin Silva MD [facility]        Today's Diagnoses     Encounter for supervision of other normal pregnancy in third trimester    -  1       Follow-ups after your visit        Your next 10 appointments already scheduled     Apr 16, 2018  3:00 PM CDT   RETURN OB with Parvin Silva MD   [facility] ([facility])    [address] [place] [address]   [phone]            Apr 23, 2018  2:20 PM CDT   RETURN OB with Parvin Silva MD   [facility] ([facility])    [address] [place] [address]   [phone]            May 04, 2018  1:40 PM CDT   RETURN OB with Parvin Silva MD   [facility] ([facility])    [address] [place] [address]   [phone]              Who to contact     Please call your clinic at [phone] to:    Ask questions about your health    Make or cancel appointments    Discuss your medicines    Learn about your test results    Speak to your doctor            Additional Information About Your Visit        [employer] Information     [employer] gives you secure access to your electronic health record. If you see a primary care provider, you can also send messages to your care team and make appointments. If you have questions, please call your primary care clinic.  If you do not have a primary care provider, please call [phone] and they will assist you.      [employer] is an electronic gateway that provides easy, online access to your medical records. With [employer], you can request a clinic appointment, read your test results, renew a prescription or communicate with your care team.     To access your existing account, please contact your [facility] or call [phone] for assistance.        Care EveryWhere ID     This is your Care EveryWhere ID. This could be used by other organizations to access your [place] medical records  [id number]        Your Vitals Were     Pulse Temperature Last Period Pulse Oximetry BMI (Body Mass Index)       94 98  F (36.7  C) (Oral) 07/18/2017 94% 32.56 kg/m2        Blood Pressure from Last 3 Encounters:   04/09/18 120/74   04/03/18 102/65   03/15/18 109/74    Weight from Last 3 Encounters:   04/09/18 179 lb 3.2 oz (81.3 kg)   04/03/18 178 lb (80.7 kg)   03/15/18 174 lb 12.8 oz (79.3 kg)              Today, you had the following     No orders found for display       Primary Care Provider Office Phone # Fax #    Caridad HouseDO [phone]       [address] [place] [address]        Equal Access to Services     LUIS FERNANDO CASTORENA AH: Hadii aad ku hadasho Soomaali, waaxda luqadaha, qaybta kaalmada adeegyada, waxay idiin hayericn nini tabares . So [facility] [phone].    ATENCIÓN: Si habla español, tiene a busch disposición servicios gratuitos de asistencia lingüística. Llame al [phone].    We comply with applicable federal civil rights laws and [place] laws. We do not discriminate on the basis of race, color, national origin, age, disability, sex, sexual orientation, or gender identity.            Thank you!     Thank you for choosing [facility]  for your care. Our goal is always to provide you with excellent care. Hearing back from our patients is one way we can continue to improve our services. Please take a few minutes to complete the written survey that you may receive in the mail after your visit with us. Thank you!             Your Updated Medication List - Protect others around you: Learn how to safely use, store and throw away your medicines at www.disposemymeds.org.          This list is accurate as of 4/9/18 11:59 PM.  Always use your  most recent med list.                   Brand Name Dispense Instructions for use Diagnosis    calcium carbonate 500 MG chewable tablet    TUMS    150 tablet    Take 1 tablet (500 mg) by mouth 2 times daily as needed for heartburn    Encounter for supervision of other normal pregnancy in second trimester       Prenatal Vitamins 28-0.8 MG Tabs     90 tablet    Take 1 tablet by mouth daily    Encounter for supervision of other normal pregnancy in first trimester       ranitidine 150 MG tablet    ZANTAC    60 tablet    Take 1 tablet (150 mg) by mouth 2 times daily    Gastroesophageal reflux disease, esophagitis presence not specified

## 2018-04-09 NOTE — PROGRESS NOTES
SUBJECTIVE  No vaginal bleeding, loss of fluids, or cramping noted. No headaches, change in vision, RUQ pain, or extremity edema. Patient continues to feel baby move. She has been able to tolerate a diet with no significant nausea/vomitting. She has been taking her pre-gianni vitamin.    OBJECTIVE  /74  Pulse 94  Temp 98  F (36.7  C) (Oral)  Wt 179 lb 3.2 oz (81.3 kg)  LMP 2017  SpO2 94%  BMI 32.56 kg/m2    General: sitting up in a chair awake and alert. Pleasant and cooperative in NAD.  Cardio: RRR  Resp: clear vesicular breath sounds bilaterally, no increased work of breathing  Abd: gravid, non-tender  Skin: no lesions or rashes  Psych: mood good, affect congruent. Mentation and conversation appropriate.    ASSESSMENT/PLAN  Patient is a  a 23 year old  female at 36w4d corresponding to DARLENE of  May 3, 2018 obtained via first trimester ultrasound. Expecting baby boy.  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 Initial difficulty with N/V and weight gain. Enrolled in [employer] and they are also helping her with foods. No longer taking unisom or pyridoxine. Tolerating diet well w/o N/V. Pregravid BMI 30.5 (11-20 TWG goal).  - reflux: TUMS prescribed . Continues to have symptoms. Ranitidine prescribed . Much improved.  - flu vaccine   - Tdap vaccine   - Failed 1 hour GTT, passed 3 hour GTT (3/4)  - 3rd trimester RPR negative  - hgb 12.0  - GBS negative 4/3  - planning on vaginal delivery  - breast feeding  - contraception: considering OCPs    Follow up weekly until delivery.    Precepted with: Dr. Razia Silva MD (PGY2)  Pager: [phone]  [facility] Resident

## 2018-04-09 NOTE — PROGRESS NOTES
Preceptor Attestation:   Patient seen, evaluated and discussed with the resident. I have verified the content of the note, which accurately reflects my assessment of the patient and the plan of care.   Supervising Physician:  Teot Pete MD

## 2018-04-10 NOTE — PROGRESS NOTES
Preceptor Attestation:   Patient seen, evaluated and discussed with the resident. I have verified the content of the note, which accurately reflects my assessment of the patient and the plan of care.  Supervising Physician:Rayne Luong MD  [facility]

## 2018-04-16 ENCOUNTER — HOSPITAL ENCOUNTER (OUTPATIENT)
Dept: OBGYN | Facility: HOSPITAL | Age: 23
Discharge: HOME | End: 2018-04-16
Attending: FAMILY MEDICINE | Admitting: FAMILY MEDICINE

## 2018-04-16 ENCOUNTER — OFFICE VISIT (OUTPATIENT)
Dept: FAMILY MEDICINE | Facility: CLINIC | Age: 23
End: 2018-04-16
Payer: COMMERCIAL

## 2018-04-16 VITALS
WEIGHT: 180.2 LBS | TEMPERATURE: 97.6 F | BODY MASS INDEX: 34.02 KG/M2 | DIASTOLIC BLOOD PRESSURE: 72 MMHG | SYSTOLIC BLOOD PRESSURE: 112 MMHG | HEART RATE: 67 BPM | RESPIRATION RATE: 18 BRPM | HEIGHT: 61 IN | OXYGEN SATURATION: 98 %

## 2018-04-16 DIAGNOSIS — Z34.83 ENCOUNTER FOR SUPERVISION OF OTHER NORMAL PREGNANCY IN THIRD TRIMESTER: Primary | ICD-10-CM

## 2018-04-16 LAB — RUPTURE OF FETAL MEMBRANES BY ROM PLUS: NEGATIVE

## 2018-04-16 ASSESSMENT — MIFFLIN-ST. JEOR: SCORE: 1530.6

## 2018-04-16 NOTE — PROGRESS NOTES
SUBJECTIVE  Rashmi is a 23 year old  female at 37w4d corresponding to DARLENE of  May 3, 2018 obtained via first trimester ultrasound.     Yesterday around 12-1pm patient felt a non-radiating pressure in the pelvis with small gush of fluid. Reports gush of fluid has since been intermittent and sometimes incited by walking, relieved by sitting. No fevers, chills, or malodorous discharge or vaginal bleeding. No sexual activity since was tested for STIs. Is GBS negative on 4/3 and blood type O+.     No headaches, change in vision, RUQ pain, or extremity edema. Patient has felt baby move. She has been able to tolerate a diet with no significant nausea/vomitting. She has been taking her pre-gianni vitamin.    OBJECTIVE  LMP 2017    General: sitting up in a chair awake and alert. Pleasant and cooperative in NAD.  Cardio: RRR  Resp: clear vesicular breath sounds bilaterally, no increased work of breathing  Abd: gravid, non-tender  Skin: no lesions or rashes  Psych: mood good, affect congruent. Mentation and conversation appropriate.    ASSESSMENT/PLAN  Patient is a  a 23 year old  female at 37w4d corresponding to DARLENE of  May 3, 2018 obtained via first trimester ultrasound. Expecting baby boy.  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 Initial difficulty with N/V and weight gain. Enrolled in [employer] and they are also helping her with foods. No longer taking unisom or pyridoxine. Tolerating diet well w/o N/V. Pregravid BMI 30.5 (11-20 TWG goal).  - reflux: TUMS prescribed . Continues to have symptoms. Ranitidine prescribed . Much improved.  - flu vaccine   - Tdap vaccine   - Failed 1 hour GTT, passed 3 hour GTT (3/4)  - 3rd trimester RPR negative  - hgb 12.0  - GBS negative 4/3  - planning on vaginal delivery  - breast feeding  -  contraception: considering OCPs    Possible PROM: gushing of fluid incited by walking. Transfer to [facility] for further care.    Precepted with: MD Parvin Cordero MD (PGY2)  Pager: [phone]  [facility] Resident

## 2018-04-16 NOTE — MR AVS SNAPSHOT
After Visit Summary   4/16/2018    Rashmi Oliva    MRN: [id number]           Patient Information     Date Of Birth          1995        Visit Information        Provider Department      4/16/2018 3:00 PM Parvin Silva MD [facility]        Today's Diagnoses     Encounter for supervision of other normal pregnancy in third trimester    -  1       Follow-ups after your visit        Your next 10 appointments already scheduled     Apr 27, 2018  8:20 AM CDT   RETURN OB with Parvin Silva MD   [facility] ([facility])    [address] [place] [address]   [phone]            May 04, 2018  1:40 PM CDT   RETURN OB with Parvin Silva MD   [facility] ([facility])    [address] [place] [address]   [phone]              Who to contact     Please call your clinic at [phone] to:    Ask questions about your health    Make or cancel appointments    Discuss your medicines    Learn about your test results    Speak to your doctor            Additional Information About Your Visit        [employer] Information     [employer] gives you secure access to your electronic health record. If you see a primary care provider, you can also send messages to your care team and make appointments. If you have questions, please call your primary care clinic.  If you do not have a primary care provider, please call [phone] and they will assist you.      [employer] is an electronic gateway that provides easy, online access to your medical records. With [employer], you can request a clinic appointment, read your test results, renew a prescription or communicate with your care team.     To access your existing account, please contact your [facility] or call [phone] for assistance.        Care EveryWhere ID     This is your Care EveryWhere ID. This could be used by other organizations to access your  "[place] medical records  [id number]        Your Vitals Were     Pulse Temperature Respirations Height Last Period Pulse Oximetry    67 97.6  F (36.4  C) (Oral) 18 5' 1.42\" (156 cm) 07/18/2017 98%    BMI (Body Mass Index)                   33.59 kg/m2            Blood Pressure from Last 3 Encounters:   04/20/18 126/83   04/16/18 112/72   04/09/18 120/74    Weight from Last 3 Encounters:   04/20/18 179 lb 3.2 oz (81.3 kg)   04/16/18 180 lb 3.2 oz (81.7 kg)   04/09/18 179 lb 3.2 oz (81.3 kg)              Today, you had the following     No orders found for display       Primary Care Provider Office Phone # Fax #    [facility] [phone]       [address] [place] [address]        Equal Access to Services     ROBERT CASTORENA : Hadii aad ku hadasho Soomaali, waaxda luqadaha, qaybta kaalmada adeegyada, waxay idiin hayaan nini tabares . So [facility] [phone].    ATENCIÓN: Si habla español, tiene a busch disposición servicios gratuitos de asistencia lingüística. Llame al [phone].    We comply with applicable federal civil rights laws and [place] laws. We do not discriminate on the basis of race, color, national origin, age, disability, sex, sexual orientation, or gender identity.            Thank you!     Thank you for choosing [facility]  for your care. Our goal is always to provide you with excellent care. Hearing back from our patients is one way we can continue to improve our services. Please take a few minutes to complete the written survey that you may receive in the mail after your visit with us. Thank you!             Your Updated Medication List - Protect others around you: Learn how to safely use, store and throw away your medicines at www.disposemymeds.org.          This list is accurate as of 4/16/18 11:59 PM.  Always use your most recent med list.                   Brand Name Dispense Instructions for use Diagnosis    calcium " carbonate 500 MG chewable tablet    TUMS    150 tablet    Take 1 tablet (500 mg) by mouth 2 times daily as needed for heartburn    Encounter for supervision of other normal pregnancy in second trimester       Prenatal Vitamins 28-0.8 MG Tabs     90 tablet    Take 1 tablet by mouth daily    Encounter for supervision of other normal pregnancy in first trimester       ranitidine 150 MG tablet    ZANTAC    60 tablet    Take 1 tablet (150 mg) by mouth 2 times daily    Gastroesophageal reflux disease, esophagitis presence not specified

## 2018-04-20 ENCOUNTER — TELEPHONE (OUTPATIENT)
Dept: FAMILY MEDICINE | Facility: CLINIC | Age: 23
End: 2018-04-20

## 2018-04-20 ENCOUNTER — OFFICE VISIT (OUTPATIENT)
Dept: FAMILY MEDICINE | Facility: CLINIC | Age: 23
End: 2018-04-20
Payer: COMMERCIAL

## 2018-04-20 VITALS
SYSTOLIC BLOOD PRESSURE: 126 MMHG | OXYGEN SATURATION: 97 % | TEMPERATURE: 97.9 F | BODY MASS INDEX: 33.4 KG/M2 | WEIGHT: 179.2 LBS | HEART RATE: 82 BPM | DIASTOLIC BLOOD PRESSURE: 83 MMHG

## 2018-04-20 DIAGNOSIS — Z34.83 ENCOUNTER FOR SUPERVISION OF OTHER NORMAL PREGNANCY IN THIRD TRIMESTER: Primary | ICD-10-CM

## 2018-04-20 NOTE — TELEPHONE ENCOUNTER
First pregnancy, not sure if is in active labor. She has been informed by others with her symptoms she may be in labor. C/o nauseousness, increase pressure in vaginal area more noticeable with walking.  While sitting or at rest, can felt fetal movement very low to vaginal area.  Denies increase or noted change in vaginal discharge, bloody show,back pain or contractions. Good fetal movement. Has not eaten breakfast nor lunch. No real morning sickness felt during this pregnancy and she felt it was unusual to be experiencing nausea at this trimester. Reassurance given. Advised to plan on attending her scheduled appt for this afternoon. Will route this to Dr Silva for review. Informed Rashmi if Dr Silva would like her to be seen sooner than 420pm or to present to L/D, I will call her back to inform her. Rashmi states understanding. Ramiro BLACKMAN

## 2018-04-20 NOTE — TELEPHONE ENCOUNTER
[facility] phone call message-patient reporting a symptom:     Symptom: Vomit    Same Day Visit Offered: yes has an appt later with Dr. Silva    Additional comments: Patient calling asking to speak to Dr. Silva due to she is not feeling too good, she states she is feeling like vomiting. Please call and advise.     OK to leave message on voice mail?     Primary language: English      needed? No    Call taken on April 20, 2018 at 8:39 AM by Tony Oliva

## 2018-04-20 NOTE — MR AVS SNAPSHOT
After Visit Summary   4/20/2018    Rashmi Oliva    MRN: [id number]           Patient Information     Date Of Birth          1995        Visit Information        Provider Department      4/20/2018 4:20 PM Parvin Silva MD [facility]        Today's Diagnoses     Encounter for supervision of other normal pregnancy in third trimester    -  1       Follow-ups after your visit        Your next 10 appointments already scheduled     Apr 27, 2018  8:20 AM CDT   RETURN OB with Parvin Silva MD   [facility] ([facility])    [address] [place] [address]   [phone]            May 04, 2018  1:40 PM CDT   RETURN OB with Parvin Silva MD   [facility] ([facility])    [address] [place] [address]   [phone]              Who to contact     Please call your clinic at [phone] to:    Ask questions about your health    Make or cancel appointments    Discuss your medicines    Learn about your test results    Speak to your doctor            Additional Information About Your Visit        [employer] Information     [employer] gives you secure access to your electronic health record. If you see a primary care provider, you can also send messages to your care team and make appointments. If you have questions, please call your primary care clinic.  If you do not have a primary care provider, please call [phone] and they will assist you.      [employer] is an electronic gateway that provides easy, online access to your medical records. With [employer], you can request a clinic appointment, read your test results, renew a prescription or communicate with your care team.     To access your existing account, please contact your [facility] or call [phone] for assistance.        Care EveryWhere ID     This is your Care EveryWhere ID. This could be used by other organizations to access your  [place] medical records  [id number]        Your Vitals Were     Pulse Temperature Last Period Pulse Oximetry BMI (Body Mass Index)       82 97.9  F (36.6  C) (Oral) 07/18/2017 97% 33.4 kg/m2        Blood Pressure from Last 3 Encounters:   04/20/18 126/83   04/16/18 112/72   04/09/18 120/74    Weight from Last 3 Encounters:   04/20/18 179 lb 3.2 oz (81.3 kg)   04/16/18 180 lb 3.2 oz (81.7 kg)   04/09/18 179 lb 3.2 oz (81.3 kg)              Today, you had the following     No orders found for display       Primary Care Provider Office Phone # Fax #    [facility] [phone]       [facility] [address] [place] [address]        Equal Access to Services     [facility]: Hadii ramiro ku hadasho Soomaali, waaxda luqadaha, qaybta kaalmada adeegyada, yesenia gordon haynusrat tabares . So [facility] [phone].    ATENCIÓN: Si habla español, tiene a busch disposición servicios gratuitos de asistencia lingüística. Llame al [phone].    We comply with applicable federal civil rights laws and [place] laws. We do not discriminate on the basis of race, color, national origin, age, disability, sex, sexual orientation, or gender identity.            Thank you!     Thank you for choosing [facility]  for your care. Our goal is always to provide you with excellent care. Hearing back from our patients is one way we can continue to improve our services. Please take a few minutes to complete the written survey that you may receive in the mail after your visit with us. Thank you!             Your Updated Medication List - Protect others around you: Learn how to safely use, store and throw away your medicines at www.disposemymeds.org.          This list is accurate as of 4/20/18 11:59 PM.  Always use your most recent med list.                   Brand Name Dispense Instructions for use Diagnosis    calcium carbonate 500 MG chewable tablet    TUMS    150 tablet    Take 1  tablet (500 mg) by mouth 2 times daily as needed for heartburn    Encounter for supervision of other normal pregnancy in second trimester       Prenatal Vitamins 28-0.8 MG Tabs     90 tablet    Take 1 tablet by mouth daily    Encounter for supervision of other normal pregnancy in first trimester       ranitidine 150 MG tablet    ZANTAC    60 tablet    Take 1 tablet (150 mg) by mouth 2 times daily    Gastroesophageal reflux disease, esophagitis presence not specified

## 2018-04-22 NOTE — PROGRESS NOTES
Preceptor Attestation:  I saw and evaluated the patient on 04/16/18.  I reviewed the resident physician's history, exam, and treatment plan; and I agree with the documentation by the resident physician.  Supervising Physician:  Beto Hu MD

## 2018-04-23 NOTE — PROGRESS NOTES
Preceptor Attestation:   Patient seen, evaluated and discussed with the resident, Dr. Parvin Silva.  I have verified the content of the note, which accurately reflects my assessment of the patient and the plan of care.  Supervising Physician:Rayne Luong MD  [facility]

## 2018-04-27 ENCOUNTER — OFFICE VISIT (OUTPATIENT)
Dept: FAMILY MEDICINE | Facility: CLINIC | Age: 23
End: 2018-04-27
Payer: COMMERCIAL

## 2018-04-27 VITALS
WEIGHT: 181.13 LBS | HEART RATE: 90 BPM | TEMPERATURE: 97.6 F | OXYGEN SATURATION: 97 % | HEIGHT: 62 IN | DIASTOLIC BLOOD PRESSURE: 81 MMHG | BODY MASS INDEX: 33.33 KG/M2 | RESPIRATION RATE: 19 BRPM | SYSTOLIC BLOOD PRESSURE: 120 MMHG

## 2018-04-27 DIAGNOSIS — Z34.83 ENCOUNTER FOR SUPERVISION OF OTHER NORMAL PREGNANCY IN THIRD TRIMESTER: Primary | ICD-10-CM

## 2018-04-27 NOTE — PROGRESS NOTES
"SUBJECTIVE  No vaginal bleeding, loss of fluids, or cramping noted. No headaches, change in vision, RUQ pain, or extremity edema. Patient continues to feel baby move. She has been able to tolerate a diet with no significant nausea/vomitting. She has been taking her pre-gianni vitamin.    Still having some mucus discharge similar to prior visit c/w normal physiological changes prior to labor.  Having some cramping when she ambulates for some time.    OBJECTIVE  /81 (BP Location: Right arm, Patient Position: Sitting, Cuff Size: Adult Large)  Pulse 90  Temp 97.6  F (36.4  C) (Oral)  Resp 19  Ht 5' 2\" (157.5 cm)  Wt 181 lb 2 oz (82.2 kg)  LMP 2017  SpO2 97%  BMI 33.13 kg/m2    General: sitting up in a chair awake and alert. Pleasant and cooperative in NAD.  Cardio: RRR  Resp: clear vesicular breath sounds bilaterally, no increased work of breathing  Abd: gravid, non-tender  Skin: no lesions or rashes  Psych: mood good, affect congruent. Mentation and conversation appropriate.    ASSESSMENT/PLAN  Patient is a  a 23 year old  female at 39w1d corresponding to DARLENE of  May 3, 2018 obtained via first trimester ultrasound. Expecting baby boy.  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 Initial difficulty with N/V and weight gain. Enrolled in [employer] and they are also helping her with foods. No longer taking unisom or pyridoxine. Tolerating diet well w/o N/V. Pregravid BMI 30.5 (11-20 TWG goal).  - reflux: TUMS prescribed . Continues to have symptoms. Ranitidine prescribed . Much improved.  - flu vaccine   - Tdap vaccine   - Failed 1 hour GTT, passed 3 hour GTT (3/4)  - 3rd trimester RPR negative  - hgb 12.0  - GBS negative 4/3  - planning on vaginal delivery  - breast feeding  - contraception: considering " OCPs    Precepted with: MD Parvin Ruby MD (PGY2)  Pager: [phone]  [facility] Resident

## 2018-04-27 NOTE — PROGRESS NOTES
Preceptor Attestation:   Patient seen, evaluated and discussed with the resident. I have verified the content of the note, which accurately reflects my assessment of the patient and the plan of care.  Supervising Physician:Rissa Trejo MD  [facility]

## 2018-04-27 NOTE — MR AVS SNAPSHOT
"              After Visit Summary   4/27/2018    Rashmi Oliva    MRN: [id number]           Patient Information     Date Of Birth          1995        Visit Information        Provider Department      4/27/2018 8:20 AM Parvin Silva MD [facility]        Today's Diagnoses     Encounter for supervision of other normal pregnancy in third trimester    -  1       Follow-ups after your visit        Your next 10 appointments already scheduled     May 04, 2018  1:40 PM CDT   RETURN OB with Parvin Silva MD   [facility] ([facility])    [address] [place] [address]   [phone]              Who to contact     Please call your clinic at [phone] to:    Ask questions about your health    Make or cancel appointments    Discuss your medicines    Learn about your test results    Speak to your doctor            Additional Information About Your Visit        MyChart Information     [employer] gives you secure access to your electronic health record. If you see a primary care provider, you can also send messages to your care team and make appointments. If you have questions, please call your primary care clinic.  If you do not have a primary care provider, please call [phone] and they will assist you.      [employer] is an electronic gateway that provides easy, online access to your medical records. With [employer], you can request a clinic appointment, read your test results, renew a prescription or communicate with your care team.     To access your existing account, please contact your [facility] or call [phone] for assistance.        Care EveryWhere ID     This is your Care EveryWhere ID. This could be used by other organizations to access your [place] medical records  [id number]        Your Vitals Were     Pulse Temperature Respirations Height Last Period Pulse Oximetry    90 97.6  F (36.4  C) (Oral) 19 5' 2\" (157.5 cm) " 07/18/2017 97%    BMI (Body Mass Index)                   33.13 kg/m2            Blood Pressure from Last 3 Encounters:   04/27/18 120/81   04/20/18 126/83   04/16/18 112/72    Weight from Last 3 Encounters:   04/27/18 181 lb 2 oz (82.2 kg)   04/20/18 179 lb 3.2 oz (81.3 kg)   04/16/18 180 lb 3.2 oz (81.7 kg)              Today, you had the following     No orders found for display       Primary Care Provider Office Phone # Fax #    [facility] [phone]       [facility] [address] [place] [address]        Equal Access to Services     ROBERT CASTORENA : Hadii aad ku hadasho Soomaali, waaxda luqadaha, qaybta kaalmada adeegyada, yesenia sainz. So [facility] [phone].    ATENCIÓN: Si habla español, tiene a busch disposición servicios gratuitos de asistencia lingüística. Llame al [phone].    We comply with applicable federal civil rights laws and [place] laws. We do not discriminate on the basis of race, color, national origin, age, disability, sex, sexual orientation, or gender identity.            Thank you!     Thank you for choosing [facility]  for your care. Our goal is always to provide you with excellent care. Hearing back from our patients is one way we can continue to improve our services. Please take a few minutes to complete the written survey that you may receive in the mail after your visit with us. Thank you!             Your Updated Medication List - Protect others around you: Learn how to safely use, store and throw away your medicines at www.disposemymeds.org.          This list is accurate as of 4/27/18  9:08 AM.  Always use your most recent med list.                   Brand Name Dispense Instructions for use Diagnosis    calcium carbonate 500 MG chewable tablet    TUMS    150 tablet    Take 1 tablet (500 mg) by mouth 2 times daily as needed for heartburn    Encounter for supervision of other normal pregnancy in  second trimester       Prenatal Vitamins 28-0.8 MG Tabs     90 tablet    Take 1 tablet by mouth daily    Encounter for supervision of other normal pregnancy in first trimester       ranitidine 150 MG tablet    ZANTAC    60 tablet    Take 1 tablet (150 mg) by mouth 2 times daily    Gastroesophageal reflux disease, esophagitis presence not specified

## 2018-04-28 ENCOUNTER — HOSPITAL ENCOUNTER (OUTPATIENT)
Dept: OBGYN | Facility: HOSPITAL | Age: 23
Discharge: HOME | End: 2018-04-29
Attending: FAMILY MEDICINE | Admitting: FAMILY MEDICINE

## 2018-04-28 LAB — RUPTURE OF FETAL MEMBRANES BY ROM PLUS: NEGATIVE

## 2018-04-28 ASSESSMENT — MIFFLIN-ST. JEOR: SCORE: 1535.14

## 2018-04-29 LAB
CLUE CELLS: ABNORMAL
TRICHOMONAS, WET PREP: ABNORMAL
YEAST, WET PREP: ABNORMAL

## 2018-04-30 ENCOUNTER — COMMUNICATION - HEALTHEAST (OUTPATIENT)
Dept: OBGYN | Facility: HOSPITAL | Age: 23
End: 2018-04-30

## 2018-04-30 LAB
C TRACH DNA SPEC QL PROBE+SIG AMP: NEGATIVE
N GONORRHOEA DNA SPEC QL NAA+PROBE: NEGATIVE

## 2018-05-02 ENCOUNTER — COMMUNICATION - HEALTHEAST (OUTPATIENT)
Dept: OBGYN | Facility: HOSPITAL | Age: 23
End: 2018-05-02

## 2018-05-11 ENCOUNTER — CARE COORDINATION (OUTPATIENT)
Dept: FAMILY MEDICINE | Facility: CLINIC | Age: 23
End: 2018-05-11

## 2018-05-11 DIAGNOSIS — Z34.83 ENCOUNTER FOR SUPERVISION OF OTHER NORMAL PREGNANCY IN THIRD TRIMESTER: ICD-10-CM

## 2018-05-11 NOTE — PROGRESS NOTES
Uncomplicated, full term, normal, spontaneous, vaginal delivery on 2018 at 39 weeks 4 days. Labor duration- 2 hours 11 mins.  Male , 8 lbs 1 oz, 20.25 inches and 33cm.  Breastfeeding.  One and five mins apgar were 8 and 9. Perineal laceration- 2nd degree laceration. Labial laceration on right.  ml.  Pregnancy episode resolved.  Ramiro RN

## 2018-05-23 ENCOUNTER — TELEPHONE (OUTPATIENT)
Dept: FAMILY MEDICINE | Facility: CLINIC | Age: 23
End: 2018-05-23

## 2018-05-23 NOTE — TELEPHONE ENCOUNTER
[facility] phone call message- general phone call:    Reason for call: Per patient requesting a letter for work to state that she is able to return to work on May 30th with no restrictions.    Return call needed: Yes    OK to leave a message on voice mail? Yes    Primary language: English      needed? No    Call taken on May 23, 2018 at 9:07 AM by La Nena Owens

## 2018-05-23 NOTE — LETTER
5/23/2018      RE: Rashmi Oliva  [address] [place] [address]       To whom it may concern,    Rashmi Oliva may return to work on May 30th without restrictions. Please call the clinic with any further questions or concerns.      Parvin Silva MD (PGY2)  Pager: [phone]  [facility] Resident

## 2018-05-25 NOTE — TELEPHONE ENCOUNTER
Patient is calling about letter to check if it has been done, still waiting on a call back about it, notify her that letter has been done on 5/23. She states she will  today, letter is printed at the  for her to .

## 2018-06-05 ENCOUNTER — OFFICE VISIT (OUTPATIENT)
Dept: FAMILY MEDICINE | Facility: CLINIC | Age: 23
End: 2018-06-05
Payer: COMMERCIAL

## 2018-06-05 VITALS
HEART RATE: 79 BPM | WEIGHT: 162 LBS | TEMPERATURE: 97.7 F | DIASTOLIC BLOOD PRESSURE: 75 MMHG | HEIGHT: 61 IN | BODY MASS INDEX: 30.58 KG/M2 | OXYGEN SATURATION: 97 % | SYSTOLIC BLOOD PRESSURE: 118 MMHG

## 2018-06-05 DIAGNOSIS — L02.219 CELLULITIS AND ABSCESS OF TRUNK: ICD-10-CM

## 2018-06-05 DIAGNOSIS — L03.319 CELLULITIS AND ABSCESS OF TRUNK: ICD-10-CM

## 2018-06-05 DIAGNOSIS — Z30.011 ENCOUNTER FOR INITIAL PRESCRIPTION OF CONTRACEPTIVE PILLS: ICD-10-CM

## 2018-06-05 RX ORDER — DESOGESTREL AND ETHINYL ESTRADIOL 0.15-0.03
1 KIT ORAL DAILY
Qty: 84 TABLET | Refills: 1 | Status: SHIPPED | OUTPATIENT
Start: 2018-06-05 | End: 2018-08-08

## 2018-06-05 NOTE — MR AVS SNAPSHOT
"              After Visit Summary   6/5/2018    Rashmi Oliva    MRN: [id number]           Patient Information     Date Of Birth          1995        Visit Information        Provider Department      6/5/2018 3:00 PM Ricardo Salter MD [facility]        Today's Diagnoses     Routine postpartum follow-up    -  1    Encounter for initial prescription of contraceptive pills        Cellulitis and abscess of trunk           Follow-ups after your visit        Follow-up notes from your care team     Return in about 3 months (around 9/5/2018).      Who to contact     Please call your clinic at [phone] to:    Ask questions about your health    Make or cancel appointments    Discuss your medicines    Learn about your test results    Speak to your doctor            Additional Information About Your Visit        [employer] Information     [employer] gives you secure access to your electronic health record. If you see a primary care provider, you can also send messages to your care team and make appointments. If you have questions, please call your primary care clinic.  If you do not have a primary care provider, please call [phone] and they will assist you.      [employer] is an electronic gateway that provides easy, online access to your medical records. With [employer], you can request a clinic appointment, read your test results, renew a prescription or communicate with your care team.     To access your existing account, please contact your [facility] or call [phone] for assistance.        Care EveryWhere ID     This is your Care EveryWhere ID. This could be used by other organizations to access your [facility] medical records  [id number]        Your Vitals Were     Pulse Temperature Height Last Period Pulse Oximetry BMI (Body Mass Index)    79 97.7  F (36.5  C) (Oral) 5' 1.42\" (156 cm) 07/18/2017 97% 30.2 kg/m2       Blood Pressure from Last 3 Encounters:   06/05/18 " 118/75   04/27/18 120/81   04/20/18 126/83    Weight from Last 3 Encounters:   06/05/18 162 lb (73.5 kg)   04/27/18 181 lb 2 oz (82.2 kg)   04/20/18 179 lb 3.2 oz (81.3 kg)              Today, you had the following     No orders found for display         Today's Medication Changes          These changes are accurate as of 6/5/18 11:59 PM.  If you have any questions, ask your nurse or doctor.               Start taking these medicines.        Dose/Directions    desogestrel-ethinyl estradiol 0.15-30 MG-MCG per tablet   Commonly known as:  APRI   Used for:  Routine postpartum follow-up, Encounter for initial prescription of contraceptive pills   Started by:  Ricardo Slater MD        Dose:  1 tablet   Take 1 tablet by mouth daily   Quantity:  84 tablet   Refills:  1            Where to get your medicines      These medications were sent to [id number] - [place], [place] - [address], [place] [address]     Phone:  [phone]     desogestrel-ethinyl estradiol 0.15-30 MG-MCG per tablet                Primary Care Provider Office Phone # Fax #    Caridad Munoz DO [facility] [phone]       [address]   [facility] [address]        Equal Access to Services     ROBERT CASTORENA AH: Hadii ramiro vela hadasho Soeverardo, waaxda luqadaha, qaybta kaalmada edmar, yesenia sainz. So [facility] [phone].    ATENCIÓN: Si habla español, tiene a busch disposición servicios gratuitos de asistencia lingüística. Eric al [phone].    We comply with applicable federal civil rights laws and [place] laws. We do not discriminate on the basis of race, color, national origin, age, disability, sex, sexual orientation, or gender identity.            Thank you!     Thank you for choosing [facility]  for your care. Our goal is always to provide you with excellent care. Hearing back  from our patients is one way we can continue to improve our services. Please take a few minutes to complete the written survey that you may receive in the mail after your visit with us. Thank you!             Your Updated Medication List - Protect others around you: Learn how to safely use, store and throw away your medicines at www.disposemymeds.org.          This list is accurate as of 6/5/18 11:59 PM.  Always use your most recent med list.                   Brand Name Dispense Instructions for use Diagnosis    calcium carbonate 500 MG chewable tablet    TUMS    150 tablet    Take 1 tablet (500 mg) by mouth 2 times daily as needed for heartburn    Encounter for supervision of other normal pregnancy in second trimester       desogestrel-ethinyl estradiol 0.15-30 MG-MCG per tablet    APRI    84 tablet    Take 1 tablet by mouth daily    Routine postpartum follow-up, Encounter for initial prescription of contraceptive pills       Prenatal Vitamins 28-0.8 MG Tabs     90 tablet    Take 1 tablet by mouth daily    Encounter for supervision of other normal pregnancy in first trimester       ranitidine 150 MG tablet    ZANTAC    60 tablet    Take 1 tablet (150 mg) by mouth 2 times daily    Gastroesophageal reflux disease, esophagitis presence not specified

## 2018-06-05 NOTE — PROGRESS NOTES
"Rashmi Oliva is a 23 year old  female presenting for routine postpartum follow up.      Date of Delivery was 18.  Route of delivery was vaginal deliver.Complications noted during the pregnancy include none.  Complications at the time of delivery include none.  There was a second degree tear that was repaired in the usual fashion. The patient notes no worrisome bleeding since since the time of delivery.  She has some pain in her perineum near her tears, fells a bump.  Patient is bottle feeding.  Patient denies concerns with postpartum blues/depression.  Patient has not resumed sexual activity and is interested in contraception at this time.      Other concerns today include none.      ROS:  General: No fevers  Cardiac: No chest pain or palpitations.  Breasts: no redness, warmth, pain  Pulmonary: No shortness of breath or respiratory distress  GI: No abdominal pain.  Normal bowel habits, no incontinence  : No dysuria, hematuria, no incontinence, some pain on left side of labia  Extremities: No edema       Allergies   Allergen Reactions     Banana Swelling     Swollen throat     Sulfa Drugs Hives       Current Outpatient Prescriptions   Medication Sig Dispense Refill     Prenatal Vit-Fe Fumarate-FA (PRENATAL VITAMINS) 28-0.8 MG TABS Take 1 tablet by mouth daily 90 tablet 11     calcium carbonate (TUMS) 500 MG chewable tablet Take 1 tablet (500 mg) by mouth 2 times daily as needed for heartburn (Patient not taking: Reported on 2018) 150 tablet 0     ranitidine (ZANTAC) 150 MG tablet Take 1 tablet (150 mg) by mouth 2 times daily (Patient not taking: Reported on 2018) 60 tablet 3       Past Medical History:   Diagnosis Date     NO ACTIVE PROBLEMS          Vitals: /75  Pulse 79  Temp 97.7  F (36.5  C) (Oral)  Ht 5' 1.42\" (156 cm)  Wt 162 lb (73.5 kg)  LMP 2017  SpO2 97%  BMI 30.2 kg/m2   General: No apparent Distress   HENT: Normocephalic atraumatic    Neck: No thyromegaly or cervical " adenopathy.  Cardiovascular: RRR no murmurs, rubs, or gallops  Pulmonary: Clear to auscultation bilaterally.  No rhonchi, crackles, or wheezes.  No distress.  Abdomen: Soft, non-tender.  No rebound or guarding.   GYN: The uterus is normal non-gravid size, nontender, normal external genitalia well healed from 2nd degree tear/repair, there is a [id number] abscess with a small head on the lateral aspect of left labia minora that is tender to palpation.   Lower extremity: No significant swelling or erythema.      Assessment/Plan    1) Routine Post Partum Care-  Overall Patient doing well.  Post partum depression has not been a concern.  Appears to be transitioning well with new child.  All patients questions were answered.       2) Abscess: small with a small head already present. Quite painful to touch, so did not attempt I&D. Discussed this as an option, but will first trial warm water tub soaks. Rashmi was told to come in sooner if area becomes more painful, increasing redness of the skin, or fevers start.    --Follow up in 3 months  --Labs today none  --Contraception monophasic COCPs    Ricardo Slater MD  Family Medicine Resident, R3    Precepted with Dr. Desai

## 2018-06-06 ASSESSMENT — PATIENT HEALTH QUESTIONNAIRE - PHQ9: SUM OF ALL RESPONSES TO PHQ QUESTIONS 1-9: 2

## 2018-06-13 PROBLEM — Z34.93 ENCOUNTER FOR SUPERVISION OF NORMAL PREGNANCY IN THIRD TRIMESTER: Status: RESOLVED | Noted: 2017-09-22 | Resolved: 2018-06-13

## 2018-06-15 NOTE — PROGRESS NOTES
Preceptor Attestation:  Patient's case reviewed and discussed with  Patient seen and discussed with the resident..  I agree with written assessment and plan of care.  Supervising Physician:  Lety Desai MD  [facility]

## 2018-07-09 ENCOUNTER — MEDICAL CORRESPONDENCE (OUTPATIENT)
Dept: HEALTH INFORMATION MANAGEMENT | Facility: CLINIC | Age: 23
End: 2018-07-09

## 2018-08-08 ENCOUNTER — OFFICE VISIT (OUTPATIENT)
Dept: FAMILY MEDICINE | Facility: CLINIC | Age: 23
End: 2018-08-08
Payer: COMMERCIAL

## 2018-08-08 VITALS
BODY MASS INDEX: 31.45 KG/M2 | SYSTOLIC BLOOD PRESSURE: 113 MMHG | WEIGHT: 166.6 LBS | RESPIRATION RATE: 18 BRPM | TEMPERATURE: 98.2 F | HEART RATE: 80 BPM | HEIGHT: 61 IN | DIASTOLIC BLOOD PRESSURE: 70 MMHG | OXYGEN SATURATION: 96 %

## 2018-08-08 DIAGNOSIS — O21.9 NAUSEA/VOMITING IN PREGNANCY: ICD-10-CM

## 2018-08-08 DIAGNOSIS — Z34.81 ENCOUNTER FOR SUPERVISION OF OTHER NORMAL PREGNANCY IN FIRST TRIMESTER: ICD-10-CM

## 2018-08-08 DIAGNOSIS — N91.2 ABSENCE OF MENSTRUATION: Primary | ICD-10-CM

## 2018-08-08 LAB — HCG UR QL: POSITIVE

## 2018-08-08 RX ORDER — PYRIDOXINE HCL (VITAMIN B6) 25 MG
25 TABLET ORAL DAILY
Qty: 30 TABLET | Refills: 1 | Status: SHIPPED | OUTPATIENT
Start: 2018-08-08 | End: 2018-12-12

## 2018-08-08 RX ORDER — PNV NO.95/FERROUS FUM/FOLIC AC 28MG-0.8MG
1 TABLET ORAL DAILY
Qty: 90 TABLET | Refills: 11 | Status: SHIPPED | OUTPATIENT
Start: 2018-08-08 | End: 2019-05-03
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
## 2018-08-08 NOTE — MR AVS SNAPSHOT
After Visit Summary   8/8/2018    Rashmi Oliva    MRN: [id number]           Patient Information     Date Of Birth          1995        Visit Information        Provider Department      8/8/2018 9:20 AM Ghislaine Ma MD [facility]        Today's Diagnoses     Absence of menstruation    -  1    Encounter for supervision of other normal pregnancy in first trimester        Nausea/vomiting in pregnancy           Follow-ups after your visit        Your next 10 appointments already scheduled     Sep 06, 2018 10:20 AM CDT   NEW OB with Parvin Silva MD   [facility] ([facility])    [address] [place] [address]   [phone]              Who to contact     Please call your clinic at [phone] to:    Ask questions about your health    Make or cancel appointments    Discuss your medicines    Learn about your test results    Speak to your doctor            Additional Information About Your Visit        MyChart Information     [employer] gives you secure access to your electronic health record. If you see a primary care provider, you can also send messages to your care team and make appointments. If you have questions, please call your primary care clinic.  If you do not have a primary care provider, please call [phone] and they will assist you.      [employer] is an electronic gateway that provides easy, online access to your medical records. With [employer], you can request a clinic appointment, read your test results, renew a prescription or communicate with your care team.     To access your existing account, please contact your [facility] or call [phone] for assistance.        Care EveryWhere ID     This is your Care EveryWhere ID. This could be used by other organizations to access your [place] medical records  [id number]        Your Vitals Were     Pulse Temperature Respirations Height Pulse Oximetry BMI  "(Body Mass Index)    80 98.2  F (36.8  C) (Oral) 18 5' 1.42\" (156 cm) 96% 31.05 kg/m2       Blood Pressure from Last 3 Encounters:   08/08/18 113/70   06/05/18 118/75   04/27/18 120/81    Weight from Last 3 Encounters:   08/08/18 166 lb 9.6 oz (75.6 kg)   06/05/18 162 lb (73.5 kg)   04/27/18 181 lb 2 oz (82.2 kg)              We Performed the Following     HCG Qualitative Urine (UPT)  ([facility])          Today's Medication Changes          These changes are accurate as of 8/8/18  2:38 PM.  If you have any questions, ask your nurse or doctor.               Start taking these medicines.        Dose/Directions    pyridOXINE 25 MG tablet   Commonly known as:  vitamin B-6   Used for:  Nausea/vomiting in pregnancy   Started by:  Ghislaine Ma MD        Dose:  25 mg   Take 1 tablet (25 mg) by mouth daily   Quantity:  30 tablet   Refills:  1            Where to get your medicines      These medications were sent to [id number] - [place], [place] - [address] AT [facility] & [address], [place] [address]     Phone:  [phone]     Prenatal Vitamins 28-0.8 MG Tabs    pyridOXINE 25 MG tablet                Primary Care Provider Office Phone # Fax #    Caridad HouseDO [phone]       [address] [place] [address]        Equal Access to Services     LUIS FERNANDO CASTORENA AH: Hadleon vela hadasho Soomaali, waaxda luqadaha, qaybta kaalmada adeegyada, yesenia sainz. So [facility] [phone].    ATENCIÓN: Si habla español, tiene a busch disposición servicios gratuitos de asistencia lingüística. Llame al [phone].    We comply with applicable federal civil rights laws and [place] laws. We do not discriminate on the basis of race, color, national origin, age, disability, sex, sexual orientation, or gender identity.            Thank you!     Thank you for choosing [facility]  for " your care. Our goal is always to provide you with excellent care. Hearing back from our patients is one way we can continue to improve our services. Please take a few minutes to complete the written survey that you may receive in the mail after your visit with us. Thank you!             Your Updated Medication List - Protect others around you: Learn how to safely use, store and throw away your medicines at www.disposemymeds.org.          This list is accurate as of 8/8/18  2:38 PM.  Always use your most recent med list.                   Brand Name Dispense Instructions for use Diagnosis    Prenatal Vitamins 28-0.8 MG Tabs     90 tablet    Take 1 tablet by mouth daily    Encounter for supervision of other normal pregnancy in first trimester       pyridOXINE 25 MG tablet    vitamin B-6    30 tablet    Take 1 tablet (25 mg) by mouth daily    Nausea/vomiting in pregnancy

## 2018-08-08 NOTE — PROGRESS NOTES
Preceptor Attestation:   Patient seen, evaluated and discussed with the resident. I have verified the content of the note, which accurately reflects my assessment of the patient and the plan of care.    Supervising Physician:Teto Michel MD    [facility]

## 2018-08-08 NOTE — NURSING NOTE
OB intake completed with review of recent intake information, unchanged personal and family medical history. Rashmi is a 22 yo, Hmong, English speaking female returning to [facility] for a second pregnancy with [facility]. Last pregnancy was uncomplicated, full term, normal, spontaneous, vaginal delivery about 3 months ago. This pregnancy was unplanned, Rashmi states not ready but desired. She had missed 2 days of her oral contraceptive.  with history of one miscarriage and one .   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 Tobacco use- quit one year ago since became pregnant with her first born, last alcohol drink was 2018 on her 's birthday.  LMP 2018, first cycle since vaginal birth 2018.  No concerns voiced during intake. Would like to see Dr Silva again for prenatal care for this pregnancy. NOB will be scheduled for 3 weeks from today. Ramiro BLACKMAN    Average Risk Category  No significant risk factors: No    At Risk Category (up to 3)  Teen pregnancy: No  Poor social situation: No  Domestic abuse: No  Financial difficulties: No  Smoker: No  H/O  deliver: No  H/O drug abuse: No  Non-English speaking: No  Advanced maternal age: No  GDM risks: No  Previous C/S: No  H/O PIH: No  H/O STIs: No  H/O mental health concerns: No  Onset care > 20 weeks: No  Other: short interval between pregnancies    High Risk Category (4 or more At Risk or)  Diabetes/GDM: No  Multiple gestation: No  Chronic hypertension: No  Significant hx of asthma: No  Fetal demise > 20 weeks: No  Positive tox screen: No  Current mental health treatment: No  Other: NONE    Risk: Average Risk   Date determined: 2018

## 2018-08-24 ENCOUNTER — TELEPHONE (OUTPATIENT)
Dept: FAMILY MEDICINE | Facility: CLINIC | Age: 23
End: 2018-08-24

## 2018-08-24 ENCOUNTER — OFFICE VISIT - HEALTHEAST (OUTPATIENT)
Dept: FAMILY MEDICINE | Facility: CLINIC | Age: 23
End: 2018-08-24

## 2018-08-24 ENCOUNTER — COMMUNICATION - HEALTHEAST (OUTPATIENT)
Dept: FAMILY MEDICINE | Facility: CLINIC | Age: 23
End: 2018-08-24

## 2018-08-24 DIAGNOSIS — R30.0 BURNING WITH URINATION: ICD-10-CM

## 2018-08-24 DIAGNOSIS — N39.0 URINARY TRACT INFECTION: ICD-10-CM

## 2018-08-24 DIAGNOSIS — O23.41 URINARY TRACT INFECTION IN MOTHER DURING FIRST TRIMESTER OF PREGNANCY: Primary | ICD-10-CM

## 2018-08-24 LAB
ALBUMIN UR-MCNC: ABNORMAL MG/DL
APPEARANCE UR: CLEAR
BACTERIA #/AREA URNS HPF: ABNORMAL HPF
BILIRUB UR QL STRIP: NEGATIVE
COLOR UR AUTO: YELLOW
GLUCOSE UR STRIP-MCNC: NEGATIVE MG/DL
HGB UR QL STRIP: NEGATIVE
KETONES UR STRIP-MCNC: ABNORMAL MG/DL
LEUKOCYTE ESTERASE UR QL STRIP: ABNORMAL
NITRATE UR QL: NEGATIVE
PH UR STRIP: 6.5 [PH] (ref 5–8)
RBC #/AREA URNS AUTO: ABNORMAL HPF
SP GR UR STRIP: 1.02 (ref 1–1.03)
SQUAMOUS #/AREA URNS AUTO: ABNORMAL LPF
UROBILINOGEN UR STRIP-ACNC: ABNORMAL
WBC #/AREA URNS AUTO: ABNORMAL HPF

## 2018-08-24 NOTE — TELEPHONE ENCOUNTER
Spoke with Rashmi, no further appt available at [facility] for today. In first trimester of pregnancy, c/o experiencing dysuria, increase in frequency and urgency x 2 days. No hematuria, low abdominal or back pain. No fevers but have been experiencing chills. Hx- UTI. Recommend Rashmi be seen at Urgent Care, she states understanding and will go as recommended. Ramiro BLACKMAN

## 2018-08-24 NOTE — TELEPHONE ENCOUNTER
[facility] phone call message-patient reporting a symptom:     Symptom: Patient states that she is having pain when urinating, and is going every 15-20 minutes. Thinks that she has a UTI. She has tried some cranberry juice and it hasn't helped her.     Same Day Visit Offered: No openings today, currently pregnant.     Additional comments:     OK to leave message on voice mail? Yes    Primary language: English      needed? No    Call taken on August 24, 2018 at 1:05 PM by Stephanie Sawant

## 2018-08-25 LAB — BACTERIA SPEC CULT: NO GROWTH

## 2018-08-25 NOTE — TELEPHONE ENCOUNTER
Patient called in to on-call line with questions regarding medication. She is currently in the first trimester of pregnancy and having UTI symptoms. We tried to get her into clinic today, but we had no availabilities so she was seen at [facility]. UA consistent with UTI and she was given Keflex BID and Pyridium TID PRN for pain. She is confused regarding these medications.    I explained that she should take the Keflex antibiotic twice daily no matter what for 7 days and then use the Pyridium for urinary pain as needed up to 3 times daily. I also explained both were safe for this stage of pregnancy. I also explained that a urine culture is pending and we will call her if the antibiotic needs to be changed. She was understanding of the above and will call or make an appointment in clinic if her symptoms do not improve or worsen.    Ildefonso Barton MD  [facility] Residency Program, PGY-2

## 2018-09-06 ENCOUNTER — TELEPHONE (OUTPATIENT)
Dept: FAMILY MEDICINE | Facility: CLINIC | Age: 23
End: 2018-09-06

## 2018-09-06 ENCOUNTER — OFFICE VISIT (OUTPATIENT)
Dept: FAMILY MEDICINE | Facility: CLINIC | Age: 23
End: 2018-09-06
Payer: COMMERCIAL

## 2018-09-06 VITALS
SYSTOLIC BLOOD PRESSURE: 127 MMHG | WEIGHT: 162.6 LBS | BODY MASS INDEX: 30.31 KG/M2 | DIASTOLIC BLOOD PRESSURE: 76 MMHG | OXYGEN SATURATION: 97 % | HEART RATE: 91 BPM | TEMPERATURE: 97.9 F

## 2018-09-06 DIAGNOSIS — B96.89 BV (BACTERIAL VAGINOSIS): ICD-10-CM

## 2018-09-06 DIAGNOSIS — Z34.81 ENCOUNTER FOR SUPERVISION OF NORMAL PREGNANCY IN MULTIGRAVIDA IN FIRST TRIMESTER: Primary | ICD-10-CM

## 2018-09-06 DIAGNOSIS — N76.0 BV (BACTERIAL VAGINOSIS): ICD-10-CM

## 2018-09-06 LAB
BACTERIA: NORMAL
CLUE CELLS: NORMAL
HEMOGLOBIN: 13.4 G/DL (ref 11.7–15.7)
HIV 1+2 AB+HIV1 P24 AG SERPL QL IA: NEGATIVE
MOTILE TRICHOMONAS: NEGATIVE
ODOR: NORMAL
PH WET PREP: NORMAL
WBC WET PREP: NORMAL
YEAST: NEGATIVE

## 2018-09-06 RX ORDER — METRONIDAZOLE 7.5 MG/G
1 GEL VAGINAL 2 TIMES DAILY
Qty: 70 G | Refills: 0 | Status: SHIPPED | OUTPATIENT
Start: 2018-09-06 | End: 2019-02-07

## 2018-09-06 NOTE — TELEPHONE ENCOUNTER
----- Message from Parvin Silva MD sent at 9/6/2018  2:29 PM CDT -----  Please call patient and inform her I have prescribed her a vaginal medication for bacterial vaginosis. This condition is very common, it is NOT an STI, and it is from a shift in the normal vaginal bacteria yuko. Please call the clinic with any further questions or concerns.
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
## 2018-09-06 NOTE — PROGRESS NOTES
Preceptor Attestation:   Patient seen, evaluated and discussed with the resident. I have verified the content of the note, which accurately reflects my assessment of the patient and the plan of care.  Supervising Physician:Xi Fierro MD  [facility]

## 2018-09-06 NOTE — MR AVS SNAPSHOT
After Visit Summary   9/6/2018    Rashmi Oliva    MRN: [id number]           Patient Information     Date Of Birth          1995        Visit Information        Provider Department      9/6/2018 10:20 AM Parvin Silva MD [facility]        Today's Diagnoses     Encounter for supervision of normal pregnancy in multigravida in first trimester    -  1    BV (bacterial vaginosis)           Follow-ups after your visit        Your next 10 appointments already scheduled     Oct 05, 2018  2:00 PM CDT   RETURN OB with Parvin Silva MD   [facility] ([facility])    [address] [place] [id number]   [phone]              Who to contact     Please call your clinic at [phone] to:    Ask questions about your health    Make or cancel appointments    Discuss your medicines    Learn about your test results    Speak to your doctor            Additional Information About Your Visit        MyChart Information     [employer] gives you secure access to your electronic health record. If you see a primary care provider, you can also send messages to your care team and make appointments. If you have questions, please call your primary care clinic.  If you do not have a primary care provider, please call [phone] and they will assist you.      [employer] is an electronic gateway that provides easy, online access to your medical records. With [employer], you can request a clinic appointment, read your test results, renew a prescription or communicate with your care team.     To access your existing account, please contact your [facility] or call [phone] for assistance.        Care EveryWhere ID     This is your Care EveryWhere ID. This could be used by other organizations to access your [place] medical records  [id number]        Your Vitals Were     Pulse Temperature Last Period Pulse Oximetry BMI (Body Mass Index)       91 97.9   F (36.6  C) (Oral) 06/22/2018 (Exact Date) 97% 30.31 kg/m2        Blood Pressure from Last 3 Encounters:   09/06/18 127/76   08/08/18 113/70   06/05/18 118/75    Weight from Last 3 Encounters:   09/06/18 162 lb 9.6 oz (73.8 kg)   08/08/18 166 lb 9.6 oz (75.6 kg)   06/05/18 162 lb (73.5 kg)              We Performed the Following     ABO/Rh Type-HML ([facility])     Antibody Screen ([facility])     Chlamydia/Gono Amplified ([facility])     Hemoglobin (HGB) ([facility])     Hepatitis B Surface Ag ([facility])     HIV Ag/Ab Screen Starr ([facility])     Rubella  IgG ([facility])     Syphilis Screen Starr ([facility])     Wet Prep ([facility])          Today's Medication Changes          These changes are accurate as of 9/6/18 11:59 PM.  If you have any questions, ask your nurse or doctor.               Start taking these medicines.        Dose/Directions    doxylamine 25 MG Tabs tablet   Commonly known as:  UNISOM   Used for:  Encounter for supervision of normal pregnancy in multigravida in first trimester   Started by:  Parvin Silva MD        Dose:  25 mg   Take 1 tablet (25 mg) by mouth At Bedtime   Quantity:  60 each   Refills:  1       metroNIDAZOLE 0.75 % vaginal gel   Commonly known as:  METROGEL   Used for:  BV (bacterial vaginosis)   Started by:  Parvin Silva MD        Dose:  1 applicator   Place 1 applicator (5 g) vaginally 2 times daily for 5 days   Quantity:  70 g   Refills:  0            Where to get your medicines      These medications were sent to [id number] - [place], [place] - [address] AT [facility] & [address], [place] [address]     Phone:  [phone]     doxylamine 25 MG Tabs tablet    metroNIDAZOLE 0.75 % vaginal gel                Primary Care Provider Office Phone #    Mary DO Jesus [phone]       [facility] [place] [address]        Equal Access to Services     [facility] KELL AH: Hadii aad ku  maría Magana, walaneyda luboniadaha, qaybta kagiovanni hyatt, yesenia maryamkiana escobargerman alistair. So [facility] [phone].    ATENCIÓN: Si habla español, tiene a busch disposición servicios gratuitos de asistencia lingüística. Eric al [phone].    We comply with applicable federal civil rights laws and [place] laws. We do not discriminate on the basis of race, color, national origin, age, disability, sex, sexual orientation, or gender identity.            Thank you!     Thank you for choosing [facility]  for your care. Our goal is always to provide you with excellent care. Hearing back from our patients is one way we can continue to improve our services. Please take a few minutes to complete the written survey that you may receive in the mail after your visit with us. Thank you!             Your Updated Medication List - Protect others around you: Learn how to safely use, store and throw away your medicines at www.disposemymeds.org.          This list is accurate as of 9/6/18 11:59 PM.  Always use your most recent med list.                   Brand Name Dispense Instructions for use Diagnosis    doxylamine 25 MG Tabs tablet    UNISOM    60 each    Take 1 tablet (25 mg) by mouth At Bedtime    Encounter for supervision of normal pregnancy in multigravida in first trimester       metroNIDAZOLE 0.75 % vaginal gel    METROGEL    70 g    Place 1 applicator (5 g) vaginally 2 times daily for 5 days    BV (bacterial vaginosis)       Prenatal Vitamins 28-0.8 MG Tabs     90 tablet    Take 1 tablet by mouth daily    Encounter for supervision of other normal pregnancy in first trimester       pyridOXINE 25 MG tablet    vitamin B-6    30 tablet    Take 1 tablet (25 mg) by mouth daily    Nausea/vomiting in pregnancy

## 2018-09-07 LAB
ABO + RH BLD: NORMAL
BLD GP AB SCN SERPL QL: NEGATIVE
C TRACH RRNA CVX QL NAA+PROBE: NEGATIVE
HBV SURFACE AG SERPL QL IA: NEGATIVE
N GONORRHOEA RRNA SPEC QL NAA+PROBE: NEGATIVE
REPEAT ABO/RH TYPING (HML): NORMAL
RUBV IGG SERPL QL IA: POSITIVE
TREPONEMA ANTIBODY (SYPHILIS): NEGATIVE

## 2018-09-12 ENCOUNTER — TELEPHONE (OUTPATIENT)
Dept: FAMILY MEDICINE | Facility: CLINIC | Age: 23
End: 2018-09-12

## 2018-09-12 NOTE — TELEPHONE ENCOUNTER
Currently is 11 weeks 5 days gestation pregnant, c/o cold symptoms and would like to know what she is able to take. C/o rhinorrhea, nasal congestion, sneezing and coughing. Denies fever/ chills. Her son was sick 2 days ago with fever and rhinorrhea. Recommend she can use warm compress to facial sinuses, drink frequent sips of water to rinse her throat to see if this helps decrease coughing/ soothes throat. Can also use humidifier to see if this helps her symptoms. Recommend getting plenty of rest and fluid intake. If symptoms worsens, recommend being seen in clinic as she is early in gestation and no medication is recommended at this stage in pregnancy. Ramiro BLACKMAN

## 2018-09-13 DIAGNOSIS — Z34.81 ENCOUNTER FOR SUPERVISION OF NORMAL PREGNANCY IN MULTIGRAVIDA IN FIRST TRIMESTER: ICD-10-CM

## 2018-09-16 ENCOUNTER — TELEPHONE (OUTPATIENT)
Dept: FAMILY MEDICINE | Facility: CLINIC | Age: 23
End: 2018-09-16

## 2018-09-16 NOTE — TELEPHONE ENCOUNTER
On call provider was called for acute illness    Rashmi is a 23-year-old female currently pregnant at 12 weeks gestation who is calling in today for concerns of acute illness.  Had call earlier in the week noting cold like symptoms with associated rhinorrhea, nasal congestion, coughing, fever and chills.  Recent sick contact with child at home with similar symptoms.  Return call today to ask if she is able to take any medication for acute illness.  Was told before to hold off any medication given that she was within her first trimester.  Has not tried taking medication was wondering if any medications available to help relieve her symptoms.  Has been drinking plenty of water and denies any acute worsening.      Discussed with patient importance of avoiding any type of over-the-counter medication to prevent complication with pregnancy which patient is agreeable to.  Discussed use of nasal saline spray to help with nasal congestion and following up in clinic within 1-2 days for reassessment.  Patient agreed and will try nasal saline at this point.  We will not take any other medication until seen in the office with PCP.    Harvey Monroy MD  [facility]  Pager: [phone]

## 2018-10-05 ENCOUNTER — OFFICE VISIT (OUTPATIENT)
Dept: FAMILY MEDICINE | Facility: CLINIC | Age: 23
End: 2018-10-05
Payer: COMMERCIAL

## 2018-10-05 VITALS
DIASTOLIC BLOOD PRESSURE: 82 MMHG | HEIGHT: 62 IN | SYSTOLIC BLOOD PRESSURE: 126 MMHG | OXYGEN SATURATION: 99 % | HEART RATE: 82 BPM | WEIGHT: 160.6 LBS | BODY MASS INDEX: 29.55 KG/M2 | TEMPERATURE: 97.9 F

## 2018-10-05 DIAGNOSIS — Z34.81 ENCOUNTER FOR SUPERVISION OF NORMAL PREGNANCY IN MULTIGRAVIDA IN FIRST TRIMESTER: Primary | ICD-10-CM

## 2018-10-05 NOTE — MR AVS SNAPSHOT
"              After Visit Summary   10/5/2018    Rashmi Oliva    MRN: [id number]           Patient Information     Date Of Birth          1995        Visit Information        Provider Department      10/5/2018 2:00 PM Parvin Silva MD [facility]        Today's Diagnoses     Encounter for supervision of normal pregnancy in multigravida in first trimester    -  1       Follow-ups after your visit        Who to contact     Please call your clinic at [phone] to:    Ask questions about your health    Make or cancel appointments    Discuss your medicines    Learn about your test results    Speak to your doctor            Additional Information About Your Visit        MyChart Information     [employer] gives you secure access to your electronic health record. If you see a primary care provider, you can also send messages to your care team and make appointments. If you have questions, please call your primary care clinic.  If you do not have a primary care provider, please call [phone] and they will assist you.      [employer] is an electronic gateway that provides easy, online access to your medical records. With [employer], you can request a clinic appointment, read your test results, renew a prescription or communicate with your care team.     To access your existing account, please contact your [facility] or call [phone] for assistance.        Care EveryWhere ID     This is your Care EveryWhere ID. This could be used by other organizations to access your [place] medical records  [id number]        Your Vitals Were     Pulse Temperature Height Last Period Pulse Oximetry BMI (Body Mass Index)    82 97.9  F (36.6  C) (Oral) 5' 1.61\" (156.5 cm) 06/22/2018 (Exact Date) 99% 29.74 kg/m2       Blood Pressure from Last 3 Encounters:   10/05/18 126/82   09/06/18 127/76   08/08/18 113/70    Weight from Last 3 Encounters:   10/05/18 160 lb 9.6 oz (72.8 kg)   09/06/18 162 " lb 9.6 oz (73.8 kg)   08/08/18 166 lb 9.6 oz (75.6 kg)              Today, you had the following     No orders found for display       Primary Care Provider Office Phone # Fax #    Mary Lee [phone]       [facility] [place] [address]        Equal Access to Services     ROBERT CASTORENA : Hadii aad ku hadasho Soomaali, waaxda luqadaha, qaybta kaalmada adeegyada, waxay idiin hayaan adeeg khlarisash lairineon . So [facility] [phone].    ATENCIÓN: Si habla español, tiene a busch disposición servicios gratuitos de asistencia lingüística. Llame al [phone].    We comply with applicable federal civil rights laws and [place] laws. We do not discriminate on the basis of race, color, national origin, age, disability, sex, sexual orientation, or gender identity.            Thank you!     Thank you for choosing [facility]  for your care. Our goal is always to provide you with excellent care. Hearing back from our patients is one way we can continue to improve our services. Please take a few minutes to complete the written survey that you may receive in the mail after your visit with us. Thank you!             Your Updated Medication List - Protect others around you: Learn how to safely use, store and throw away your medicines at www.disposemymeds.org.          This list is accurate as of 10/5/18  2:33 PM.  Always use your most recent med list.                   Brand Name Dispense Instructions for use Diagnosis    doxylamine 25 MG Tabs tablet    UNISOM    60 each    Take 1 tablet (25 mg) by mouth At Bedtime    Encounter for supervision of normal pregnancy in multigravida in first trimester       Prenatal Vitamins 28-0.8 MG Tabs     90 tablet    Take 1 tablet by mouth daily    Encounter for supervision of other normal pregnancy in first trimester       pyridOXINE 25 MG tablet    vitamin B-6    30 tablet    Take 1 tablet (25 mg) by mouth daily    Nausea/vomiting in pregnancy

## 2018-10-05 NOTE — PROGRESS NOTES
"SUBJECTIVE  No vaginal bleeding, loss of fluids, or cramping noted. No headaches, change in vision, RUQ pain, or extremity edema. Patient has not felt baby move. She has had nausea without vomiting, mainly able to tolerate vegetables. Taking B6 qam and PNV with doxylamine at bedtime - this is helping.     OBJECTIVE  /82  Pulse 82  Temp 97.9  F (36.6  C) (Oral)  Ht 5' 1.61\" (156.5 cm)  Wt 160 lb 9.6 oz (72.8 kg)  LMP 2018 (Exact Date)  SpO2 99%  BMI 29.74 kg/m2    General: sitting up in a chair awake and alert. Pleasant and cooperative in NAD.  Cardio: RRR  Resp: clear vesicular breath sounds bilaterally, no increased work of breathing  Abd: gravid, non-tender  Skin: no lesions or rashes  Psych: mood good, affect congruent. Mentation and conversation appropriate.    ASSESSMENT/PLAN  Patient is a  a 23 year old  female at 15w0d corresponding to DARLENE of  Mar 29, 2019 obtained via LMP c/w first trimester US.   - recent delivery on 18. Baby boy, Abdoulaye  - prenatal labs unremarkable  --> O positive, hgb 13.4. BV that was treated  - pregravid BMI 30, recommended weight gain 15-25lbs, TWG today -5lb  - planning on vaginal delivery  - formula feeding    Follow up in 5 weeks for GODWIN with FH and FHT    Precepted with: MD Parvin Mackenzie MD (PGY3)  Pager: [phone]  [facility]    "

## 2018-10-25 ENCOUNTER — TELEPHONE (OUTPATIENT)
Dept: FAMILY MEDICINE | Facility: CLINIC | Age: 23
End: 2018-10-25

## 2018-10-25 DIAGNOSIS — K59.01 SLOW TRANSIT CONSTIPATION: Primary | ICD-10-CM

## 2018-10-25 RX ORDER — POLYETHYLENE GLYCOL 3350 17 G/17G
1 POWDER, FOR SOLUTION ORAL DAILY
Qty: 510 G | Refills: 11 | Status: SHIPPED | OUTPATIENT
Start: 2018-10-25 | End: 2018-11-12

## 2018-10-25 RX ORDER — AMOXICILLIN 250 MG
1 CAPSULE ORAL 2 TIMES DAILY
Qty: 60 TABLET | Refills: 11 | Status: SHIPPED | OUTPATIENT
Start: 2018-10-25 | End: 2018-11-12

## 2018-10-25 NOTE — TELEPHONE ENCOUNTER
[facility] phone call message- medication clarification/question:    Full Medication Name: Stool softeners     Question: Pt is requesting stool softeners. States she feels constipated and is having some bleeding when she goes to bathroom. Please call and advise.     Pharmacy confirmed as [id number] - [place], [place] - [address] AT [facility]: Yes    Primary language: English      needed? No    Call taken on October 25, 2018 at 11:38 AM by Maggie Mayorga

## 2018-11-12 ENCOUNTER — OFFICE VISIT (OUTPATIENT)
Dept: FAMILY MEDICINE | Facility: CLINIC | Age: 23
End: 2018-11-12
Payer: COMMERCIAL

## 2018-11-12 VITALS
HEART RATE: 83 BPM | DIASTOLIC BLOOD PRESSURE: 67 MMHG | WEIGHT: 162.6 LBS | HEIGHT: 62 IN | RESPIRATION RATE: 18 BRPM | SYSTOLIC BLOOD PRESSURE: 110 MMHG | TEMPERATURE: 97.7 F | BODY MASS INDEX: 29.92 KG/M2 | OXYGEN SATURATION: 96 %

## 2018-11-12 DIAGNOSIS — Z34.81 ENCOUNTER FOR SUPERVISION OF NORMAL PREGNANCY IN MULTIGRAVIDA IN FIRST TRIMESTER: Primary | ICD-10-CM

## 2018-11-12 RX ORDER — DOCUSATE SODIUM 100 MG/1
100 CAPSULE, LIQUID FILLED ORAL DAILY
COMMUNITY
Start: 2018-05-01 | End: 2018-11-12

## 2018-11-12 RX ORDER — DOCUSATE SODIUM 100 MG/1
100 CAPSULE, LIQUID FILLED ORAL DAILY
Qty: 90 CAPSULE | Refills: 1 | Status: SHIPPED | OUTPATIENT
Start: 2018-11-12 | End: 2018-12-12

## 2018-11-12 NOTE — PROGRESS NOTES
"SUBJECTIVE  No vaginal bleeding, loss of fluids, or cramping noted. No headaches, change in vision, RUQ pain, or extremity edema. Patient has started felt baby move. She has been taking her pre- vitamin daily.    OBJECTIVE  /67  Pulse 83  Temp 97.7  F (36.5  C)  Resp 18  Ht 5' 1.5\" (156.2 cm)  Wt 162 lb 9.6 oz (73.8 kg)  LMP 2018 (Exact Date)  SpO2 96%  BMI 30.23 kg/m2    General: sitting up in a chair awake and alert. Pleasant and cooperative in NAD.  Cardio: RRR  Resp: clear vesicular breath sounds bilaterally, no increased work of breathing  Abd: gravid, non-tender  Skin: no lesions or rashes  Psych: mood good, affect congruent. Mentation and conversation appropriate.    ASSESSMENT/PLAN  Patient is a  a 23 year old  female at 20w3d corresponding to DARLENE of  Mar 29, 2019 obtained via LMP c/w first trimester US.   - recent delivery on 18. Baby boy, Abdoulaye  - prenatal labs unremarkable  --> O positive, hgb 13.4. BV that was treated  - anatomy US ordered today  - pregravid BMI 30, recommended weight gain 15-25lbs, TWG today -3lb  - planning on vaginal delivery  - formula feeding    Precepted with: MD Parvin Cordero MD (PGY3)  Pager: [phone]  [facility]      Preceptor Attestation:  I saw and evaluated the patient.  I reviewed the resident physician's history, exam, and treatment plan; and I agree with the documentation by the resident physician.  Supervising Physician:  Beto Hu MD    "

## 2018-11-12 NOTE — MR AVS SNAPSHOT
After Visit Summary   11/12/2018    Rashmi Oliva    MRN: [id number]           Patient Information     Date Of Birth          1995        Visit Information        Provider Department      11/12/2018 3:40 PM Parvin Silva MD [facility]        Today's Diagnoses     Encounter for supervision of normal pregnancy in multigravida in first trimester    -  1       Follow-ups after your visit        Your next 10 appointments already scheduled     Nov 29, 2018 10:00 AM CST   ULTRASOUND with Linda Vizcaino   [facility] ([facility])    [address] [place] [address]   [phone]           An adult must be present if children accompany the patient being seen.            Dec 10, 2018  2:00 PM CST   RETURN OB with Parvin Silva MD   [facility] ([facility])    [address] [place] [address]   [phone]              Who to contact     Please call your clinic at [phone] to:    Ask questions about your health    Make or cancel appointments    Discuss your medicines    Learn about your test results    Speak to your doctor            Additional Information About Your Visit        MyChart Information     [facility] [facility] gives you secure access to your electronic health record. If you see a primary care provider, you can also send messages to your care team and make appointments. If you have questions, please call your primary care clinic.  If you do not have a primary care provider, please call [phone] and they will assist you.      [facility] [facility] is an electronic gateway that provides easy, online access to your medical records. With [facility], you can request a clinic appointment, read your test results, renew a prescription or communicate with your care team.     To access your existing account, please contact your [facility] or call [phone] for assistance.        Care EveryWhere ID     This is  "your Care EveryWhere ID. This could be used by other organizations to access your [place] medical records  [id number]        Your Vitals Were     Pulse Temperature Respirations Height Last Period Pulse Oximetry    83 97.7  F (36.5  C) 18 5' 1.5\" (156.2 cm) 06/22/2018 (Exact Date) 96%    BMI (Body Mass Index)                   30.23 kg/m2            Blood Pressure from Last 3 Encounters:   11/12/18 110/67   10/05/18 126/82   09/06/18 127/76    Weight from Last 3 Encounters:   11/12/18 162 lb 9.6 oz (73.8 kg)   10/05/18 160 lb 9.6 oz (72.8 kg)   09/06/18 162 lb 9.6 oz (73.8 kg)                 Today's Medication Changes          These changes are accurate as of 11/12/18 11:59 PM.  If you have any questions, ask your nurse or doctor.               Stop taking these medicines if you haven't already. Please contact your care team if you have questions.     polyethylene glycol powder   Commonly known as:  MIRALAX   Stopped by:  Parvin Silva MD           senna-docusate 8.6-50 MG per tablet   Commonly known as:  SENOKOT-S;PERICOLACE   Stopped by:  Parvin Silva MD                Where to get your medicines      These medications were sent to [id number] - [place], [place] - [address] AT [address], [place] [address]     Phone:  [phone]     docusate sodium 100 MG capsule                Primary Care Provider Office Phone # Fax #    Mary DO Jesus [phone]       [facility] [place] [address]        Equal Access to Services     ROBERT CASTORENA AH: Hadii ramiro Magana, waaxda luqadaha, qaybta kaalmada adeegyada, yesenia sainz. So [facility] [phone].    ATENCIÓN: Si habla español, tiene a busch disposición servicios gratuitos de asistencia lingüística. Llame al [phone].    We comply with applicable federal civil rights laws and [place] laws. We do not " discriminate on the basis of race, color, national origin, age, disability, sex, sexual orientation, or gender identity.            Thank you!     Thank you for choosing [facility]  for your care. Our goal is always to provide you with excellent care. Hearing back from our patients is one way we can continue to improve our services. Please take a few minutes to complete the written survey that you may receive in the mail after your visit with us. Thank you!             Your Updated Medication List - Protect others around you: Learn how to safely use, store and throw away your medicines at www.disposemymeds.org.          This list is accurate as of 11/12/18 11:59 PM.  Always use your most recent med list.                   Brand Name Dispense Instructions for use Diagnosis    docusate sodium 100 MG capsule    COLACE    90 capsule    Take 1 capsule (100 mg) by mouth daily    Encounter for supervision of normal pregnancy in multigravida in first trimester       doxylamine 25 MG Tabs tablet    UNISOM    60 each    Take 1 tablet (25 mg) by mouth At Bedtime    Encounter for supervision of normal pregnancy in multigravida in first trimester       Prenatal Vitamins 28-0.8 MG Tabs     90 tablet    Take 1 tablet by mouth daily    Encounter for supervision of other normal pregnancy in first trimester       pyridOXINE 25 MG tablet    vitamin B-6    30 tablet    Take 1 tablet (25 mg) by mouth daily    Nausea/vomiting in pregnancy

## 2018-11-29 DIAGNOSIS — Z34.82 NORMAL PREGNANCY IN MULTIGRAVIDA IN SECOND TRIMESTER: Primary | ICD-10-CM

## 2018-11-29 DIAGNOSIS — Z34.82 NORMAL PREGNANCY IN MULTIGRAVIDA IN SECOND TRIMESTER: ICD-10-CM

## 2018-12-12 ENCOUNTER — OFFICE VISIT (OUTPATIENT)
Dept: FAMILY MEDICINE | Facility: CLINIC | Age: 23
End: 2018-12-12
Payer: COMMERCIAL

## 2018-12-12 VITALS
BODY MASS INDEX: 30.29 KG/M2 | DIASTOLIC BLOOD PRESSURE: 75 MMHG | RESPIRATION RATE: 18 BRPM | TEMPERATURE: 97.4 F | HEART RATE: 100 BPM | SYSTOLIC BLOOD PRESSURE: 122 MMHG | HEIGHT: 62 IN | WEIGHT: 164.6 LBS | OXYGEN SATURATION: 96 %

## 2018-12-12 DIAGNOSIS — K21.9 GASTROESOPHAGEAL REFLUX DISEASE, ESOPHAGITIS PRESENCE NOT SPECIFIED: ICD-10-CM

## 2018-12-12 DIAGNOSIS — Z34.81 ENCOUNTER FOR SUPERVISION OF NORMAL PREGNANCY IN MULTIGRAVIDA IN FIRST TRIMESTER: Primary | ICD-10-CM

## 2018-12-12 DIAGNOSIS — Z23 IMMUNIZATION DUE: ICD-10-CM

## 2018-12-12 RX ORDER — CALCIUM CARBONATE 750 MG/1
750 TABLET, CHEWABLE ORAL 3 TIMES DAILY PRN
Qty: 60 TABLET | Refills: 1 | Status: SHIPPED | OUTPATIENT
Start: 2018-12-12 | End: 2019-04-09

## 2018-12-12 ASSESSMENT — MIFFLIN-ST. JEOR: SCORE: 1451.93

## 2018-12-12 NOTE — PROGRESS NOTES
"SUBJECTIVE  No vaginal bleeding, loss of fluids, or cramping noted. No headaches, change in vision, RUQ pain, or extremity edema. Patient continues to feel baby move. She has been able to tolerate a diet with no significant nausea/vomitting. She has been taking her pre-gianni vitamin.    She was having some reflux. Had this during previous pregnancy that was alleviated with TUMS for 2-3 months then later with zantac.    OBJECTIVE  /75   Pulse 100   Temp 97.4  F (36.3  C)   Resp 18   Ht 1.562 m (5' 1.5\")   Wt 74.7 kg (164 lb 9.6 oz)   LMP 2018 (Exact Date)   SpO2 96%   BMI 30.60 kg/m      General: sitting up in a chair awake and alert. Pleasant and cooperative in NAD.  Cardio: RRR  Resp: clear vesicular breath sounds bilaterally, no increased work of breathing  Abd: gravid, non-tender  Skin: no lesions or rashes  Psych: mood good, affect congruent. Mentation and conversation appropriate.    ASSESSMENT/PLAN  Patient is a  a 23 year old  female at 24w5d corresponding to DARLENE of  Mar 29, 2019 obtained via LMP c/w first trimester US. Expecting a baby girl.  - recent delivery on 18. Baby boy, Abdoulaye  - prenatal labs unremarkable  --> O positive, hgb 13.4. BV that was treated  - anatomy US normal with good interval growth  - pregravid BMI 30, recommended weight gain 15-25lbs, TWG today -0.5lb  - flu vaccination given today ()  - planning on vaginal delivery  - formula feeding  - GERD: TUMS trial--> zantac    Precepted with: Dr. Cherise Silva MD (PGY3)  Pager: [phone]  [facility] Res[facility]    "

## 2018-12-12 NOTE — NURSING NOTE
Injectable influenza vaccine documentation    1. Has the patient received the information for the influenza vaccine? YES    2. Does the patient have a severe allergy to eggs (Patients with a severe egg allergy should be assessed by a medical provider, RN, or clinical pharmacist. If they receive the influenza vaccine, please have them observed for 15 minutes.)? No    3. Has the patient had an allergic reaction to previous influenza vaccines? No    4. Has the patient had any severe allergic reactions to past influenza vaccines ? No       5. Does patient have a history of Guillain-Chandler syndrome? No      Based on responses above, I administered the influenza vaccine.    ZACH Marquez

## 2019-01-09 ENCOUNTER — OFFICE VISIT (OUTPATIENT)
Dept: FAMILY MEDICINE | Facility: CLINIC | Age: 24
End: 2019-01-09
Payer: COMMERCIAL

## 2019-01-09 VITALS
OXYGEN SATURATION: 98 % | RESPIRATION RATE: 20 BRPM | BODY MASS INDEX: 30.99 KG/M2 | TEMPERATURE: 97.9 F | WEIGHT: 168.4 LBS | HEIGHT: 62 IN | SYSTOLIC BLOOD PRESSURE: 106 MMHG | DIASTOLIC BLOOD PRESSURE: 71 MMHG | HEART RATE: 73 BPM

## 2019-01-09 DIAGNOSIS — Z23 IMMUNIZATION DUE: ICD-10-CM

## 2019-01-09 DIAGNOSIS — Z34.83 ENCOUNTER FOR SUPERVISION OF OTHER NORMAL PREGNANCY IN THIRD TRIMESTER: Primary | ICD-10-CM

## 2019-01-09 PROBLEM — Z34.93 ENCOUNTER FOR SUPERVISION OF NORMAL PREGNANCY IN THIRD TRIMESTER: Status: ACTIVE | Noted: 2018-09-06

## 2019-01-09 LAB
GLU GEST SCREEN 1HR 50G: 160 (ref 0–129)
HEMOGLOBIN: 11.9 G/DL (ref 11.7–15.7)

## 2019-01-09 ASSESSMENT — MIFFLIN-ST. JEOR: SCORE: 1469.17

## 2019-01-09 NOTE — PROGRESS NOTES
"SUBJECTIVE  No vaginal bleeding, loss of fluids, or cramping noted. No headaches, change in vision, RUQ pain, or extremity edema. Patient continues to feel baby move. She has been taking her pre-gianni vitamin.    OBJECTIVE  /71   Pulse 73   Temp 97.9  F (36.6  C)   Resp 20   Ht 1.562 m (5' 1.5\")   Wt 76.4 kg (168 lb 6.4 oz)   LMP 2018 (Exact Date)   SpO2 98%   BMI 31.30 kg/m      General: sitting up in a chair awake and alert. Pleasant and cooperative in NAD.  Cardio: RRR  Resp: clear vesicular breath sounds bilaterally, no increased work of breathing  Abd: gravid, non-tender  Skin: no lesions or rashes  Psych: mood good, affect congruent. Mentation and conversation appropriate.    ASSESSMENT/PLAN  Patient is a  a 23 year old  female at 28w5d corresponding to DARLENE of  Mar 29, 2019 obtained via LMP c/w first trimester US. Expecting a baby girl.  - recent delivery on 18. Baby boy, Abdoulaye  - prenatal labs unremarkable  --> O positive, hgb 13.4. BV that was treated  - anatomy US normal with good interval growth  - pregravid BMI 30, recommended weight gain 15-25lbs, TWG today 2.5lb  - flu vaccination given ()  - 1 hour GCT today  - third trimester hemoglobin and rpr today  - Tdap given ()  - planning on vaginal delivery  - formula feeding  - GERD: TUMS trial, continue for now    Follow up in 3 weeks for GODWIN care    Precepted with: MD Parvin Villela MD (PGY3)  Pager: [phone]  [facility] Res[facility]    "

## 2019-01-10 LAB — TREPONEMA ANTIBODY (SYPHILIS): NEGATIVE

## 2019-01-16 NOTE — PROGRESS NOTES
Preceptor Attestation:  Patient's case reviewed and discussed with Parvin Silva MD resident and I evaluated the patient. I agree with written assessment and plan of care.  Supervising Physician:  Pro Pérez MD MD  [facility]

## 2019-01-18 ENCOUNTER — TELEPHONE (OUTPATIENT)
Dept: FAMILY MEDICINE | Facility: CLINIC | Age: 24
End: 2019-01-18

## 2019-01-18 DIAGNOSIS — O24.419 GESTATIONAL DIABETES MELLITUS (GDM), ANTEPARTUM, GESTATIONAL DIABETES METHOD OF CONTROL UNSPECIFIED: Primary | ICD-10-CM

## 2019-01-18 DIAGNOSIS — Z34.83 ENCOUNTER FOR SUPERVISION OF OTHER NORMAL PREGNANCY IN THIRD TRIMESTER: ICD-10-CM

## 2019-01-18 DIAGNOSIS — O24.415 GESTATIONAL DIABETES MELLITUS (GDM) IN THIRD TRIMESTER CONTROLLED ON ORAL HYPOGLYCEMIC DRUG: Primary | ICD-10-CM

## 2019-01-18 LAB
GLU, 1 HOUR, 100 G: 213 MG/DL
GLU, 2 HOUR, 100 G: 171 MG/DL
GLU, 3 HOUR, 100 G: 141 MG/DL
GLUCOSE P FAST SERPL-MCNC: 82 MG/DL

## 2019-01-18 NOTE — TELEPHONE ENCOUNTER
Called patient to discuss GDM during pregnancy and advise of metformin prescription and blood glucose monitoring supplies. Requested patient  prescriptions from pharmacy and begin taking metformin over the weekend per . Also requested patient come in during business hours on Monday for diabetic monitoring teaching from one of the RNs.  stated patient can decide between one hour and two hour post-prandials and document which for blood glucose log. Patient to follow-up in clinic with  one week after blood glucose monitoring and arrive to clinic with log.Patient verbalized understanding. Adal BLACKMAN

## 2019-01-18 NOTE — TELEPHONE ENCOUNTER
Message to physician: Drug not covered by patient plan.Please change to alternative is One Touch Ultra Blue.    Date of last visit: 1/9/2019     Date of next visit if scheduled: Visit date 01/30/2019    Last Comprehensive Metabolic Panel:  No results found for: NA, POTASSIUM, CHLORIDE, CO2, ANIONGAP, GLC, BUN, CR, GFRESTIMATED, JACOB    BP Readings from Last 3 Encounters:   01/09/19 106/71   12/12/18 122/75   11/12/18 110/67       No results found for: A1C             Please complete refill and CLOSE ENCOUNTER.  Closing the encounter signifies the refill is complete.

## 2019-01-23 RX ORDER — BLOOD-GLUCOSE METER
EACH MISCELLANEOUS
Qty: 1 KIT | Refills: 0 | Status: SHIPPED | OUTPATIENT
Start: 2019-01-23 | End: 2019-02-07

## 2019-01-25 ENCOUNTER — TELEPHONE (OUTPATIENT)
Dept: FAMILY MEDICINE | Facility: CLINIC | Age: 24
End: 2019-01-25

## 2019-01-25 DIAGNOSIS — Z34.83 ENCOUNTER FOR SUPERVISION OF OTHER NORMAL PREGNANCY IN THIRD TRIMESTER: Primary | ICD-10-CM

## 2019-01-25 RX ORDER — CALCIUM CARBONATE 500 MG/1
1 TABLET, CHEWABLE ORAL 4 TIMES DAILY PRN
Qty: 90 TABLET | Refills: 3 | Status: SHIPPED | OUTPATIENT
Start: 2019-01-25 | End: 2019-01-25

## 2019-01-25 NOTE — TELEPHONE ENCOUNTER
[facility] phone call message- medication clarification/question:    Full Medication Name: heart burn   Dose:     Question:      Pharmacy confirmed as [id number] - [place], [place] - [address]  AT [facility]: Yes    OK to leave a message on voice mail? Yes    Primary language: English      needed? No    Call taken on January 25, 2019 at 3:55 PM by Stephanie Sawant

## 2019-01-30 ENCOUNTER — OFFICE VISIT (OUTPATIENT)
Dept: FAMILY MEDICINE | Facility: CLINIC | Age: 24
End: 2019-01-30
Payer: COMMERCIAL

## 2019-01-30 VITALS
TEMPERATURE: 98.7 F | DIASTOLIC BLOOD PRESSURE: 68 MMHG | WEIGHT: 168.6 LBS | SYSTOLIC BLOOD PRESSURE: 105 MMHG | BODY MASS INDEX: 31.03 KG/M2 | HEIGHT: 62 IN | OXYGEN SATURATION: 100 % | RESPIRATION RATE: 20 BRPM | HEART RATE: 76 BPM

## 2019-01-30 DIAGNOSIS — Z34.83 ENCOUNTER FOR SUPERVISION OF OTHER NORMAL PREGNANCY IN THIRD TRIMESTER: Primary | ICD-10-CM

## 2019-01-30 DIAGNOSIS — O24.415 GESTATIONAL DIABETES MELLITUS (GDM) IN THIRD TRIMESTER CONTROLLED ON ORAL HYPOGLYCEMIC DRUG: ICD-10-CM

## 2019-01-30 ASSESSMENT — MIFFLIN-ST. JEOR: SCORE: 1470.07

## 2019-01-30 NOTE — PROGRESS NOTES
I have reviewed the history and physical examination. I was present for key portions of the visit and agree with the assessment and plan as documented above by Dr. Silva for 23 yr old  @ 31w 5d here for prenatal care. FHT/ FH appropriate.  Current issues include 1) GDM - lengthy review of fasting and post-prandial sugar logs today; will inc metformin and have her see Dr Mccullough next week to discuss insulin.  /term labor precautions given.  Continue routine prenatal care.     Td Sevilla MD  2019  10:56 AM

## 2019-01-30 NOTE — PROGRESS NOTES
"SUBJECTIVE  No vaginal bleeding, loss of fluids, or cramping noted. No headaches, change in vision, RUQ pain, or extremity edema. Patient continues to feel baby move. She has been taking her pre-gianni vitamin.    Gestational Diabetes  Recently diagnosed.  Metformin 500mg bid    Fastin, 87, 88, 104, 101, 85, 83  2 hours post-prandial  - Breakfast: 147, 143, 111, 115, 103, 145  - Lunch: 115, 126, 172, 88, 127, 153, 87, 65  - Supper: 123, 175, 139, 182, 182, 119, 98, 249    OBJECTIVE  /68   Pulse 76   Temp 98.7  F (37.1  C)   Resp 20   Ht 1.562 m (5' 1.5\")   Wt 76.5 kg (168 lb 9.6 oz)   LMP 2018 (Exact Date)   SpO2 100%   BMI 31.34 kg/m      General: sitting up in a chair awake and alert. Pleasant and cooperative in NAD.  Cardio: RRR  Resp: clear vesicular breath sounds bilaterally, no increased work of breathing  Abd: gravid, non-tender  Skin: no lesions or rashes  Psych: mood good, affect congruent. Mentation and conversation appropriate.    ASSESSMENT/PLAN  Patient is a  a 23 year old  female at 31w5d corresponding to DARLENE of  Mar 29, 2019 obtained via LMP c/w first trimester US. Expecting a baby girl.  - recent delivery on 18. Baby boy, Abdoulaye  - prenatal labs unremarkable  --> O positive, hgb 13.4. BV that was treated  - anatomy US normal with good interval growth  - pregravid BMI 30, recommended weight gain 15-25lbs, TWG today 2.10lb  - flu vaccination given ()  - third trimester hemoglobin 11.9 + rpr negative  - Tdap given ()  - planning on vaginal delivery  - formula feeding  - GERD: TUMS trial, zantac ordered and will try this    Gestational Diabetes  Majority of post-prandials are above goal.   - increase metformin to max dose; 500 bid--> 1000 bid. If not at goal will likely start insulin  - work on triggers as well (juice, etc)  - weekly GODWIN  - weekly BPPs  - growth US around 36 weeks    Precepted with: Td Sevilla MD    Follow up with weekly GODWIN and BPPs    Parvin " MD Ricardo (PGY3)  Pager: [phone]  [facility] Res[facility]

## 2019-02-02 ENCOUNTER — TELEPHONE (OUTPATIENT)
Dept: FAMILY MEDICINE | Facility: CLINIC | Age: 24
End: 2019-02-02

## 2019-02-03 NOTE — TELEPHONE ENCOUNTER
Patient called in to on-call provider about umbilical discomfort.  Noted that about a day ago, it was having more pain around her umbilicus whenever she bends down.  No pain when she is at rest but could feel low discomfort.  She is able to sleep at night and when laying down would experience no pain whatsoever.  Has not used any medication or over-the-counter treatment.  Currently not using abdominal binder.  Denies any nausea, vomiting, diarrhea, chest pain, shortness of breath.  She is currently pregnant and denies any episode of decreased fetal movement.  No gush of fluid or vaginal bleed.  At this time likely having round ligament referred pain given more discomfort with bending down.  Did discuss following up with Dr. Silva in clinic early this coming week.  Patient was okay with plan and will call back if any other concerns.  Did recommend that she comes in for further evaluation if symptoms worsen.    Harvey Monroy MD  [facility]  Pager: [phone]

## 2019-02-07 ENCOUNTER — OFFICE VISIT (OUTPATIENT)
Dept: PHARMACY | Facility: CLINIC | Age: 24
End: 2019-02-07
Payer: COMMERCIAL

## 2019-02-07 DIAGNOSIS — O24.415 GESTATIONAL DIABETES MELLITUS (GDM) IN THIRD TRIMESTER CONTROLLED ON ORAL HYPOGLYCEMIC DRUG: Primary | ICD-10-CM

## 2019-02-07 DIAGNOSIS — Z34.83 ENCOUNTER FOR SUPERVISION OF OTHER NORMAL PREGNANCY IN THIRD TRIMESTER: ICD-10-CM

## 2019-02-07 DIAGNOSIS — O24.415 GESTATIONAL DIABETES MELLITUS (GDM) IN THIRD TRIMESTER CONTROLLED ON ORAL HYPOGLYCEMIC DRUG: ICD-10-CM

## 2019-02-07 RX ORDER — INSULIN GLARGINE 100 [IU]/ML
8 INJECTION, SOLUTION SUBCUTANEOUS AT BEDTIME
Qty: 15 ML | Refills: 0 | Status: SHIPPED | OUTPATIENT
Start: 2019-02-07 | End: 2019-04-09

## 2019-02-07 NOTE — PATIENT INSTRUCTIONS
Stop the Metformin    Start using 8 units of insulin each night before bed    Continue to check your blood sugars as you have been, however remember to count the time from your first bite of food.     Start using a milk jug to put your sharps in before putting in trash      Patient Education     Hypoglycemia (Low Blood Sugar)     Fast-acting sugar includes a cup of nonfat milk.   Too little sugar (glucose) in your blood is called hypoglycemia or low blood sugar. Low blood sugar usually means anything lower than 70 mg/dL. Talk with your healthcare provider about your target range and what level is too low for you. Diabetes itself doesn t cause low blood sugar. But some of the treatments for diabetes, such as pills or insulin, may raise your risk for it. Low blood sugar may cause you to pass out or have a seizure. So always treat low blood sugar right away, but don't overeat.  Special note: Always carry a source of fast-acting sugar and a snack in case of hypoglycemia.   What you may notice  If you have low blood sugar, you may have one or more of these symptoms:    Shakiness or dizziness    Cold, clammy skin or sweating    Feelings of hunger    Headache    Nervousness    A hard, fast heartbeat    Weakness    Confusion or irritability    Blurred vision    Having nightmares or waking up confused or sweating    Numbness or tingling in the lips or tongue  What you should do  Here are tips to follow if you have hypoglycemia:     First check your blood sugar. If it is too low (out of your target range), eat or drink 15 to 20 grams of fast-acting sugar. This may be 3 to 4 glucose tablets, 4 ounces (half a cup) of fruit juice or regular (nondiet) soda, 8 ounces (1 cup) of fat-free milk, or 1 tablespoon of honey. Don t take more than this, or your blood sugar may go too high.    Wait 15 minutes. Then recheck your blood sugar if you can.    If your blood sugar is still too low, repeat the steps above and check your blood sugar  again. If your blood sugar still has not returned to your target range, contact your healthcare provider or seek emergency care.    Once your blood sugar returns to target range, eat a snack or meal.  Preventing low blood sugar  Things you can do include the following:     If your condition needs a strict treatment plan, eat your meals and snacks at the same times each day. Don t skip meals!    If your treatment plan lets you change when you eat and what you eat, learn how to change the time and dose of your rapid-acting insulin to match this.     Ask your healthcare provider if it is safe for you to drink alcohol. Never drink on an empty stomach.    Take your medicine at the prescribed times.    Always carry a source of fast-acting sugar and a snack when you re away from home.  Other things to do  Additional tips include the following:    Carry a medical ID card, a compact USB drive, or wear a medical alert bracelet or necklace. It should say that you have diabetes. It should also say what to do if you pass out or have a seizure.    Make sure your family, friends, and coworkers know the signs of low blood sugar. Tell them what to do if your blood sugar falls very low and you can t treat yourself.    Keep a glucagon emergency kit handy. Be sure your family, friends, and coworkers know how and when to use it. Check it regularly and replace the glucagon before it expires.    Talk with your health care team about other things you can do to prevent low blood sugar.     If you have unexplained hypoglycemia or hypoglycemia several times, call your healthcare provider.   Date Last Reviewed: 5/1/2016 2000-2018 [employer]. [address], [place] [address]. All rights reserved. This information is not intended as a substitute for professional medical care. Always follow your healthcare professional's instructions.
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
## 2019-02-14 ENCOUNTER — OFFICE VISIT (OUTPATIENT)
Dept: FAMILY MEDICINE | Facility: CLINIC | Age: 24
End: 2019-02-14
Payer: COMMERCIAL

## 2019-02-14 VITALS
BODY MASS INDEX: 31.06 KG/M2 | RESPIRATION RATE: 20 BRPM | DIASTOLIC BLOOD PRESSURE: 65 MMHG | OXYGEN SATURATION: 94 % | TEMPERATURE: 97.7 F | HEART RATE: 77 BPM | WEIGHT: 168.8 LBS | SYSTOLIC BLOOD PRESSURE: 102 MMHG | HEIGHT: 62 IN

## 2019-02-14 DIAGNOSIS — O24.415 GESTATIONAL DIABETES MELLITUS (GDM) IN THIRD TRIMESTER CONTROLLED ON ORAL HYPOGLYCEMIC DRUG: ICD-10-CM

## 2019-02-14 DIAGNOSIS — Z34.83 ENCOUNTER FOR SUPERVISION OF OTHER NORMAL PREGNANCY IN THIRD TRIMESTER: Primary | ICD-10-CM

## 2019-02-14 DIAGNOSIS — Z34.83 ENCOUNTER FOR SUPERVISION OF OTHER NORMAL PREGNANCY IN THIRD TRIMESTER: ICD-10-CM

## 2019-02-14 ASSESSMENT — MIFFLIN-ST. JEOR: SCORE: 1470.98

## 2019-02-14 NOTE — PROGRESS NOTES
Preceptor Attestation:   Patient seen, evaluated and discussed with the resident. I have verified the content of the note, which accurately reflects my assessment of the patient and the plan of care.  Supervising Physician:Homer Ma MD  [facility]

## 2019-02-14 NOTE — PROGRESS NOTES
"SUBJECTIVE  No vaginal bleeding, loss of fluids, or cramping noted. No headaches, change in vision, RUQ pain, or extremity edema. Patient continues to feel baby move. She has been taking her pre-gianni vitamin.    Gestational Diabetes  Metformin 1000 mg bid.  Was given insulin last week during pharmacy visit, but she has not yet picked this up d/t being busy with work. 8 units lantus qhs    Diet: now snacking on vegetables instead of junk food/sweet foods and has noticed improved blood sugars    Blood sugars:  Fasting: (95 goal)  - 93, 81, 87, 79, 88, 83, 76    1 hours post-prandial: (140 goal)  - breakfast: 147, 145  - lunch: 138, 139, 136, 105  - dinner: 130, 147, 146    2 hours post-prandial: (120 goal)  - breakfast: 120, 133, 111, 140, 127  - lunch: 79, 127, 111  - dinner: 117, 119, 126, 123    OBJECTIVE  /65   Pulse 77   Temp 97.7  F (36.5  C)   Resp 20   Ht 1.562 m (5' 1.5\")   Wt 76.6 kg (168 lb 12.8 oz)   LMP 2018 (Exact Date)   SpO2 94%   BMI 31.38 kg/m      General: sitting up in a chair awake and alert. Pleasant and cooperative in NAD.  Cardio: RRR  Resp: clear vesicular breath sounds bilaterally, no increased work of breathing  Abd: gravid, non-tender  Skin: no lesions or rashes  Psych: mood good, affect congruent. Mentation and conversation appropriate.    ASSESSMENT/PLAN  Patient is a  a 23 year old  female at 33w6d corresponding to DARLENE of  Mar 29, 2019 obtained via LMP c/w first trimester US. Expecting a baby girl.  - recent delivery on 18. Baby boy, Abdoulaye  - prenatal labs unremarkable  --> O positive, hgb 13.4. BV that was treated  - anatomy US normal with good interval growth  - pregravid BMI 30, recommended weight gain 15-25lbs, TWG today ~3lb  - flu vaccination given ()  - 1 hour GCT today  - third trimester hemoglobin and rpr today  - Tdap given ()  - planning on vaginal delivery  - formula feeding  - GERD: TUMS trial, continue for now    GDM, starting " insulin  - blood sugars borderline, but >50% mildly above goal  - continue metformin + start lantus which will likely bring blood sugars to goal  - currently getting weekly BPPs, today is 8/8  - growth US ~36 weeks  - as patient in on insulin, timing of delivery likely ~39-40 weeks    Precepted with: MD Parvin Tinajero MD (PGY3)  Pager: [phone]  [facility]

## 2019-02-20 ENCOUNTER — TELEPHONE (OUTPATIENT)
Dept: FAMILY MEDICINE | Facility: CLINIC | Age: 24
End: 2019-02-20

## 2019-02-20 NOTE — TELEPHONE ENCOUNTER
Spoke with Rashmi, she has picked up Basaglar on Thursday, 2/14/2019 and has started insulin- 8 units at bedtime. C/o dizziness 10-15 mins post administration, lasting 15-20 mins or until she fells asleep. When lay down, c/o can smell insulin in nose. Awake in the morning, feeling fine, dizziness resolved. Reported blood sugars- morning fasting and 2 hours postprandials (unless indicated as 1 hour postprandial) are as follows:         Date      Time       Blood sugar                                         2/14/19      950a               90                    12  p             140                    745p             110                   1130p            140  (1 hr pp)    2/15/19      0700a             78                    1045a            100                    235p              139  (1 hr pp)                    845p              125    2/16/19     1032a              88                   1239p              154  (1 hr pp)                    400p               120                    930p               111    2/17/19      930a               87                   130p               136  (1 hr pp)                   1140p             133  (1 hr pp)    2/18/19     No time given     81-fasting                   1130a       117                    930p                127    2/19/19     1045a                83                   100pm             122                    436p                103                    1106p              140  (1 hr pp)    2/20/19     930a                76        Rashmi will be seeing Dr Silva tomorrow, 2/21/2019 after her BPP is completed in clinic.  Ramiro BLACKMAN

## 2019-02-20 NOTE — TELEPHONE ENCOUNTER
Requesting call to patient to ensure was able to  and start insulin therapy. Routed to triage/OB nurses.     Gayathri Sotomayor, PharmD  [facility]  Phone: [phone]  February 20, 2019 at 10:10 AM

## 2019-02-21 ENCOUNTER — OFFICE VISIT (OUTPATIENT)
Dept: FAMILY MEDICINE | Facility: CLINIC | Age: 24
End: 2019-02-21
Payer: COMMERCIAL

## 2019-02-21 ENCOUNTER — TELEPHONE (OUTPATIENT)
Dept: FAMILY MEDICINE | Facility: CLINIC | Age: 24
End: 2019-02-21

## 2019-02-21 VITALS
HEART RATE: 76 BPM | RESPIRATION RATE: 16 BRPM | BODY MASS INDEX: 31.03 KG/M2 | OXYGEN SATURATION: 98 % | SYSTOLIC BLOOD PRESSURE: 108 MMHG | DIASTOLIC BLOOD PRESSURE: 68 MMHG | HEIGHT: 62 IN | WEIGHT: 168.6 LBS | TEMPERATURE: 97.7 F

## 2019-02-21 DIAGNOSIS — O24.415 GESTATIONAL DIABETES MELLITUS (GDM) IN THIRD TRIMESTER CONTROLLED ON ORAL HYPOGLYCEMIC DRUG: ICD-10-CM

## 2019-02-21 DIAGNOSIS — R30.0 DYSURIA: ICD-10-CM

## 2019-02-21 DIAGNOSIS — Z34.83 ENCOUNTER FOR SUPERVISION OF OTHER NORMAL PREGNANCY IN THIRD TRIMESTER: Primary | ICD-10-CM

## 2019-02-21 DIAGNOSIS — Z34.83 ENCOUNTER FOR SUPERVISION OF OTHER NORMAL PREGNANCY IN THIRD TRIMESTER: ICD-10-CM

## 2019-02-21 LAB
BACTERIA: NORMAL
BILIRUBIN UR: NEGATIVE
BLOOD UR: NEGATIVE
CLARITY, URINE: CLEAR
CLUE CELLS: NORMAL
COLOR UR: YELLOW
GLUCOSE URINE: NEGATIVE
KETONES UR QL: NEGATIVE
LEUKOCYTE ESTERASE UR: ABNORMAL
MOTILE TRICHOMONAS: NEGATIVE
NITRITE UR QL STRIP: NEGATIVE
ODOR: NORMAL
PH UR STRIP: 8.5 [PH] (ref 5–7)
PH WET PREP: NORMAL
PROTEIN UR: ABNORMAL
SP GR UR STRIP: 1.02
UROBILINOGEN UR STRIP-ACNC: ABNORMAL
WBC WET PREP: NORMAL
YEAST: NORMAL

## 2019-02-21 ASSESSMENT — MIFFLIN-ST. JEOR: SCORE: 1478.01

## 2019-02-21 NOTE — PROGRESS NOTES
"SUBJECTIVE  No vaginal bleeding, loss of fluids, or cramping noted. No headaches, change in vision, RUQ pain, or extremity edema. Patient continues to feel baby move. She has been taking her pre-gianni vitamin.    Having some episodic irritation when she urinates. No vaginal discharge, no persistent pain with urination. No fevers/chills, no N/V.    Gestational Diabetes  Metformin 1000 mg bid.  8 units lantus at bedtime (started this last Thursday). Taking it every night before bed. Gets dizzy for about 10-15 minutes after taking it.     Diet: now snacking on vegetables instead of junk food/sweet foods and has noticed improved blood sugars     Blood sugars:  Fasting: (95 goal)  - 78, 88, 87, 81, 83, 76, 88     1 hours post-prandial: (140 goal)  - breakfast: 154, 136, 117, 122, 130  - lunch: 139  - dinner: 133, 127, 140, 130     2 hours post-prandial: (120 goal)  - breakfast: 100  - lunch: 120, 103, 118  - dinner: 125, 111    OBJECTIVE  /68   Pulse 76   Temp 97.7  F (36.5  C) (Oral)   Resp 16   Ht 1.575 m (5' 2\")   Wt 76.5 kg (168 lb 9.6 oz)   LMP 2018 (Exact Date)   SpO2 98%   BMI 30.84 kg/m      General: sitting up in a chair awake and alert. Pleasant and cooperative in NAD.  Cardio: RRR  Resp: clear vesicular breath sounds bilaterally, no increased work of breathing  Abd: gravid, non-tender  Skin: no lesions or rashes  Psych: mood good, affect congruent. Mentation and conversation appropriate.    NST  - baseline 130s  - moderate variability  - multiple accelerations (~5)    Results for orders placed or performed in visit on 19   Urinalysis, Micro If ([facility])   Result Value Ref Range    Specific Gravity Urine 1.020 1.005 - 1.030    pH Urine 8.5 4.5 - 8.0    Leukocyte Esterase UR 2+ (A) NEGATIVE    Nitrite Urine Negative NEGATIVE    Protein UR Trace (A) NEGATIVE    Glucose Urine Negative NEGATIVE    Ketones Urine Negative NEGATIVE    Urobilinogen mg/dL 1.0 E.U./dL (A) 0.2 E.U./dL    Bilirubin " UR Negative NEGATIVE    Blood UR Negative NEGATIVE    Color Urine Yellow     Clarity, urine Clear    Wet Prep ([facility])   Result Value Ref Range    Yeast Wet Prep None none    Motile Trichomonas Wet Prep Negative Negative    Clue Cells Wet Prep Present <20% NONE    WBC WET PREP 5-10 2 - 5    Bacteria Wet Prep Few None    pH Wet Prep Not performed 3.8 - 4.5    Odor Wet Prep None NONE       ASSESSMENT/PLAN  Patient is a  a 23 year old  female at 34w6d corresponding to DARLENE of  Mar 29, 2019 obtained via LMP c/w first trimester US. Expecting a baby girl.  - recent delivery on 18. Baby boy, Abdoulaye  - prenatal labs unremarkable  --> O positive, hgb 13.4. BV that was treated  - anatomy US normal with good interval growth  - pregravid BMI 30, recommended weight gain 15-25lbs, TWG today ~3lb  - flu vaccination given ()  - third trimester hemoglobin 11.9 and rpr negative  - Tdap given ()  - planning on vaginal delivery  - formula feeding  - GERD: TUMS trial, continue for now  - TBD on contraception     GDM, starting insulin  - blood sugars much better controlled now after starting basal insulin  - continue metformin + lantus 8 units  - continue to watch diet  - currently getting weekly BPPs, today is 6/8 (off for fetal breathing). NST performed in clinic to get a score out of 10 and was reactive giving her a score of 8/10. Continue with weekly BPPs; no change currently.  - growth US ~36 weeks  - as patient in on insulin, timing of delivery likely ~39-40 weeks    Mild irritation with urination  Trichomonas during previous pregnancy. Only symptoms today of very mild irritation when she urinates. Not with every urination. No other symptoms. Wet prep and UA today largely unremarkable. Will follow up on UCx and call patient then. If still having symptoms with negative UCx can offer vaginal metronidazole.    Precepted with: MD Parvin Coello MD (PGY3)  Pager: [phone]  [facility]

## 2019-02-21 NOTE — TELEPHONE ENCOUNTER
Received call for abnormal BPP of 6/8 performed in clinic today. Patient has appointment with provider following appointment, provider aware. [facility] verbalized understanding. Adal BLACKMAN

## 2019-02-22 LAB — CULTURE: NORMAL

## 2019-02-22 NOTE — RESULT ENCOUNTER NOTE
Noted negative UCx. Called and discussed with patient. Having mild urinary tingling only when her bladder is very full and she holds her urine too long. No other infectious symptoms. No vaginal discharge or pain. Otherwise feeling well. Wet prep with <20% clue cells. Will hold off on metronidazole treatment for now. Having weekly visits.

## 2019-02-25 NOTE — PROGRESS NOTES
I have reviewed the history and physical examination. I was present for key portions of the visit and agree with the assessment and plan as documented above by Dr. Silva for 24 yr old  @ 34w6d here for prenatal care. FHT/ FH appropriate.  Current issues include 1) Gestational DM - on lantus and metformin; weekly BPPs; 2) dysuria - will check cx.   /term labor precautions given.  Continue routine prenatal care.     Td Sevilla MD  2019  9:02 AM

## 2019-02-28 ENCOUNTER — OFFICE VISIT (OUTPATIENT)
Dept: FAMILY MEDICINE | Facility: CLINIC | Age: 24
End: 2019-02-28
Payer: COMMERCIAL

## 2019-02-28 VITALS
DIASTOLIC BLOOD PRESSURE: 71 MMHG | TEMPERATURE: 97.6 F | SYSTOLIC BLOOD PRESSURE: 105 MMHG | HEART RATE: 87 BPM | WEIGHT: 172.4 LBS | RESPIRATION RATE: 16 BRPM | OXYGEN SATURATION: 100 % | HEIGHT: 62 IN | BODY MASS INDEX: 31.73 KG/M2

## 2019-02-28 DIAGNOSIS — Z34.83 ENCOUNTER FOR SUPERVISION OF OTHER NORMAL PREGNANCY IN THIRD TRIMESTER: Primary | ICD-10-CM

## 2019-02-28 DIAGNOSIS — O24.415 GESTATIONAL DIABETES MELLITUS (GDM) IN THIRD TRIMESTER CONTROLLED ON ORAL HYPOGLYCEMIC DRUG: ICD-10-CM

## 2019-02-28 DIAGNOSIS — Z34.83 ENCOUNTER FOR SUPERVISION OF OTHER NORMAL PREGNANCY IN THIRD TRIMESTER: ICD-10-CM

## 2019-02-28 ASSESSMENT — MIFFLIN-ST. JEOR: SCORE: 1490.25

## 2019-02-28 NOTE — PROGRESS NOTES
"SUBJECTIVE  No vaginal bleeding, loss of fluids, or cramping noted. No headaches, change in vision, RUQ pain, or extremity edema. Patient continues to feel baby move. She has been taking her pre-gianni vitamin.    Gestational Diabetes  8 units lantus at bedtime. Taking it every night before bed.      Diet: now snacking on vegetables instead of junk food/sweet foods and has noticed improved blood sugars     Blood sugars:  Fasting: (95 goal)  - 88, 76, 85, 79, 90, 83, 76     1 hours post-prandial: (140 goal)  - breakfast: 130, 138, 130  - lunch: 140, 125  - dinner: 140, 129, 130, 129     2 hours post-prandial: (120 goal)  - breakfast: 100, 105, 115, 111  - lunch: 119, 81, 115, 112  - dinner: 127, 104, 125    OBJECTIVE  /71   Pulse 87   Temp 97.6  F (36.4  C) (Oral)   Resp 16   Ht 1.575 m (5' 2\")   Wt 78.2 kg (172 lb 6.4 oz)   LMP 2018 (Exact Date)   SpO2 100%   BMI 31.53 kg/m      General: sitting up in a chair awake and alert. Pleasant and cooperative in NAD.  Cardio: RRR  Resp: clear vesicular breath sounds bilaterally, no increased work of breathing  Abd: gravid, non-tender  Skin: no lesions or rashes  Psych: mood good, affect congruent. Mentation and conversation appropriate.    ASSESSMENT/PLAN  Patient is a  a 24 year old  female at 35w6d corresponding to DARLENE of  Mar 29, 2019 obtained via LMP c/w first trimester US. Expecting a baby girl.  - recent delivery on 18. Baby boy, Abdoulaye  - prenatal labs unremarkable  --> O positive, hgb 13.4. BV that was treated  - anatomy US normal with good interval growth  - pregravid BMI 30, recommended weight gain 15-25lbs, TWG today ~6lb  - flu vaccination given ()  - third trimester hemoglobin 11.9 and rpr negative  - Tdap given ()  - planning on vaginal delivery  - formula feeding  - GERD: TUMS trial, continue for now  - TBD on contraception     GDM, starting insulin  - blood sugars much better controlled now after starting basal insulin; " lantus 8 units  - will restart metformin 500mg bid for some mildly elevated dinner time values  - continue to watch diet  - currently getting weekly BPPs, today is 6/8 (off for fetal breathing). NST performed in clinic to get a score out of 10 and was reactive giving her a score of 8/10 which is equivalent to a 10/10. Continue with weekly BPPs; no change currently.  - growth US at 35w6d with percentile 31%  - as patient in on insulin, timing of delivery 39 weeks; discussed with patient.    Precepted with: MD Parvin Coello MD (PGY3)  Pager: [phone]  [facility]

## 2019-03-05 ENCOUNTER — TELEPHONE (OUTPATIENT)
Dept: FAMILY MEDICINE | Facility: CLINIC | Age: 24
End: 2019-03-05

## 2019-03-05 ENCOUNTER — RECORDS - HEALTHEAST (OUTPATIENT)
Dept: ADMINISTRATIVE | Facility: OTHER | Age: 24
End: 2019-03-05

## 2019-03-05 DIAGNOSIS — O24.415 GESTATIONAL DIABETES MELLITUS (GDM) IN THIRD TRIMESTER CONTROLLED ON ORAL HYPOGLYCEMIC DRUG: Primary | ICD-10-CM

## 2019-03-05 NOTE — TELEPHONE ENCOUNTER
Left message for Rashmi to call me back. Need to schedule her at [facility] for BPP w/NST per Dr Silva. In clinic ultrasound not available for this week. Ramiro BLACKMAN

## 2019-03-05 NOTE — TELEPHONE ENCOUNTER
Spoke to Rashmi, BPP with NST scheduled to be done at [facility], Wednesday, 3/6/2019 at 10:00am. Rashmi informed to check in by 9:45am in Radiology, complete BPP.  She then will be directed to maternity to get NST performed. Order has been faxed to [facility] scheduling [phone]. Ramiro BLACKMAN    Rashmi also informs me she has work forms for Dr Silva to complete. Advised she can drop off forms at front, which will start form process routing and get to Dr Silva. Ramiro BLACKMAN

## 2019-03-06 ENCOUNTER — HOSPITAL ENCOUNTER (OUTPATIENT)
Dept: ULTRASOUND IMAGING | Facility: HOSPITAL | Age: 24
Discharge: HOME | End: 2019-03-06

## 2019-03-06 ENCOUNTER — HOSPITAL ENCOUNTER (OUTPATIENT)
Dept: OBGYN | Facility: HOSPITAL | Age: 24
Discharge: HOME | End: 2019-03-06
Attending: FAMILY MEDICINE | Admitting: FAMILY MEDICINE

## 2019-03-06 DIAGNOSIS — O24.415 GESTATIONAL DIABETES MELLITUS (GDM) IN THIRD TRIMESTER CONTROLLED ON ORAL HYPOGLYCEMIC DRUG: ICD-10-CM

## 2019-03-06 ASSESSMENT — MIFFLIN-ST. JEOR: SCORE: 1494.32

## 2019-03-08 ENCOUNTER — OFFICE VISIT (OUTPATIENT)
Dept: FAMILY MEDICINE | Facility: CLINIC | Age: 24
End: 2019-03-08
Payer: COMMERCIAL

## 2019-03-08 VITALS
WEIGHT: 172.4 LBS | OXYGEN SATURATION: 98 % | DIASTOLIC BLOOD PRESSURE: 71 MMHG | TEMPERATURE: 97.7 F | HEIGHT: 63 IN | HEART RATE: 84 BPM | BODY MASS INDEX: 30.55 KG/M2 | RESPIRATION RATE: 18 BRPM | SYSTOLIC BLOOD PRESSURE: 106 MMHG

## 2019-03-08 DIAGNOSIS — Z34.83 ENCOUNTER FOR SUPERVISION OF OTHER NORMAL PREGNANCY IN THIRD TRIMESTER: Primary | ICD-10-CM

## 2019-03-08 DIAGNOSIS — O24.415 GESTATIONAL DIABETES MELLITUS (GDM) IN THIRD TRIMESTER CONTROLLED ON ORAL HYPOGLYCEMIC DRUG: ICD-10-CM

## 2019-03-08 ASSESSMENT — MIFFLIN-ST. JEOR: SCORE: 1506.13

## 2019-03-08 NOTE — PROGRESS NOTES
"SUBJECTIVE  No vaginal bleeding, loss of fluids, or cramping noted. No headaches, change in vision, RUQ pain, or extremity edema. Patient continues to feel baby move. She has been taking her pre-gianni vitamin.    Gestational Diabetes  8 units lantus at bedtime. Taking it every night before bed.   500mg Metformin bid.     Diet: now snacking on vegetables instead of junk food/sweet foods and has noticed improved blood sugars. With the 2 out of range 2 hours post-prandials she had a dessert.     Blood sugars:  Fasting: (95 goal)  - 81, 89, 78, 85, 79, 80, 76, 94     1 hours post-prandial: (140 goal)  - breakfast: 138, 138, 103, 124  - lunch: 138, 125  - dinner: 125, 130, 130     2 hours post-prandial: (120 goal)  - breakfast: 100, 119, 115  - lunch: 120, 127, 105  - dinner: 129, 105, 104, 111    OBJECTIVE  /71   Pulse 84   Temp 97.7  F (36.5  C)   Resp 18   Ht 1.6 m (5' 3\")   Wt 78.2 kg (172 lb 6.4 oz)   LMP 2018 (Exact Date)   SpO2 98%   BMI 30.54 kg/m      General: sitting up in a chair awake and alert. Pleasant and cooperative in NAD.  Cardio: RRR  Resp: clear vesicular breath sounds bilaterally, no increased work of breathing  Abd: gravid, non-tender  Skin: no lesions or rashes  Psych: mood good, affect congruent. Mentation and conversation appropriate.    ASSESSMENT/PLAN  Patient is a  a 24 year old  female at 37w0d corresponding to DARLENE of  Mar 29, 2019 obtained via LMP c/w first trimester US. Expecting a baby girl.  - recent delivery on 18. Baby boy, Abdoulaye  - prenatal labs unremarkable  --> O positive, hgb 13.4. BV that was treated  - anatomy US normal with good interval growth  - pregravid BMI 30, recommended weight gain 15-25lbs, TWG today ~6lb  - flu vaccination given ()  - third trimester hemoglobin 11.9 and rpr negative  - Tdap given ()  - planning on vaginal delivery  - formula feeding  - GBS obtained today  - GERD: TUMS trial, continue for now  - TBD " on contraception     GDM, starting insulin  - blood sugars much better controlled now after starting basal insulin; lantus 8 units.   - continue on metformin 500mg bid  - continue to watch diet  - currently getting weekly BPPs, this week was 10/10. Continue with weekly BPPs.  - growth US at 35w6d with percentile 31%  - as patient in on insulin, timing of delivery 39 weeks; discussed with patient.    Follow up in 1 week for GODWIN with cervical exam    Precepted with: DO Parvin Mathews MD (PGY3)  Pager: [phone]  [facility]

## 2019-03-09 LAB
ALLERGIC TO PENICILLIN: NO
GP B STREP AG SPEC QL LA: NEGATIVE

## 2019-03-12 ENCOUNTER — OFFICE VISIT (OUTPATIENT)
Dept: FAMILY MEDICINE | Facility: CLINIC | Age: 24
End: 2019-03-12
Payer: COMMERCIAL

## 2019-03-12 VITALS
RESPIRATION RATE: 16 BRPM | DIASTOLIC BLOOD PRESSURE: 71 MMHG | BODY MASS INDEX: 30.47 KG/M2 | OXYGEN SATURATION: 98 % | WEIGHT: 172 LBS | HEART RATE: 89 BPM | SYSTOLIC BLOOD PRESSURE: 114 MMHG | TEMPERATURE: 97.8 F

## 2019-03-12 DIAGNOSIS — Z34.83 ENCOUNTER FOR SUPERVISION OF OTHER NORMAL PREGNANCY IN THIRD TRIMESTER: ICD-10-CM

## 2019-03-12 DIAGNOSIS — O24.415 GESTATIONAL DIABETES MELLITUS (GDM) IN THIRD TRIMESTER CONTROLLED ON ORAL HYPOGLYCEMIC DRUG: Primary | ICD-10-CM

## 2019-03-12 NOTE — PROGRESS NOTES
Preceptor Attestation:  Patient's case reviewed and discussed with Parvin Silva MD resident and I evaluated the patient. I agree with written assessment and plan of care.  Supervising Physician:  DAQUAN GALLAGHER MD  [facility]

## 2019-03-12 NOTE — PROGRESS NOTES
SUBJECTIVE  No vaginal bleeding, loss of fluids, or cramping noted. No headaches, change in vision, RUQ pain, or extremity edema. Patient continues to feel baby move. She has been taking her pre- vitamin.    Gestational Diabetes  8 units lantus at bedtime. Taking it every night before bed.   500mg Metformin bid.     Diet: now snacking on vegetables instead of junk food/sweet foods and has noticed improved blood sugars. With the 2 out of range 2 hours post-prandials she had a dessert.     Blood sugars:  Fasting: (95 goal)  - 94, 87, 90, 76, 88     1 hours post-prandial: (140 goal)  - breakfast: 138, 129, 130  - lunch: 140  - dinner: 126, 130, 127     2 hours post-prandial: (120 goal)  - breakfast: 120  - lunch: 107, 100, 110  - dinner: 111       OBJECTIVE  /71   Pulse 89   Temp 97.8  F (36.6  C) (Oral)   Resp 16   Wt 78 kg (172 lb)   LMP 2018 (Exact Date)   SpO2 98%   BMI 30.47 kg/m      General: sitting up in a chair awake and alert. Pleasant and cooperative in NAD.  Cardio: RRR  Resp: clear vesicular breath sounds bilaterally, no increased work of breathing  Abd: gravid, non-tender  Skin: no lesions or rashes  Psych: mood good, affect congruent. Mentation and conversation appropriate.    ASSESSMENT/PLAN  Patient is a  a 24 year old  female at 37w4d corresponding to DARLENE of  Mar 29, 2019 obtained via LMP c/w first trimester US. Expecting a baby girl.  - recent delivery on 18. Baby boy, Abdoulaye  - prenatal labs unremarkable  --> O positive, hgb 13.4. BV that was treated  - anatomy US normal with good interval growth  - pregravid BMI 30, recommended weight gain 15-25lbs, TWG today 6lb  - flu vaccination given ()  - third trimester hemoglobin 11.9 and rpr negative  - Tdap given ()  - GBS negative  - planning on vaginal delivery  - formula feeding  - GERD: TUMS, ranitidine  -Depo vs nexplanon for contraception     GDM, starting insulin  - blood sugars much better controlled now  after starting basal insulin; lantus 8 units  - continue metformin 500mg bid   - continue to watch diet  - currently getting weekly BPPs, currently scheduled on Thursdays.  - growth US at 35w6d with percentile 31%  - as patient in on insulin, timing of delivery 39 weeks (3/24 evening); discussed with patient.    Precepted with: MD Parvin Delacruz MD (PGY3)  Pager: [phone]  [facility]

## 2019-03-14 DIAGNOSIS — Z34.83 ENCOUNTER FOR SUPERVISION OF OTHER NORMAL PREGNANCY IN THIRD TRIMESTER: ICD-10-CM

## 2019-03-14 DIAGNOSIS — O24.415 GESTATIONAL DIABETES MELLITUS (GDM) IN THIRD TRIMESTER CONTROLLED ON ORAL HYPOGLYCEMIC DRUG: ICD-10-CM

## 2019-03-14 NOTE — PROGRESS NOTES
I reviewed the NST during visit and agree with resident documentation of interpretation.    Td Sevilla MD  March 14, 2019  10:19 AM

## 2019-03-17 ENCOUNTER — HOSPITAL ENCOUNTER (OUTPATIENT)
Dept: OBGYN | Facility: HOSPITAL | Age: 24
Discharge: HOME | End: 2019-03-17
Attending: FAMILY MEDICINE | Admitting: FAMILY MEDICINE

## 2019-03-17 ENCOUNTER — TELEPHONE (OUTPATIENT)
Dept: FAMILY MEDICINE | Facility: CLINIC | Age: 24
End: 2019-03-17

## 2019-03-17 LAB
ABO/RH(D): NORMAL
ALBUMIN UR-MCNC: ABNORMAL MG/DL
ALT SERPL W P-5'-P-CCNC: <9 U/L (ref 0–45)
ANTIBODY SCREEN: NEGATIVE
APPEARANCE UR: CLEAR
APTT PPP: 27 SECONDS (ref 24–37)
AST SERPL W P-5'-P-CCNC: 11 U/L (ref 0–40)
BACTERIA #/AREA URNS HPF: ABNORMAL HPF
BILIRUB UR QL STRIP: NEGATIVE
COLOR UR AUTO: YELLOW
CREAT SERPL-MCNC: 0.64 MG/DL (ref 0.6–1.1)
CREAT UR-MCNC: 142.3 MG/DL
ERYTHROCYTE [DISTWIDTH] IN BLOOD BY AUTOMATED COUNT: 13.4 % (ref 11–14.5)
GFR SERPL CREATININE-BSD FRML MDRD: >60 ML/MIN/1.73M2
GLUCOSE BLDC GLUCOMTR-MCNC: 115 MG/DL (ref 70–139)
GLUCOSE UR STRIP-MCNC: NEGATIVE MG/DL
HCT VFR BLD AUTO: 34.3 % (ref 35–47)
HGB BLD-MCNC: 11.2 G/DL (ref 12–16)
HGB UR QL STRIP: NEGATIVE
INR PPP: 0.98 (ref 0.9–1.1)
KETONES UR STRIP-MCNC: NEGATIVE MG/DL
LEUKOCYTE ESTERASE UR QL STRIP: ABNORMAL
MCH RBC QN AUTO: 26.5 PG (ref 27–34)
MCHC RBC AUTO-ENTMCNC: 32.7 G/DL (ref 32–36)
MCV RBC AUTO: 81 FL (ref 80–100)
MUCOUS THREADS #/AREA URNS LPF: ABNORMAL LPF
NITRATE UR QL: NEGATIVE
PH UR STRIP: 6.5 [PH] (ref 4.5–8)
PLATELET # BLD AUTO: 285 THOU/UL (ref 140–440)
PMV BLD AUTO: 9.9 FL (ref 8.5–12.5)
PROTEIN, RANDOM URINE - HISTORICAL: 16 MG/DL
PROTEIN/CREAT RATIO, RANDOM UR: 0.11
RBC # BLD AUTO: 4.22 MILL/UL (ref 3.8–5.4)
RBC #/AREA URNS AUTO: ABNORMAL HPF
SP GR UR STRIP: 1.02 (ref 1–1.03)
SQUAMOUS #/AREA URNS AUTO: ABNORMAL LPF
URATE SERPL-MCNC: 5.3 MG/DL (ref 2–7.5)
UROBILINOGEN UR STRIP-ACNC: ABNORMAL
WBC #/AREA URNS AUTO: ABNORMAL HPF
WBC: 9.7 THOU/UL (ref 4–11)

## 2019-03-17 ASSESSMENT — MIFFLIN-ST. JEOR: SCORE: 1494.32

## 2019-03-17 NOTE — TELEPHONE ENCOUNTER
Patient is 23 yo  at 38w2d calling the on call pager.     States having pressure on top of stomach and some below.   Baby is moving.   Symptom started around when she was going to restroom.   Pressure/pain has improved in lower quadrant but still in top  No HA  No swelling  Does have some SOB from the pain  Not sure if these are contractions.     Recommend she come in to triage for ROL. Notified [facility]. Will evaluate when she arrives    Saul Rojas MD   [facility]  Pager #: [phone]

## 2019-03-18 ENCOUNTER — OFFICE VISIT (OUTPATIENT)
Dept: FAMILY MEDICINE | Facility: CLINIC | Age: 24
End: 2019-03-18
Payer: COMMERCIAL

## 2019-03-18 VITALS
OXYGEN SATURATION: 98 % | BODY MASS INDEX: 30.87 KG/M2 | TEMPERATURE: 97.4 F | DIASTOLIC BLOOD PRESSURE: 78 MMHG | SYSTOLIC BLOOD PRESSURE: 125 MMHG | WEIGHT: 174.2 LBS | HEIGHT: 63 IN | HEART RATE: 89 BPM | RESPIRATION RATE: 20 BRPM

## 2019-03-18 DIAGNOSIS — Z34.83 ENCOUNTER FOR SUPERVISION OF OTHER NORMAL PREGNANCY IN THIRD TRIMESTER: Primary | ICD-10-CM

## 2019-03-18 DIAGNOSIS — M79.18 MUSCULOSKELETAL PAIN: ICD-10-CM

## 2019-03-18 LAB
ALBUMIN SERPL BCP-MCNC: 2.5 G/DL (ref 3.5–5)
ALP SERPL-CCNC: 214 U/L (ref 45–120)
ALT SERPL W/O P-5'-P-CCNC: <9 U/L (ref 0–45)
AST SERPL-CCNC: 11 U/L (ref 0–40)
BACTERIA SPEC CULT: NO GROWTH
BILIRUB DIRECT SERPL-MCNC: 0.1 MG/DL (ref 0–0.5)
BILIRUB SERPL-MCNC: 0.2 MG/DL (ref 0–1)
PROT SERPL-MCNC: 6.2 G/DL (ref 6–8)

## 2019-03-18 RX ORDER — HYDROXYZINE PAMOATE 25 MG/1
25-50 CAPSULE ORAL EVERY 6 HOURS PRN
Qty: 90 CAPSULE | Refills: 0 | Status: SHIPPED | OUTPATIENT
Start: 2019-03-18 | End: 2019-04-09

## 2019-03-18 ASSESSMENT — MIFFLIN-ST. JEOR: SCORE: 1514.3

## 2019-03-18 ASSESSMENT — PAIN SCALES - GENERAL: PAINLEVEL: MILD PAIN (3)

## 2019-03-18 NOTE — LETTER
[facility]  [address] [address]  [phone]    2019    Re: Rashmi Oliva  [address] [address]  [phone] (home)     : 1995      To Whom It May Concern:      Rashmi Oliva was seen at the [facility] on 3/18/2019 and is not to return to work any sooner than Monday, 3/25/19, to allow for adequate recovery.   Please contact the clinic with any questions or concerns.       Sincerely,        Dr Parvin Silva MD

## 2019-03-18 NOTE — PROGRESS NOTES
Preceptor Attestation:   Patient seen, evaluated and discussed with the resident. I have verified the content of the note, which accurately reflects my assessment of the patient and the plan of care.  Supervising Physician:Ghislaine Rodriguez DO [facility]

## 2019-03-18 NOTE — PROGRESS NOTES
"SUBJECTIVE  Was having significant pain characterized as a sharp pain located in her epigastrium that feels like it is affecting her breathing. She came into [facility] last night for evaluation that was overall unremarkable (HELLP labs, monitored fetus). Given TUMS and hydroxyzine; hydroxyzine was helpful for a few hours. When she got home the pain returned and she unable to sleep; having to sleep on the floor. She woke up this morning and the pain is still there.    Still feeling baby move as much as normal. No abnormal discharge, loss of fluids, or vaginal bleeding. No contractions either.    OBJECTIVE  /78   Pulse 89   Temp 97.4  F (36.3  C)   Resp 20   Ht 1.6 m (5' 3\")   Wt 79 kg (174 lb 3.2 oz)   LMP 2018 (Exact Date)   SpO2 98%   BMI 30.86 kg/m    General: young female sitting in clinic chair  Cardio: RRR  Pulm: clear vesicular breath sounds bilaterally with no crackles or wheezing  Abd: normal FH for gestational age. Reproducible pain with palpation to a focal area just to the top of her fundus. Also reproducible when she sits up and flexes her trunk over her gravid abdomen. No rebound tenderness or involuntary guarding.    ASSESSMENT/PLAN  23yo  currently at 38w3d. Hx of GDM on long acting insulin and well controlled. Now with very focal pain in her epigastrium just superior to her fundus. Appears most c/w MSK pain. Will check gallbladder labs today as well. Trial symptom relief with hydroxyzine.  - Hepatic Profile ([facility])  - hydrOXYzine (VISTARIL) 25 MG capsule; Take 1-2 capsules (25-50 mg) by mouth every 6 hours as needed (pain)  Dispense: 90 capsule; Refill: 0    Precepted with Dr. Roel Silva MD (PGY3)  Pager: [phone]  [facility]  "

## 2019-03-19 NOTE — RESULT ENCOUNTER NOTE
Noted unremarkable hepatic profile. Elevated alkaline phosphatase but wnl for 3rd trimester pregnancy.

## 2019-03-20 ENCOUNTER — TELEPHONE (OUTPATIENT)
Dept: FAMILY MEDICINE | Facility: CLINIC | Age: 24
End: 2019-03-20

## 2019-03-20 ENCOUNTER — OFFICE VISIT (OUTPATIENT)
Dept: FAMILY MEDICINE | Facility: CLINIC | Age: 24
End: 2019-03-20
Payer: COMMERCIAL

## 2019-03-20 VITALS
BODY MASS INDEX: 30.47 KG/M2 | TEMPERATURE: 97.8 F | RESPIRATION RATE: 16 BRPM | HEART RATE: 91 BPM | SYSTOLIC BLOOD PRESSURE: 112 MMHG | WEIGHT: 172 LBS | OXYGEN SATURATION: 97 % | DIASTOLIC BLOOD PRESSURE: 75 MMHG

## 2019-03-20 DIAGNOSIS — Z34.83 ENCOUNTER FOR SUPERVISION OF OTHER NORMAL PREGNANCY IN THIRD TRIMESTER: ICD-10-CM

## 2019-03-20 DIAGNOSIS — M79.18 MUSCULOSKELETAL PAIN: ICD-10-CM

## 2019-03-20 DIAGNOSIS — O36.8130 DECREASED FETAL MOVEMENTS IN THIRD TRIMESTER, SINGLE OR UNSPECIFIED FETUS: Primary | ICD-10-CM

## 2019-03-20 RX ORDER — HYDROXYZINE PAMOATE 25 MG/1
25-50 CAPSULE ORAL EVERY 6 HOURS PRN
Qty: 90 CAPSULE | OUTPATIENT
Start: 2019-03-20

## 2019-03-20 NOTE — PROGRESS NOTES
I have reviewed the history and physical examination. I was present for key portions of the visit and agree with the assessment and plan as documented above by Dr. Silva for 24 yr old  @ 38 w3d here for prenatal care. FHT/ FH appropriate.  Current issues include 1) likely diastasis rectus - will check gallbladder labs; tx pain w/ hydroxyzine 2) GDM on insulin - induction planned at 39 wks .  Term labor precautions given.  Continue routine prenatal care.     Td Sevilla MD  2019  2:42 PM

## 2019-03-20 NOTE — PATIENT INSTRUCTIONS
Keep your appointment with Dr. Silva this Friday    You can continue to do kick counts at home. About 10 kicks per hour.     Feel free to call with further concerns.

## 2019-03-20 NOTE — TELEPHONE ENCOUNTER
Called Rashmi to discuss concerns. Rashmi stated her belly feels lighter and baby is not moving as frequently as normal. Rashmi endorses decreased movements since 0800hours today. She endorses drinking water, successful in getting a few kicks from baby, but then baby's movements decrease again. Rashmi denies any cramping, bleeding, tightening, gush of fluid. Rashmi attempted again to lie down on her side and drink water, only a few kicks from baby and then ceased. Advised Rashmi to come into clinic for NST and OB check. Rashmi stated okay with male provider for this visit, scheduled for first afternoon appointment with Dr.Rosenstein. Adal BLACKMAN

## 2019-03-20 NOTE — TELEPHONE ENCOUNTER
[facility] phone call message- general phone call:    Reason for call: Patient states she woke up this morning notice baby hasn't been so active. She states baby is usually active  And she's been monitoring her movement for about 2 hours and hasn't felt much movement. She states she noticed her stomach dropped as well and asked if that could be the reason to why baby might not be so active today. Please call and advise.     Return call needed: Yes    OK to leave a message on voice mail? Yes    Primary language: English      needed? No    Call taken on March 20, 2019 at 12:33 PM by Pam Cardenas

## 2019-03-20 NOTE — PROGRESS NOTES
Preceptor Attestation:   Patient seen, evaluated and discussed with the resident. I personally viewed the NST and agree with the interpretation documented by the resident. I have verified the content of the note, which accurately reflects my assessment of the patient and the plan of care.  Supervising Physician:Amrik Krishna MD  [facility]

## 2019-03-20 NOTE — TELEPHONE ENCOUNTER
Message to physician:HYDROXYZINE IS CURRENTLY ON BACK ORDER.PLEASE CONSIDER CHANGE TO ALTERNATIVE    Date of last visit: 3/18/2019     Date of next visit if scheduled: Visit date 03/21/2019    Last Comprehensive Metabolic Panel:  No results found for: NA, POTASSIUM, CHLORIDE, CO2, ANIONGAP, GLC, BUN, CR, GFRESTIMATED, JACOB    BP Readings from Last 3 Encounters:   03/18/19 125/78   03/12/19 114/71   03/08/19 106/71       No results found for: A1C             Please complete refill and CLOSE ENCOUNTER.  Closing the encounter signifies the refill is complete.

## 2019-03-20 NOTE — PROGRESS NOTES
[facility] Visit Note    Subjective  Chief Complaint   Patient presents with     Prenatal Care     ob care. decrease baby movement since this morning, belly feeling tight     Medication Reconciliation     complete      Rashmi Oliva is a 23 yo  female currently at 38w5d with primary OB care with Dr. Parvin Silva with pregnancy complicated by GDM who presents to clinic for concerns of decreased fetal movement.     Woke up this morning and felt like stomach dropped, felt light. Usually feels baby move when she wakes up and didn't feel it this morning. Drank some water, felt a few kicks. Then less movement through the morning. Tried to lay down to count kicks after drinking more water. Felt a few small kicks.     No contractions. No gush of fluid, no bleeding, no abnormal discharge. No nausea or vomiting. Still having epigastric pains, hydroxyzine seems to help.    Reports BG this morning of 120     Objective  /75   Pulse 91   Temp 97.8  F (36.6  C) (Oral)   Resp 16   Wt 78 kg (172 lb)   LMP 2018 (Exact Date)   SpO2 97%   BMI 30.47 kg/m       General: Appears well, NAD  CV: RRR  Pulm: Lungs CTAB, no wheezes or crackles, good air movement  Abd: Reproducible epigastric pain, no rebound or guarding.    NST: Reactive -- FHR 140s, multiple 15 x 15 accels noted    A/P  Rashmi Oliva is a 24 year old  female at 38w5d who presents today for concern of decreased fetal movement.    (O36.8130) Decreased fetal movements in third trimester, single or unspecified fetus  (primary encounter diagnosis)  Comment: NST reassuring, likely related to episodes of fetal sleep. Reviewed with mother finding.  Plan: Routine follow up. Discussed with mother to perform kick counts.     (Z34.83) Encounter for supervision of other normal pregnancy in third trimester  Comment: Planning for IOL at 39 weeks GA due to GDM  Plan: Follow up with Dr. Silva on 3/22/19 as planned      Benjamin Rosenstein, MD,  MA  [facility] [place]  P: [phone]     Precepted today with: Amrik Krishna MD

## 2019-03-21 DIAGNOSIS — Z34.83 ENCOUNTER FOR SUPERVISION OF OTHER NORMAL PREGNANCY IN THIRD TRIMESTER: ICD-10-CM

## 2019-03-21 DIAGNOSIS — Z34.83 ENCOUNTER FOR SUPERVISION OF OTHER NORMAL PREGNANCY IN THIRD TRIMESTER: Primary | ICD-10-CM

## 2019-03-21 DIAGNOSIS — O24.415 GESTATIONAL DIABETES MELLITUS (GDM) IN THIRD TRIMESTER CONTROLLED ON ORAL HYPOGLYCEMIC DRUG: ICD-10-CM

## 2019-03-21 RX ORDER — HYDROXYZINE HYDROCHLORIDE 25 MG/1
25-50 TABLET, FILM COATED ORAL EVERY 6 HOURS PRN
Qty: 90 TABLET | Refills: 0 | Status: SHIPPED | OUTPATIENT
Start: 2019-03-21 | End: 2019-04-09

## 2019-03-22 ENCOUNTER — OFFICE VISIT (OUTPATIENT)
Dept: FAMILY MEDICINE | Facility: CLINIC | Age: 24
End: 2019-03-22
Payer: COMMERCIAL

## 2019-03-22 VITALS
SYSTOLIC BLOOD PRESSURE: 118 MMHG | HEIGHT: 63 IN | OXYGEN SATURATION: 96 % | WEIGHT: 172.6 LBS | RESPIRATION RATE: 20 BRPM | HEART RATE: 90 BPM | TEMPERATURE: 98.1 F | BODY MASS INDEX: 30.58 KG/M2 | DIASTOLIC BLOOD PRESSURE: 78 MMHG

## 2019-03-22 DIAGNOSIS — Z34.83 ENCOUNTER FOR SUPERVISION OF OTHER NORMAL PREGNANCY IN THIRD TRIMESTER: ICD-10-CM

## 2019-03-22 DIAGNOSIS — O24.415 GESTATIONAL DIABETES MELLITUS (GDM) IN THIRD TRIMESTER CONTROLLED ON ORAL HYPOGLYCEMIC DRUG: Primary | ICD-10-CM

## 2019-03-22 ASSESSMENT — MIFFLIN-ST. JEOR: SCORE: 1507.04

## 2019-03-22 NOTE — PROGRESS NOTES
"SUBJECTIVE  No vaginal bleeding, loss of fluids, or cramping noted. No headaches, change in vision, RUQ pain, or extremity edema. She has been taking her pre-ginani vitamin. Pain in the epigastrium has gotten much better; taking hydroxyzine about 1x/day, most recently last night. Baby moving normally in the last 3 days.    Gestational Diabetes  8 units lantus at bedtime. Taking it every night before bed.   500mg Metformin qhs.     Diet: now snacking on vegetables instead of junk food/sweet foods and has noticed improved blood sugars. With the 1 out of range 2 hours post-prandials she had a dessert.      Blood sugars:  Fasting: (95 goal)  - 78, 89, 74, 86, 81, 78, 76, 90, 88, 74     1 hours post-prandial: (140 goal)  - breakfast: 122, 130, 138, 123  - lunch: 125, 130, 125, 127, 125, 138  - dinner: 130, 122, 140, 122, 129, 130     2 hours post-prandial: (120 goal)  - breakfast: 115, 105, 117, 105, 120  - lunch: 105  - dinner: 123, 112, 119    OBJECTIVE  /78   Pulse 90   Temp 98.1  F (36.7  C)   Resp 20   Ht 1.6 m (5' 3\")   Wt 78.3 kg (172 lb 9.6 oz)   LMP 2018 (Exact Date)   SpO2 96%   BMI 30.57 kg/m      General: sitting up in a chair awake and alert. Pleasant and cooperative in NAD.  Cardio: RRR  Resp: clear vesicular breath sounds bilaterally, no increased work of breathing  Abd: gravid, non-tender  Skin: no lesions or rashes  : /-2, soft, mid  Psych: mood good, affect congruent. Mentation and conversation appropriate.    ASSESSMENT/PLAN  Patient is a  a 24 year old  female at 39w0d corresponding to DARLENE of  Mar 29, 2019 obtained via LMP c/w first trimester US. Expecting a baby girl.  - recent delivery on 18. Baby boy, Abdoulaye  - prenatal labs unremarkable  --> O positive, hgb 13.4. BV that was treated  - anatomy US normal with good interval growth  - pregravid BMI 30, recommended weight gain 15-25lbs, TWG today 6lb  - flu vaccination given ()  - third trimester " hemoglobin 11.9 and rpr negative  - Tdap given (1/9)  - GBS negative  - planning on vaginal delivery  - formula feeding  - GERD: TUMS, ranitidine  -Depo vs nexplanon for contraception     GDM, starting insulin  - blood sugars well controlled on lantus 8 units   - continue metformin 500mg qhs  - continue to watch diet  - currently getting weekly BPPs, currently scheduled on Thursdays.  - growth US at 35w6d with percentile 31%  - induction planned on 3/24 at 0730 with pitocin    Precepted with: MD Parvin Coello MD (PGY3)  Pager: [phone]  [facility] Resident

## 2019-03-22 NOTE — PROGRESS NOTES
I have reviewed the history and physical examination. I was present for key portions of the visit and agree with the assessment and plan as documented above by Dr. Silva for 24 yr old  @ 39 wks here for prenatal care. FHT/ FH appropriate.  Current issues include 1) GDM - induction planned 3/24.  /term labor precautions given.  Continue routine prenatal care.     Td Sevilla MD  2019  10:35 AM

## 2019-04-04 ENCOUNTER — CARE COORDINATION (OUTPATIENT)
Dept: FAMILY MEDICINE | Facility: CLINIC | Age: 24
End: 2019-04-04

## 2019-04-04 DIAGNOSIS — O24.415 GESTATIONAL DIABETES MELLITUS (GDM) IN THIRD TRIMESTER CONTROLLED ON ORAL HYPOGLYCEMIC DRUG: ICD-10-CM

## 2019-04-09 ENCOUNTER — OFFICE VISIT (OUTPATIENT)
Dept: FAMILY MEDICINE | Facility: CLINIC | Age: 24
End: 2019-04-09
Payer: COMMERCIAL

## 2019-04-09 VITALS
SYSTOLIC BLOOD PRESSURE: 130 MMHG | OXYGEN SATURATION: 97 % | WEIGHT: 159 LBS | RESPIRATION RATE: 20 BRPM | BODY MASS INDEX: 28.17 KG/M2 | HEIGHT: 63 IN | DIASTOLIC BLOOD PRESSURE: 75 MMHG | TEMPERATURE: 98.1 F | HEART RATE: 76 BPM

## 2019-04-09 DIAGNOSIS — Z30.09 ENCOUNTER FOR OTHER GENERAL COUNSELING OR ADVICE ON CONTRACEPTION: Primary | ICD-10-CM

## 2019-04-09 PROBLEM — Z34.93 ENCOUNTER FOR SUPERVISION OF NORMAL PREGNANCY IN THIRD TRIMESTER: Status: RESOLVED | Noted: 2018-09-06 | Resolved: 2019-04-09

## 2019-04-09 PROBLEM — Z86.32 HISTORY OF INSULIN CONTROLLED GESTATIONAL DIABETES MELLITUS: Status: ACTIVE | Noted: 2019-04-09

## 2019-04-09 PROBLEM — O24.415 GESTATIONAL DIABETES MELLITUS (GDM) IN THIRD TRIMESTER CONTROLLED ON ORAL HYPOGLYCEMIC DRUG: Status: RESOLVED | Noted: 2019-01-30 | Resolved: 2019-04-09

## 2019-04-09 ASSESSMENT — MIFFLIN-ST. JEOR: SCORE: 1445.35

## 2019-04-09 NOTE — PROGRESS NOTES
"       HPI       Rashmi Oliva is a 24 year old female who presents for:  Chief Complaint   Patient presents with     Contraception     Nexplanon consultation     Medication Reconciliation     needs attention       Contraception conseling  - patient planning on nexplanon insertion  - we do not have this here in clinic today  - having some vaginal bleeding; overall improving, no fevers/chills, no abnormal smelling vaginal discharge    ROS: Complete 6 point ROS completed and negative other than stated above.         Physical Exam:     Vitals:    04/09/19 0958   BP: 130/75   Pulse: 76   Resp: 20   Temp: 98.1  F (36.7  C)   SpO2: 97%   Weight: 72.1 kg (159 lb)   Height: 1.6 m (5' 3\")     Body mass index is 28.17 kg/m .  Vitals were reviewed and were normal    General: Alert and orientated in NAD. Pleasant and cooperative.   Cards: RRR, no murmurs, rubs or gallops. No lower extremity edema  Resp: clear vesicular breath sounds bilaterally, no crackles or wheezes. No increased work of breathing  Psych: mood good, affect congruent    Assessment and Plan     1. Encounter for other general counseling or advice on contraception  - nexplanon insertion scheduled at this visit, no charge  - RTC if vaginal bleeding is still present at 1 month  - RTC sooner if fevers/chills, foul smelling, concerning symptoms    Options for treatment and follow-up care were reviewed with the patient. Rashmi Oliva  engaged in the decision making process and verbalized understanding of the options discussed and agreed with the final plan.    Precepted with: Dr. Paxton Silva MD (PGY3)  Pager: [phone]  [facility] Resident          "

## 2019-04-09 NOTE — PROGRESS NOTES
Preceptor Attestation:   Patient seen, evaluated and discussed with the resident. I have verified the content of the note, which accurately reflects my assessment of the patient and the plan of care.  Supervising Physician:Dashawn Matta MD  [facility]    Seen with her 2 wk/old infant. Because of scheduling issue, she will return for a separate appointment for nexplannon insertion and not be charged for a visit today.  MATEUSZ Matta

## 2019-04-09 NOTE — LETTER
RETURN TO WORK FORM    4/9/2019    Re: Rashmi Oliva  1995      To Whom It May Concern:     Rashmi Oliva was seen in clinic today. She may return to work without restrictions on 4/25/19          Restrictions:  None, full duty      Parvin Silva MD  4/9/2019 10:39 AM

## 2019-05-01 NOTE — PROGRESS NOTES
Procedure Note-Nexplanon Insertion    Rashmi Oliva is a patient of [facility] here for placement of etonogestrel implant (Nexplanon).    Indication: Contraception    Consent: Affirmation of informed consent was signed and scanned into the medical record. Risks, benefits and alternatives were discussed. Patient's questions were elicited and answered.     Labs: UPT Negative    LMP: Postpartum  3/25/19    Previous Contraception: OCPs, depo shot               Counseling:  Pt. counseled on potential side effects of Nexplanon, including unpredictable spotting/bleeding and plan for removal/replacement of Nexplanon in 3 years or less.      Preoperative Diagnosis: desires effective contraception    Postoperative Diagnosis: etonogestrel implant in place    Skin prep: Betadine    Anesthesia: 1% lidocaine    Technique:   Patient was placed supine with right arm exposed.  Gio was made 10 cm above and 2 cm below the medial epicondial.  Arm was prepped with betadine. Insertion point was anesthetized with 2 mL of 1% lidocaine. After stretching the skin with thumb and index finger around the insertion site, skin punctured with the tip of the needle inserted at 30 degrees and then lowered to horizontal position. While lifting the skin with the tip of the needle, the needle with inserted to its full length. Applicator was stabilized and purple slider was released down. Applicator was then removed. Correct placement of the implant was confirmed by palpation in the patient's arm. Insertion site was dressed with a pressure dressing.      User card and patient chart label filled out. User card given to patient. Nexplanon added to medication list.     Lot# [ [id number] ]    EBL: minimal  Complications: No    Tolerance: Pt tolerated procedure well and was in stable condition.     Follow up: Pt was instructed to call if bleeding, severe pain     Mary Lee DO  [facility] Resident  Pager #[phone]    Precepted  with: Dr. Sevilla

## 2019-05-03 ENCOUNTER — OFFICE VISIT (OUTPATIENT)
Dept: FAMILY MEDICINE | Facility: CLINIC | Age: 24
End: 2019-05-03
Payer: COMMERCIAL

## 2019-05-03 VITALS
HEART RATE: 67 BPM | RESPIRATION RATE: 16 BRPM | SYSTOLIC BLOOD PRESSURE: 109 MMHG | WEIGHT: 160 LBS | OXYGEN SATURATION: 95 % | BODY MASS INDEX: 28.34 KG/M2 | TEMPERATURE: 97.7 F | DIASTOLIC BLOOD PRESSURE: 71 MMHG

## 2019-05-03 DIAGNOSIS — Z30.017 NEXPLANON INSERTION: Primary | ICD-10-CM

## 2019-05-03 LAB — HCG UR QL: NEGATIVE

## 2019-05-14 ENCOUNTER — OFFICE VISIT (OUTPATIENT)
Dept: FAMILY MEDICINE | Facility: CLINIC | Age: 24
End: 2019-05-14
Payer: COMMERCIAL

## 2019-05-14 VITALS
SYSTOLIC BLOOD PRESSURE: 120 MMHG | OXYGEN SATURATION: 100 % | WEIGHT: 162.6 LBS | HEIGHT: 62 IN | RESPIRATION RATE: 18 BRPM | DIASTOLIC BLOOD PRESSURE: 85 MMHG | BODY MASS INDEX: 29.92 KG/M2 | HEART RATE: 73 BPM | TEMPERATURE: 98.1 F

## 2019-05-14 DIAGNOSIS — L50.9 HIVES: ICD-10-CM

## 2019-05-14 DIAGNOSIS — Z86.32 HISTORY OF INSULIN CONTROLLED GESTATIONAL DIABETES MELLITUS: ICD-10-CM

## 2019-05-14 LAB — HBA1C MFR BLD: 5.5 % (ref 4.1–5.7)

## 2019-05-14 RX ORDER — DIPHENHYDRAMINE HCL 25 MG
25-50 CAPSULE ORAL EVERY 6 HOURS PRN
Qty: 30 CAPSULE | Refills: 0 | Status: SHIPPED | OUTPATIENT
Start: 2019-05-14 | End: 2021-04-21

## 2019-05-14 RX ORDER — CETIRIZINE HYDROCHLORIDE 10 MG/1
10 TABLET ORAL DAILY
Qty: 90 TABLET | Refills: 1 | Status: SHIPPED | OUTPATIENT
Start: 2019-05-14 | End: 2021-04-21

## 2019-05-14 ASSESSMENT — PATIENT HEALTH QUESTIONNAIRE - PHQ9: SUM OF ALL RESPONSES TO PHQ QUESTIONS 1-9: 1

## 2019-05-14 ASSESSMENT — MIFFLIN-ST. JEOR: SCORE: 1439.67

## 2019-05-14 NOTE — PROGRESS NOTES
Preceptor Attestation:   Patient seen, evaluated and discussed with the resident Dr Silva. I have verified the content of the note, which accurately reflects my assessment of the patient and the plan of care.  Supervising Physician:Ildefonso Vigil MD  [facility]

## 2019-05-14 NOTE — PROGRESS NOTES
"       HPI       Rashmi Oliva is a 24 year old female who presents for:  Chief Complaint   Patient presents with     Post Partum Exam     6 week post partum     Medication Reconciliation       6 weeks post-partum  - nexplanon insertion 5/3/19  - has mild cramping since than, but overall tolerable  - she has also been having mood swings over the weekend, improved today  - formula feeding  - is back to work as a CNA  - PHQ-0 today of 1 related to appetitie   - hx of GDM on insulin    Possible allergic reaction  - ate shellfish the other day and had some epigastric discomfort followed by hives.  - no tongue or lip swelling, no difficulty with breathing  - ate shellfish again this morning with no issues  - no previous hx of allergic reaction to seafood.  - allergic to bananas  - no new detergent, soaps, wash, etc    ROS: Complete 6 point ROS completed and negative other than stated above.         Physical Exam:     Vitals:    05/14/19 1516   BP: 120/85   Pulse: 73   Resp: 18   Temp: 98.1  F (36.7  C)   TempSrc: Oral   SpO2: 100%   Weight: 73.8 kg (162 lb 9.6 oz)   Height: 1.565 m (5' 1.61\")     Body mass index is 30.11 kg/m .  Vitals were reviewed and were normal    General: Alert and orientated in NAD. Pleasant and cooperative.   Cards: RRR, no murmurs, rubs or gallops. No lower extremity edema  Resp: clear vesicular breath sounds bilaterally, no crackles or wheezes. No increased work of breathing  Skin: nexplanon in L arm, healing well. Hives developing on left forearm.  Psych: mood good, affect congruent        Results:     Results for orders placed or performed in visit on 05/14/19   Hemoglobin A1c ([facility])   Result Value Ref Range    Hemoglobin A1C 5.5 4.1 - 5.7 %         Assessment and Plan     1. Routine postpartum follow-up  Her mood is doing well. On nexplanon for contraception. Formula feeding. Continue with current cares.    2. History of insulin controlled gestational diabetes mellitus  A1c 5.5 today. " Encouraged physical activity and diet with low carbs (white rice). Will need to repeat this in 3-5 years.  - Hemoglobin A1c ([facility] FM)    3. Hives  Possible delayed allergic reaction to shellfish. No anaphylaxis symptoms.  - cetirizine (ZYRTEC) 10 MG tablet; Take 1 tablet (10 mg) by mouth daily  Dispense: 90 tablet; Refill: 1  - diphenhydrAMINE (BENADRYL) 25 MG capsule; Take 1-2 capsules (25-50 mg) by mouth every 6 hours as needed for itching or allergies  Dispense: 30 capsule; Refill: 0    Follow up in Sept 2020 for screening papsmear    Options for treatment and follow-up care were reviewed with the patient. Rashmi Oliva  engaged in the decision making process and verbalized understanding of the options discussed and agreed with the final plan.    Precepted with: MD Parvin Lam MD (PGY3)  Pager: [phone]  [facility] Resident

## 2019-05-16 NOTE — PROGRESS NOTES
I have personally reviewed the history and examination as documented by Dr. Lee.  I was present during key portions of the visit including the entire nexplanon insertion procedure and agree with the assessment and plan as documented for 24 yr old female here for nexplanon insertion. Pt tolerated procedure well. Anticipatory guidance given.     Td Sevilla MD  May 16, 2019  2:49 PM

## 2019-09-05 ENCOUNTER — OFFICE VISIT (OUTPATIENT)
Dept: FAMILY MEDICINE | Facility: CLINIC | Age: 24
End: 2019-09-05
Payer: COMMERCIAL

## 2019-09-05 VITALS
OXYGEN SATURATION: 98 % | TEMPERATURE: 98 F | DIASTOLIC BLOOD PRESSURE: 85 MMHG | SYSTOLIC BLOOD PRESSURE: 121 MMHG | WEIGHT: 173.6 LBS | HEART RATE: 76 BPM | HEIGHT: 61 IN | RESPIRATION RATE: 18 BRPM | BODY MASS INDEX: 32.77 KG/M2

## 2019-09-05 DIAGNOSIS — R07.89 OTHER CHEST PAIN: Primary | ICD-10-CM

## 2019-09-05 DIAGNOSIS — M94.0 COSTOCHONDRITIS: ICD-10-CM

## 2019-09-05 ASSESSMENT — MIFFLIN-ST. JEOR: SCORE: 1486.43
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
## 2019-09-05 NOTE — PATIENT INSTRUCTIONS
Thank you for coming today    Use the naproxyn two time a day for the next couple weeks  Take the zantac (ranitidine) twice a day as well with this    If you pain gets worse, you feel more short of breath, or have further concerns, return to clinic or go to the emergency room.

## 2020-03-02 ENCOUNTER — HEALTH MAINTENANCE LETTER (OUTPATIENT)
Age: 25
End: 2020-03-02

## 2020-05-22 DIAGNOSIS — K21.00 GASTROESOPHAGEAL REFLUX DISEASE WITH ESOPHAGITIS: Primary | ICD-10-CM

## 2020-05-22 RX ORDER — FAMOTIDINE 20 MG/1
20 TABLET, FILM COATED ORAL 2 TIMES DAILY
Qty: 60 TABLET | Refills: 3 | Status: SHIPPED | OUTPATIENT
Start: 2020-05-22 | End: 2021-05-27

## 2020-11-04 ENCOUNTER — TELEPHONE (OUTPATIENT)
Dept: FAMILY MEDICINE | Facility: CLINIC | Age: 25
End: 2020-11-04

## 2020-11-04 DIAGNOSIS — Z20.822 EXPOSURE TO 2019 NOVEL CORONAVIRUS: Primary | ICD-10-CM

## 2020-11-04 NOTE — TELEPHONE ENCOUNTER
Appropriate for testing, plan to test Friday as exposure was sometime this weekend or Monday. Adal BLACKMAN

## 2020-11-04 NOTE — TELEPHONE ENCOUNTER
[facility] phone call message- general phone call:    Reason for call: Patient states being exposed to Covid19+ at work, patient was told to self quarantine and get tested if possible.    Return call needed: Yes    OK to leave a message on voice mail? Yes    Primary language: English      needed? No    Call taken on November 4, 2020 at 10:14 AM by Gustavo Christie

## 2020-11-06 DIAGNOSIS — Z20.822 EXPOSURE TO 2019 NOVEL CORONAVIRUS: ICD-10-CM

## 2020-11-06 LAB
COVID-19 VIRUS PCR TO U OF MN - SOURCE: NORMAL
SARS-COV-2 RNA SPEC QL NAA+PROBE: NOT DETECTED

## 2020-11-06 PROCEDURE — 87635 SARS-COV-2 COVID-19 AMP PRB: CPT | Mod: 90

## 2020-11-06 PROCEDURE — 99207 PR NO BILLABLE SERVICE THIS VISIT: CPT

## 2020-12-14 ENCOUNTER — HEALTH MAINTENANCE LETTER (OUTPATIENT)
Age: 25
End: 2020-12-14

## 2021-04-09 ENCOUNTER — OFFICE VISIT (OUTPATIENT)
Dept: FAMILY MEDICINE | Facility: CLINIC | Age: 26
End: 2021-04-09
Payer: COMMERCIAL

## 2021-04-09 VITALS
TEMPERATURE: 98 F | HEIGHT: 62 IN | OXYGEN SATURATION: 97 % | WEIGHT: 194 LBS | DIASTOLIC BLOOD PRESSURE: 80 MMHG | HEART RATE: 72 BPM | RESPIRATION RATE: 16 BRPM | SYSTOLIC BLOOD PRESSURE: 125 MMHG | BODY MASS INDEX: 35.7 KG/M2

## 2021-04-09 DIAGNOSIS — Z30.46 NEXPLANON REMOVAL: Primary | ICD-10-CM

## 2021-04-09 PROCEDURE — 99207 PR NO BILLABLE SERVICE THIS VISIT: CPT | Performed by: STUDENT IN AN ORGANIZED HEALTH CARE EDUCATION/TRAINING PROGRAM

## 2021-04-09 PROCEDURE — 11982 REMOVE DRUG IMPLANT DEVICE: CPT | Mod: GC | Performed by: STUDENT IN AN ORGANIZED HEALTH CARE EDUCATION/TRAINING PROGRAM

## 2021-04-09 ASSESSMENT — MIFFLIN-ST. JEOR: SCORE: 1575.23

## 2021-04-09 NOTE — PROGRESS NOTES
Procedure Note-Nexplanon Removal    Rashmi Oliva is a patient of Amrik Nevarez here for removal of etonogestrel implant Nexplanon/Implanon.    Indication: Wants to get pregnant again    Consent: Affirmation of informed consent was signed and scanned into the medical record. Risks, benefits and alternatives were discussed. Patient's questions were elicited and answered.     Procedure safety checklist was completed:  Yes  Time Out (Pause for the Cause) completed: Yes    Preoperative Diagnosis: etonogestrel implant    Postoperative Diagnosis: etonogestrel implant removed    Technique:   Skin prep Betadine  Anesthesia 1% lidocaine  Procedure: Small incision (<5mm) was made at distal end of palpable implant, curved hemostat was used to isolate the implant and bring it to the incision, the fibrous capsule containing the implant  was incised and the Implant was removed intact. Placed 3 sutures.  EBL: minimal  Complications:  Yes, needed to increase length of initial incision due to implant being so deep.   Tolerance:  Pt tolerated procedure well and was in stable condition.     Contraception was discussed and patient chose the following method none for current time, but after having more children would like IUD.    Follow up: Pt was instructed to call if bleeding, severe pain or foul smell.     Jorge Hernandez DO, MBA  [facility] Resident    Precepted with: Ghislaine Rodriguez DO

## 2021-04-17 NOTE — PROGRESS NOTES
Preceptor Attestation:   Patient seen, evaluated and discussed with the resident. I was present for and supervised the entire procedure. I have verified the content of the note, which accurately reflects my assessment of the patient and the plan of care.  Supervising Physician:Ghislaine Rodriguez DO [facility]

## 2021-04-18 ENCOUNTER — HEALTH MAINTENANCE LETTER (OUTPATIENT)
Age: 26
End: 2021-04-18

## 2021-04-21 ENCOUNTER — OFFICE VISIT (OUTPATIENT)
Dept: FAMILY MEDICINE | Facility: CLINIC | Age: 26
End: 2021-04-21
Payer: COMMERCIAL

## 2021-04-21 VITALS
OXYGEN SATURATION: 99 % | RESPIRATION RATE: 16 BRPM | TEMPERATURE: 97.7 F | BODY MASS INDEX: 35.52 KG/M2 | DIASTOLIC BLOOD PRESSURE: 75 MMHG | WEIGHT: 193 LBS | SYSTOLIC BLOOD PRESSURE: 118 MMHG | HEART RATE: 78 BPM

## 2021-04-21 DIAGNOSIS — Z48.02 ENCOUNTER FOR REMOVAL OF SUTURES: Primary | ICD-10-CM

## 2021-04-21 PROCEDURE — 99207 PR NO CHARGE LOS: CPT | Mod: GC | Performed by: STUDENT IN AN ORGANIZED HEALTH CARE EDUCATION/TRAINING PROGRAM

## 2021-04-21 NOTE — PROGRESS NOTES
S: Patient is here today for suture removal.  The patient reports no complications with wound.  Sutures were placed 12 days ago upon Nexplanon removal.  3 sutures were placed and 1 fell out at home.  Sutures were placed at [facility]    o: Wound is well healed, no signs of secondary infection.  Sutures removed without complication.    A: Incision for Nexplanon removal, healing well    P: Sutures removed, F/U PRN.    Precepted with Dr. Amrik Luu MD  [facility] Resident

## 2021-04-21 NOTE — PROGRESS NOTES
Preceptor Attestation:   Patient seen, evaluated and discussed with the resident. I was present for and supervised the entire procedure. I have verified the content of the note, which accurately reflects my assessment of the patient and the plan of care.  Supervising Physician:Amrik Krishna MD  [facility]

## 2021-05-21 ENCOUNTER — TELEPHONE (OUTPATIENT)
Dept: FAMILY MEDICINE | Facility: CLINIC | Age: 26
End: 2021-05-21

## 2021-05-21 NOTE — TELEPHONE ENCOUNTER
Called patient to discuss, recommend patient be tested for TB if brother tests positive for TB. No answer, left VM to call clinic back. Adal BLACKMAN

## 2021-05-21 NOTE — TELEPHONE ENCOUNTER
Patient returning Nino's call, gave her info documented, she understood. Will wait for results on her brother and call back to schedule appt.

## 2021-05-21 NOTE — TELEPHONE ENCOUNTER
[facility] phone call message- general phone call:    Reason for call: Patient is calling to speak to a Nurse to see if she should be tested for TB, she states her brother was admitted yesterday to the hospital and possibly having TB at the [facility]. She states they are saying possibly TB infection and still waiting on results. If he tested Positive, would she have to get tested? Please call and advise.     Return call needed: Yes    OK to leave a message on voice mail?     Primary language: English      needed? No    Call taken on May 21, 2021 at 3:47 PM by Tony Oliva

## 2021-05-25 ENCOUNTER — COMMUNICATION - HEALTHEAST (OUTPATIENT)
Dept: SCHEDULING | Facility: CLINIC | Age: 26
End: 2021-05-25

## 2021-05-25 ENCOUNTER — HOSPITAL ENCOUNTER (EMERGENCY)
Dept: EMERGENCY MEDICINE | Facility: HOSPITAL | Age: 26
Discharge: HOME | End: 2021-05-25
Attending: STUDENT IN AN ORGANIZED HEALTH CARE EDUCATION/TRAINING PROGRAM
Payer: COMMERCIAL

## 2021-05-25 ENCOUNTER — NURSE TRIAGE (OUTPATIENT)
Dept: FAMILY MEDICINE | Facility: CLINIC | Age: 26
End: 2021-05-25

## 2021-05-25 ENCOUNTER — NURSE TRIAGE (OUTPATIENT)
Dept: NURSING | Facility: CLINIC | Age: 26
End: 2021-05-25

## 2021-05-25 DIAGNOSIS — N93.9 VAGINAL BLEEDING: ICD-10-CM

## 2021-05-25 LAB
HCG SERPL-ACNC: 3 MLU/ML (ref 0–4)
HGB BLD-MCNC: 13.5 G/DL (ref 12–16)

## 2021-05-25 ASSESSMENT — MIFFLIN-ST. JEOR: SCORE: 1514.72

## 2021-05-25 NOTE — TELEPHONE ENCOUNTER
[facility] phone call message-patient reporting a symptom:     Symptom: Vaginal bleeding    Same Day Visit Offered: No openings    Additional comments: Patient states she took a home pregnancy test and it came back positive, she states bleeding started yesterday but stopped. Patient states she woke up and was bleeding. Patient would like to speak with nurse to see what should she do. Please call and advise.     OK to leave message on voice mail? Yes    Primary language: English      needed? No    Call taken on May 25, 2021 at 8:18 AM by Pam Cardenas

## 2021-05-25 NOTE — TELEPHONE ENCOUNTER
"OB Triage Call    Reason for call: Vaginal bleeding, early pregnancy    Assessment:   Tested positive for pregnancy on   Woke up this morning with a \"lot of bleeding.\"   Bleeding similar to menstrual flow  Has nausea  No pain     (TPAL unknown)      Per protocol, advised to be seen at the clinic today.      She is a patient at the [facility] but they have no openings today.    FNA reached out to [facility] as she prefers to go to [facility]. No openings until  for new OB patients.    FNA phoned [facility] - both locations are booked. ALFREDO was advised by staff that patient can go to [facility] for early pregnancy confirmation (Beta-HCG and give her an order for ultrasound).       Plan: [facility] WIC today  If bleeding becomes worse (soaks > 1 pad per hour,) go to ED  Pt verbalized understanding.      Maryam Anna RN 21 8:40 AM  [facility] Nurse Advisor            Reason for Disposition    Patient wants to be seen    Additional Information    Negative: Shock suspected (e.g., cold/pale/clammy skin, too weak to stand, low BP, rapid pulse)    Negative: Difficult to awaken or acting confused (e.g., disoriented, slurred speech)    Negative: Passed out (i.e., fainted, collapsed and was not responding)    Negative: Sounds like a life-threatening emergency to the triager    Negative: SEVERE abdominal pain (e.g., excruciating)    Negative: SEVERE vaginal bleeding (e.g., soaking 2 pads / hour, large blood clots) and present for 2 or more hours    Negative: SEVERE dizziness (e.g., unable to stand, requires support to walk, feels like passing out)    Negative: MODERATE vaginal bleeding (e.g., soaking 1 pad) and present > 6 hours    Negative: MODERATE vaginal bleeding (e.g., soaking 1 pad / hour, clots) and pregnant > 12 weeks    Negative: Passed tissue (e.g., gray-white)    Negative: Shoulder pain    Negative: Constant abdominal pain lasting > 1 hour    Negative: Fever > " 100.4 F (38.0 C)    Negative: Pale skin (pallor) of new onset or worsening    Negative: Patient sounds very sick or weak to the triager    Negative: MODERATE vaginal bleeding (e.g., soaking 1 pad / hour; clots)    Negative: Intermittent lower abdominal pain (e.g., cramping) lasting > 24 hours    Negative: Pain or burning with passing urine (urination)    Negative: Prior history of 'ectopic pregnancy' or previous tubal surgery (e.g., tubal ligation)    Protocols used: PREGNANCY - VAGINAL BLEEDING LESS THAN 20 WEEKS EGA-A-OH

## 2021-05-25 NOTE — TELEPHONE ENCOUNTER
"  Reason for Disposition    Pregnant < 20 weeks (less than 5 months)    MODERATE vaginal bleeding (e.g., soaking 1 pad / hour; clots)    Additional Information    Negative: Shock suspected (e.g., cold/pale/clammy skin, too weak to stand, low BP, rapid pulse)    Negative: Difficult to awaken or acting confused (e.g., disoriented, slurred speech)    Negative: Passed out (i.e., fainted, collapsed and was not responding)    Negative: Sounds like a life-threatening emergency to the triager    Negative: Vaginal bleeding and pregnant > 20 weeks    Negative: Not pregnant or pregnancy status unknown    Negative: SEVERE abdominal pain (e.g., excruciating)    Negative: SEVERE vaginal bleeding (e.g., soaking 2 pads / hour, large blood clots) and present for 2 or more hours    Negative: SEVERE dizziness (e.g., unable to stand, requires support to walk, feels like passing out)    Negative: MODERATE vaginal bleeding (e.g., soaking 1 pad) and present > 6 hours    Negative: MODERATE vaginal bleeding (e.g., soaking 1 pad / hour, clots) and pregnant > 12 weeks    Negative: Passed tissue (e.g., gray-white)    Negative: Shoulder pain    Negative: Constant abdominal pain lasting > 1 hour    Negative: Fever > 100.4 F (38.0 C)    Negative: Pale skin (pallor) of new onset or worsening    Negative: Patient sounds very sick or weak to the triager    Answer Assessment - Initial Assessment Questions  1. ONSET: \"When did this bleeding start?\"        Yesterday, started again today  2. DESCRIPTION: \"Describe the bleeding that you are having.\" \"How much bleeding is there?\"     - SPOTTING: spotting, or pinkish / brownish mucous discharge; does not fill panti-liner or pad     - MILD:  less than 1 pad / hour; less than patient's usual menstrual bleeding    - MODERATE: 1-2 pads / hour; small-medium blood clots (e.g., pea, grape, small coin)     - SEVERE: soaking 2 or more pads/hour for 2 or more hours; bleeding not contained by pads or continuous red " "blood from vagina; large blood clots (e.g., golf ball, large coin)       Moderate bleeding with stringy blood clots, has a pad soaked like a normal menstrual period  3. ABDOMINAL PAIN SEVERITY: If present, ask: \"How bad is it?\"  (e.g., Scale 1-10; mild, moderate, or severe)    - MILD (1-3): doesn't interfere with normal activities, abdomen soft and not tender to touch     - MODERATE (4-7): interferes with normal activities or awakens from sleep, tender to touch     - SEVERE (8-10): excruciating pain, doubled over, unable to do any normal activities      Denies abdominal pain but endorses nausea  4. PREGNANCY: \"Do you know how many weeks or months pregnant you are?\" \"When was the first day of your last normal menstrual period?\"      Unsure LMP per patient, had nexplanon removed 04/09  5. HEMODYNAMIC STATUS: \"Are you weak or feeling lightheaded?\" If so, ask: \"Can you stand and walk normally?\"       Denies  6. OTHER SYMPTOMS: \"What other symptoms are you having with the bleeding?\" (e.g., passed tissue, vaginal discharge, fever, menstrual-type cramps)      Nauseated    Protocols used: VAGINAL BLEEDING - [id number], PREGNANCY - VAGINAL BLEEDING LESS THAN 20 WEEKS EGA-A-OH    "

## 2021-05-27 ENCOUNTER — OFFICE VISIT (OUTPATIENT)
Dept: FAMILY MEDICINE | Facility: CLINIC | Age: 26
End: 2021-05-27
Payer: COMMERCIAL

## 2021-05-27 VITALS
HEART RATE: 69 BPM | TEMPERATURE: 98 F | RESPIRATION RATE: 20 BRPM | BODY MASS INDEX: 37.19 KG/M2 | OXYGEN SATURATION: 97 % | DIASTOLIC BLOOD PRESSURE: 74 MMHG | HEIGHT: 61 IN | WEIGHT: 197 LBS | SYSTOLIC BLOOD PRESSURE: 110 MMHG

## 2021-05-27 DIAGNOSIS — N93.9 VAGINAL BLEEDING: Primary | ICD-10-CM

## 2021-05-27 LAB — B-HCG SERPL-ACNC: <2 MLU/ML (ref 0–4)

## 2021-05-27 PROCEDURE — 36415 COLL VENOUS BLD VENIPUNCTURE: CPT | Performed by: STUDENT IN AN ORGANIZED HEALTH CARE EDUCATION/TRAINING PROGRAM

## 2021-05-27 PROCEDURE — 99214 OFFICE O/P EST MOD 30 MIN: CPT | Mod: GC | Performed by: STUDENT IN AN ORGANIZED HEALTH CARE EDUCATION/TRAINING PROGRAM

## 2021-05-27 ASSESSMENT — MIFFLIN-ST. JEOR: SCORE: 1575.97
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
## 2021-05-27 NOTE — PATIENT INSTRUCTIONS
- If you are trying to conceive, we would recommend taking a daily pre- vitamin. You can buy these over-the-counter, including OneADay Prenatal or [employer] Prenatal. Any brand is okay.
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
## 2021-06-01 VITALS — HEIGHT: 63 IN | BODY MASS INDEX: 31.89 KG/M2 | WEIGHT: 180 LBS

## 2021-06-01 VITALS — WEIGHT: 165.6 LBS | BODY MASS INDEX: 30.87 KG/M2

## 2021-06-01 VITALS — WEIGHT: 181 LBS | HEIGHT: 63 IN | BODY MASS INDEX: 32.07 KG/M2

## 2021-06-02 VITALS — WEIGHT: 172 LBS | HEIGHT: 63 IN | BODY MASS INDEX: 30.48 KG/M2

## 2021-06-02 VITALS — WEIGHT: 172 LBS | BODY MASS INDEX: 30.48 KG/M2 | HEIGHT: 63 IN

## 2021-06-17 NOTE — ED PROVIDER NOTES
I am seeing this patient along with Preston Marshall DO Resident.  I, Socorro Paiz MD have reviewed the documentation, personally taken the patient's history, performed an exam and agree with the physical findings, diagnosis and management plan. I have taken a history, review of systems, physical exam, and I concur with his documentation.    HPI: Patient is a 26 y.o. female who presents for vaginal bleeding. The patient reports she had a positive pregnancy test 4 days ago. Yesterday, she developed scant vaginal bleeding, which was only present when she used the toilet. This morning, she reports greater vaginal bleeding, which soaked her sheets. The patient notes that the bleeding is not similar to a menstrual period. Additionally, she has had some nausea. Her last menstrual period was around 4/9 (1.5 months ago), after she had her nexplanon removed. The patient is trying to conceive. She has 2 children at home. Those previous pregnancies were relatively uncomplicated, although she notes she had gestational diabetes during one of the pregnancies. The patient is O+. No abdominal cramping, abdominal pain, emesis, or any other physical complaints at this time.    ROS: A ten-point ROS was performed. All pertinent positives noted in the HPI.  All other systems reviewed and are negative except as noted.    PEx:  General: Young female in NAD.  HEENT: No external signs of trauma. No scleral icterus. Oropharynx clear and moist.  Chest/Pulm: No tenderness to palpation. Lungs clear to auscultation bilaterally.  CV: Regular rate and rhythm without murmurs.  Abdomen: Soft and non-distended without tenderness to palpation.  MSK/Extremities: Actively moving all four extremities. No pedal edema.  Skin: No visible rashes  Neuro: Alert and conversant.   Psych: Behavior normal.    ED Course:  9:58 AM Received report from Resident, Preston Marshall DO.  10:02 AM I met with the patient, obtained an initial history, performed an examination and  discussed the plan.     MDM:  26 y.o. old female who presents to the ED for evaluation of vaginal bleeding.   History and physical examination as documented. Vital signs reassuring. Agree with resident's note.     26-year-old woman with vaginal bleeding who had a home positive pregnancy test.  Hemoglobin is normal, vitals stable, her quant here was less than 3.  Ultrasound with no evidence of pregnancy.  She will need close follow-up with her primary.  Has an appointment in 2 days.  Unsure if this is completed  versus menstrual period.  She is appropriate for discharge home. Comfortable with this plan and given return precautions targeted at bleeding.         1. Vaginal bleeding          Socorro Paiz MD  Emergency medicine           Socorro Paiz MD  21 [id number]

## 2021-06-17 NOTE — TELEPHONE ENCOUNTER
"OB Triage Call    Reason for call: Vaginal bleeding, early pregnancy    Assessment:   Tested positive for pregnancy on   Woke up this morning with a \"lot of bleeding.\"   Bleeding similar to menstrual flow  Has nausea  No pain     (TPAL unknown)      Per protocol, advised to be seen at the clinic today.      She is a patient at the [facility] but they have no openings today.    FNA reached out to [facility] as she prefers to go to [place]. No openings until  for new OB patients.    FNA phoned [facility] - both locations are booked. ALFREDO was advised by staff that patient can go to [facility] for early pregnancy confirmation (Beta-HCG and give her an order for ultrasound).       Plan: [facility] WIC today  If bleeding becomes worse (soaks > 1 pad per hour,) go to ED  Pt verbalized understanding.      Maryam Anna RN 21 8:40 AM  [facility] Nurse Advisor    Reason for Disposition    Patient wants to be seen    MILD vaginal bleeding (i.e., less than 1 pad / hour; less than patient's usual menstrual bleeding; not just spotting)    Additional Information    Negative: Shock suspected (e.g., cold/pale/clammy skin, too weak to stand, low BP, rapid pulse)    Negative: Difficult to awaken or acting confused (e.g., disoriented, slurred speech)    Negative: Passed out (i.e., fainted, collapsed and was not responding)    Negative: Sounds like a life-threatening emergency to the triager    Negative: SEVERE abdominal pain (e.g., excruciating)    Negative: SEVERE vaginal bleeding (e.g., soaking 2 pads / hour, large blood clots) and present for 2 or more hours    Negative: SEVERE dizziness (e.g., unable to stand, requires support to walk, feels like passing out)    Negative: MODERATE vaginal bleeding (e.g., soaking 1 pad) and present > 6 hours    Negative: MODERATE vaginal bleeding (e.g., soaking 1 pad / hour, clots) and pregnant > 12 weeks    Negative: Passed tissue (e.g., " gray-white)    Negative: Shoulder pain    Negative: Constant abdominal pain lasting > 1 hour    Negative: Fever > 100.4 F (38.0 C)    Negative: Pale skin (pallor) of new onset or worsening    Negative: Patient sounds very sick or weak to the triager    Negative: MODERATE vaginal bleeding (e.g., soaking 1 pad / hour; clots)    Negative: Intermittent lower abdominal pain (e.g., cramping) lasting > 24 hours    Negative: Pain or burning with passing urine (urination)    Negative: Prior history of 'ectopic pregnancy' or previous tubal surgery (e.g., tubal ligation)    Protocols used: PREGNANCY - VAGINAL BLEEDING LESS THAN 20 WEEKS EGA-A-OH

## 2021-06-17 NOTE — ED PROVIDER NOTES
ISocorro have reviewed the documentation, personally taken the patient's history, performed an exam and agree with the physical finds, diagnosis and management plan.    See my note.

## 2021-06-17 NOTE — PROGRESS NOTES
Pt discharged home.  Pt and significant other verbalized understanding of discharge instructions.  Patient and partner verbalized understanding of the need of treatment of both parties and protected intercourse.  Pt will follow-up with Dr. Silva on 5/4 or sooner if needed.

## 2021-06-17 NOTE — ED TRIAGE NOTES
"Pt reports vaginal bleeding overnight, took home preg test and had positive results, \"maybe only 1-2 wks along in pregnancy. Clinic made appt for Thurs with her but increased bleeding overnight. 2 living children.  "

## 2021-06-17 NOTE — ED PROVIDER NOTES
EMERGENCY DEPARTMENT ENCOUNTER      NAME: Rashmi Oliva  AGE: 26 y.o. female  YOB: 1995  MRN: [id number]  EVALUATION DATE & TIME: 2021  9:28 AM    PCP: Parvin Silva MD    ED PROVIDER: Preston Carty DO      Chief Complaint   Patient presents with     Threatened Miscarriage         FINAL IMPRESSION:  1. Vaginal bleeding          ED COURSE & MEDICAL DECISION MAKING:    Pertinent Labs & Imaging studies reviewed. (See chart for details)  26 y.o. female presents to the Emergency Department for evaluation of vaginal bleeding    9:20AM Met with patient for initial evaluation. She presents with painless vaginal bleeding of 1 days duration. She is a  female with a positive pregnancy test on 21 and LMP 21, though her positive test was questionable(light). At time of her last period she had her nexplanon removed, as she does want to have a third child. She denies any trauma to the area. Differential diagnosis includes spontaneous first trimester pregnancy loss, ectopic pregnancy, nexplanon withdrawal bleeding, endometrial hyperplasia. HCG quant, Hgb, and transvaginal US ordered.     9:57AM Hgb 13.5. HCG Quant 3.    11:09AM Transvaginal US negative for intrauterine pregnancy. Ovaries appear unremarkable and no adnexal mass identified. Trace free fluid in the cul-de-sac. Endometrial stripe 12mm. At this time results favor spontaneous pregnancy loss. Without IUP seen, possible that POC have passed, though would expect HCG to be higher. Will pursue expectant management with close outpatient follow up to possibly trend HCG. Also possible is Nexplanon withdrawal bleeding. Pt has appointment in 2 days at [facility], will continue to monitor bleeding. Pt instructed on when to reach out to clinic/return to ED with continued bleeding.     At the conclusion of the encounter I discussed the results of all of the tests and the disposition. The questions were answered. The patient or family acknowledged  understanding and was agreeable with the care plan.       30 minutes of critical care time       MEDICATIONS GIVEN IN THE EMERGENCY DEPARTMENT:  Medications - No data to display    NEW PRESCRIPTIONS STARTED AT TODAY'S ED VISIT  Current Discharge Medication List      CONTINUE these medications which have NOT CHANGED    Details   calcium, as carbonate, (TUMS) 200 mg calcium (500 mg) chewable tablet Chew 2 tablets daily.      docusate sodium (COLACE) 100 MG capsule Take 1 capsule (100 mg total) by mouth daily.  Qty: 30 capsule, Refills: 0    Associated Diagnoses: 39 weeks gestation of pregnancy      ibuprofen (ADVIL,MOTRIN) 800 MG tablet Take 1 tablet (800 mg total) by mouth every 8 (eight) hours as needed for pain.  Qty: 30 tablet, Refills: 0    Associated Diagnoses: 39 weeks gestation of pregnancy      PNV cmb#95-ferrous fumarate-FA (PRENATAL VITAMIN WITH MINERALS) 28 mg iron- 800 mcg Tab Take 1 tablet by mouth.                  =================================================================    HPI    Patient information was obtained from: patient    Use of : N/A        Rashmi Oliva is a 26 y.o. now possible  female with a pertinent history of GDM,  who presents to this ED via walk-in for evaluation of painless vaginal bleeding of 1 days duration. LMP was approximately 21. At that time she had her nexplanon removed, as she desires a 3rd child. She notes that on Friday she had a positive pregnancy test. Yesterday she noticed a small amount of blood in the toilet on one occasion, but did not see it again the rest of the day. This morning she woke up and found blood down her leg and on her sheets. She denies any pain or cramping. She did not take any new medications other than ranitidine the day before yesterday for some GERD symptoms. No previous history of miscarriages.       REVIEW OF SYSTEMS   Review of Systems   Genitourinary: Positive for vaginal pain.      Review of Systems - General ROS:  negative for - chills, fatigue or fever  Respiratory ROS: no cough, shortness of breath, or wheezing  Cardiovascular ROS: no chest pain or dyspnea on exertion  Gastrointestinal ROS: no abdominal pain, change in bowel habits, or black or bloody stools  Genito-Urinary ROS: positive for - vaginal bleeding  Musculoskeletal ROS: negative  Neurological ROS: no TIA or stroke symptoms    PAST MEDICAL HISTORY:  Past Medical History:   Diagnosis Date     Diabetes mellitus (H)     gestional       PAST SURGICAL HISTORY:  No past surgical history on file.        CURRENT MEDICATIONS:    No current facility-administered medications on file prior to encounter.      Current Outpatient Medications on File Prior to Encounter   Medication Sig     calcium, as carbonate, (TUMS) 200 mg calcium (500 mg) chewable tablet Chew 2 tablets daily.     docusate sodium (COLACE) 100 MG capsule Take 1 capsule (100 mg total) by mouth daily.     ibuprofen (ADVIL,MOTRIN) 800 MG tablet Take 1 tablet (800 mg total) by mouth every 8 (eight) hours as needed for pain.     PNV cmb#95-ferrous fumarate-FA (PRENATAL VITAMIN WITH MINERALS) 28 mg iron- 800 mcg Tab Take 1 tablet by mouth.       ALLERGIES:  Allergies   Allergen Reactions     Banana      Sulfa (Sulfonamide Antibiotics) Hives       FAMILY HISTORY:  No family history on file.    SOCIAL HISTORY:   Social History     Socioeconomic History     Marital status: Single     Spouse name: Not on file     Number of children: Not on file     Years of education: Not on file     Highest education level: Not on file   Occupational History     Not on file   Social Needs     Financial resource strain: Not on file     Food insecurity     Worry: Not on file     Inability: Not on file     Transportation needs     Medical: Not on file     Non-medical: Not on file   Tobacco Use     Smoking status: Former Smoker     Quit date: 2018     Years since quittin.9     Smokeless tobacco: Never Used   Substance and Sexual  "Activity     Alcohol use: No     Drug use: No     Sexual activity: Yes     Partners: Male   Lifestyle     Physical activity     Days per week: Not on file     Minutes per session: Not on file     Stress: Not on file   Relationships     Social connections     Talks on phone: Not on file     Gets together: Not on file     Attends Church service: Not on file     Active member of club or organization: Not on file     Attends meetings of clubs or organizations: Not on file     Relationship status: Not on file     Intimate partner violence     Fear of current or ex partner: Not on file     Emotionally abused: Not on file     Physically abused: Not on file     Forced sexual activity: Not on file   Other Topics Concern     Not on file   Social History Narrative     Not on file       VITALS:  Patient Vitals for the past 24 hrs:   BP Temp Pulse Resp SpO2 Height Weight   05/25/21 0920 (!) 136/93 99.1  F (37.3  C) 83 16 95 % 5' 2\" (1.575 m) 180 lb (81.6 kg)       PHYSICAL EXAM    Constitutional: Tearful at times, well developed, Well nourished, NAD  HENT: Normocephalic, Atraumatic, Bilateral external ears normal  Neck-  Normal range of motion No stridor.   Eyes: EOMI, Conjunctiva normal, No discharge.   Respiratory: Normal breath sounds, No respiratory distress, No wheezing, Speaks full sentences easily. No cough.   Cardiovascular: Normal heart rate, Regular rhythm,  No murmurs, No rubs, No gallops.  GI: Very mild tenderness to palpation over left lower quadrant. No excessive obesity. Bowel sounds normal, Soft, No masses, No flank tenderness. No rebound or guarding.  Musculoskeletal: No edema. No cyanosis, No clubbing. Good range of motion in all major joints. No tenderness to palpation or major deformities noted.   Integument: Warm, Dry, No erythema, No rash. No petechiae.  Neurologic: Alert & oriented x 3, Normal motor function, Normal sensory function, No focal deficits noted. Normal gait.   Psychiatric: Affect " appropiate, Judgment normal. Cooperative.     LAB:  All pertinent labs reviewed and interpreted.  Results for orders placed or performed during the hospital encounter of 05/25/21   Hemoglobin   Result Value Ref Range    Hemoglobin 13.5 12.0 - 16.0 g/dL   HCG, Quant   Result Value Ref Range    Beta-hCG, Quantitative 3 0 - 4 mlU/mL       RADIOLOGY:  Reviewed all pertinent imaging. Please see official radiology report.  Us Ob < 14 Weeks With Transvaginal    Result Date: 5/25/2021  EXAM: US OB < 14 WEEKS WITH TRANSVAGINAL LOCATION: [facility] DATE/TIME: 5/25/2021 10:48 AM INDICATION: Vaginal bleeding, positive UPT, unknown dates COMPARISON: None. TECHNIQUE: Transabdominal scans were performed. Endovaginal ultrasound was performed to better visualize the embryo. FINDINGS: UTERUS: An intrauterine pregnancy is not seen. Endometrial stripe is 12 mm. There is a trace free fluid in the cul-de-sac RIGHT OVARY: Normal. LEFT OVARY: Normal.     1.  An intrauterine pregnancy is not seen. The ovaries appear unremarkable no adnexal mass is identified. There is a trace free fluid in the cul-de-sac       EKG:    N/A    Procedures  N/A    Preston Carty DO  [facility] Family Medicine Resident PGY-1  Pager: [phone]    Precepted with Dr. Socorro Paiz MD  Emergency Medicine  [facility] EMERGENCY DEPARTMENT  [address]  [facility] [address]  Dept: [phone]  Loc: [phone]       Preston Carty DO  Resident  05/25/21 1142       Socorro Paiz MD  05/25/21 0298

## 2021-06-25 NOTE — PROGRESS NOTES
[facility] with [facility] Significant other smoking with THC odor. Patient will need a UTOX per protocol with admission for history of past smoker and partner with substance use.

## 2021-06-25 NOTE — PROGRESS NOTES
[place] [facility] [place] her SO, with epigastric pain rating 4/10. She says she needs to lay back or she can get shortness of breath, not shortness of breath now. Is not isabella, does not feel contractions. DTR WNL, VSS WNL, denies headache. Category 1 tracing up to this point. Dr. Parvin Silva is off call, Dr. Rojas covering, he was notified of patients arrival. New orders for HELLP panel.

## 2021-06-25 NOTE — PROGRESS NOTES
Faculty Supervision of Residents   I have examined this patient and the medical care has been evaluated and discussed with the resident.  I am agreement with resident documentation on 3/17/2019 which reflects our discussion and decision making.     Marilou Benítez MD

## 2021-06-25 NOTE — PROGRESS NOTES
Dr. Benítez discussed test results with patient. Patient reports feeling better after TUMS and Vistaril. Plan of care to discharge home with significant other. Patient verbalizes understanding and is agreeable with plan of care.

## 2021-06-25 NOTE — PROGRESS NOTES
OBSTETRICS TRIAGE ASSESSMENT NOTE  Rashmi Oliva is a 24 y.o.  at 38w2d gestation based on dating ultrasound who has presented to maternity care for further evaluation of epigastric pain. No bleeding, leakage of fluids, contractions. Baby is moving normally.     From my telephone encounter earlier today:  States having pressure on top of stomach and some below.   Baby is moving.   Symptom started around when she was going to restroom.   Pressure/pain has improved in lower quadrant but still in top  No HA  No swelling  Does have some SOB from the pain  Not sure if these are contractions.     Was sent in for rule out labor. Pain much better when lying back in bed. Was not associated with foods.     PRENATAL CARE  Seen by Dr. Parvin Silva at [facility].    OB History      Para Term  AB Living    3 1 1   1 1    SAB TAB Ectopic Multiple Live Births    1     0 1          PAST MEDICAL HISTORY  History reviewed. No pertinent past medical history.    PAST SURGICAL HISTORY   History reviewed. No pertinent surgical history.    MEDICATIONS    Current Facility-Administered Medications:      calcium (as carbonate) chewable tablet 200 mg (TUMS), 200 mg, Oral, BID, Saul Rojas MD     hydrOXYzine pamoate capsule 25-50 mg (VISTARIL), 25-50 mg, Oral, Q6H PRN, Saul Rojas MD    SOCIAL HISTORY:   Social History     Socioeconomic History     Marital status: Single     Spouse name: Not on file     Number of children: Not on file     Years of education: Not on file     Highest education level: Not on file   Occupational History     Not on file   Social Needs     Financial resource strain: Not on file     Food insecurity:     Worry: Not on file     Inability: Not on file     Transportation needs:     Medical: Not on file     Non-medical: Not on file   Tobacco Use     Smoking status: Former Smoker     Smokeless tobacco: Never Used   Substance and Sexual Activity     Alcohol use: No     Drug use: No      "Sexual activity: Yes     Partners: Male   Lifestyle     Physical activity:     Days per week: Not on file     Minutes per session: Not on file     Stress: Not on file   Relationships     Social connections:     Talks on phone: Not on file     Gets together: Not on file     Attends Anabaptist service: Not on file     Active member of club or organization: Not on file     Attends meetings of clubs or organizations: Not on file     Relationship status: Not on file     Intimate partner violence:     Fear of current or ex partner: Not on file     Emotionally abused: Not on file     Physically abused: Not on file     Forced sexual activity: Not on file   Other Topics Concern     Not on file   Social History Narrative     Not on file       PHYSICAL EXAMINATION   /77 (Patient Position: Semi-evans, Cuff Size: Adult Small)   Pulse 77   Temp 98.5  F (36.9  C) (Oral)   Resp 26   Ht 5' 3\" (1.6 m)   Wt 172 lb (78 kg)   LMP 06/22/2018   SpO2 98%   BMI 30.47 kg/m    Gen: appears comfortable lying back in bed  CV: well perfused  Lungs: breathing comfortable on room air  Abdomen: Gravid, reproducible epigastric tenderness, no RUQ tenderness  Cervix: Dilatation (cm): 4cm/Effacement (%): 50%/   Extremities: no lower extremity edema    FETAL HEART MONITORING   Category 1 strip    CONTRACTIONS  None    LAB RESULTS  Personally reviewed.  Recent Results (from the past 24 hour(s))   POCT Glucose    Collection Time: 03/17/19 12:52 PM   Result Value Ref Range    Glucose 115 70 - 139 mg/dL   ALT (current @ALT(SGPT)    Collection Time: 03/17/19  1:13 PM   Result Value Ref Range    ALT <9 0 - 45 U/L   AST (current @AST(SGOT)    Collection Time: 03/17/19  1:13 PM   Result Value Ref Range    AST 11 0 - 40 U/L   INR    Collection Time: 03/17/19  1:13 PM   Result Value Ref Range    INR 0.98 0.90 - 1.10   APTT(PTT)    Collection Time: 03/17/19  1:13 PM   Result Value Ref Range    PTT 27 24 - 37 seconds   HM2(CBC w/o Differential)    " Collection Time: 19  1:13 PM   Result Value Ref Range    WBC 9.7 4.0 - 11.0 thou/uL    RBC 4.22 3.80 - 5.40 mill/uL    Hemoglobin 11.2 (L) 12.0 - 16.0 g/dL    Hematocrit 34.3 (L) 35.0 - 47.0 %    MCV 81 80 - 100 fL    MCH 26.5 (L) 27.0 - 34.0 pg    MCHC 32.7 32.0 - 36.0 g/dL    RDW 13.4 11.0 - 14.5 %    Platelets 285 140 - 440 thou/uL    MPV 9.9 8.5 - 12.5 fL   Creatinine    Collection Time: 19  1:13 PM   Result Value Ref Range    Creatinine 0.64 0.60 - 1.10 mg/dL    GFR MDRD Af Amer >60 >60 mL/min/1.73m2    GFR MDRD Non Af Amer >60 >60 mL/min/1.73m2   Uric Acid    Collection Time: 19  1:13 PM   Result Value Ref Range    Uric Acid 5.3 2.0 - 7.5 mg/dL       ASSESSMENT/PLAN:   24 y.o.  at 38w0d gestation presenting to labor & delivery for epigastric pain. No HA, vision changes or other concerning features. Will get HELLP labs to rule out. Most consistent with GERD vs MSK - will trial TUMS. Will also trial hydroxyzine for her anxiety. Do not think this is consistent with gallbladder source. Strip looks great - no need for continuous monitoring.     Saul Rojas MD   [facility] Medicine Resident    Precepted patient with Dr. Benítez.    This chart is completed utilizing dictation software; typos and/or incorrect word substitutions may unintentionally occur.

## 2021-06-26 NOTE — PROGRESS NOTES
Progress Notes by Abel Stockton DO at 8/24/2018  4:50 PM     Author: Abel Stockton DO Service: -- Author Type: Physician    Filed: 8/25/2018 10:25 AM Encounter Date: 8/24/2018 Status: Signed    : Abel Stockton DO (Physician)       Chief Complaint   Patient presents with   ? Urinary Tract Infection     Possibly, Burning with urination frequency but its not a lot. x2 days        History of Present Illness: Rooming staff notes reviewed.  Chief concern of patient is that she has a possible urinary tract infection.  She has increased discomfort with urination, and increased urinary frequency.  Vital signs reviewed.  Patient is in no acute appearing distress.  Breathing appears nonlabored.  Patient is alert and oriented ×3.No recent fever but she has felt some chills. No new back pain.      Review of systems: See history of present illness, otherwise negative.     Current Outpatient Prescriptions   Medication Sig Dispense Refill   ? PNV cmb#95-ferrous fumarate-FA (PRENATAL VITAMIN WITH MINERALS) 28 mg iron- 800 mcg Tab Take 1 tablet by mouth.     ? pyridoxine, vitamin B6, (B-6) 25 MG tablet Take 25 mg by mouth daily.     ? cephalexin (KEFLEX) 500 MG capsule Take 1 capsule (500 mg total) by mouth 2 (two) times a day for 7 days. 14 capsule 0   ? docusate sodium (COLACE) 100 MG capsule Take 1 capsule (100 mg total) by mouth daily. 30 capsule 0   ? ibuprofen (ADVIL,MOTRIN) 800 MG tablet Take 1 tablet (800 mg total) by mouth every 8 (eight) hours as needed for pain. 40 tablet 0   ? nipple ointment all purpose - [facility] Apply topically as needed for irritation (Apply to nipples sparingly after feedings. Do not wash or wipe off.). Apply to nipples sparingly after each feeding. Do not wash or wipe off. 30.6 g 0   ? pantoprazole (PROTONIX) 20 MG tablet Take 1 tablet (20 mg total) by mouth daily. 20 tablet 0   ? phenazopyridine (PYRIDIUM) 200 MG tablet Take 1 tablet (200 mg total) by mouth 3 (three) times a  day as needed for pain. 6 tablet 0     No current facility-administered medications for this visit.      No past medical history on file.   No past surgical history on file.   Social History   Substance Use Topics   ? Smoking status: Former Smoker   ? Smokeless tobacco: Never Used   ? Alcohol use No        No family history on file.    Vitals:    08/24/18 1707   BP: 100/60   Patient Site: Right Arm   Patient Position: Sitting   Cuff Size: Adult Regular   Pulse: (!) 59   Temp: 98  F (36.7  C)   TempSrc: Oral   SpO2: 99%   Weight: 165 lb 9.6 oz (75.1 kg)       EXAM:   General: Vital signs reviewed.  Patient is in no acute appearing distress.  Breathing appears nonlabored.  Patient is alert and oriented ×3.    Back: No areas of tenderness.    Recent Results (from the past 24 hour(s))   Urinalysis-UC if Indicated   Result Value Ref Range    Color, UA Yellow Colorless, Yellow, Straw, Light Yellow    Clarity, UA Clear Clear    Glucose, UA Negative Negative    Bilirubin, UA Negative Negative    Ketones, UA Trace (!) Negative    Specific Gravity, UA 1.025 1.005 - 1.030    Blood, UA Negative Negative    pH, UA 6.5 5.0 - 8.0    Protein, UA Trace (!) Negative mg/dL    Urobilinogen, UA 0.2 E.U./dL 0.2 E.U./dL, 1.0 E.U./dL    Nitrite, UA Negative Negative    Leukocytes, UA Small (!) Negative    Bacteria, UA None Seen None Seen hpf    RBC, UA None Seen None Seen, 0-2 hpf    WBC, UA 10-25 (!) None Seen, 0-5 hpf    Squam Epithel, UA 5-10 (!) None Seen, 0-5 lpf   Results from exam reviewed with patient.  The urinalysis showed increased leukocyte esterase and white blood cells suggestive of a urinary tract infection.    I spoke to patient about her urinary discomfort, and use Pyridium.  It is category B, similar to cephalexin I told her, and should be safe for her to use during pregnancy.  She was agreeable with being prescribed both medications.    Assessment/Plan   1. Burning with urination  Urinalysis-UC if Indicated    Culture,  Urine    phenazopyridine (PYRIDIUM) 200 MG tablet   2. Urinary tract infection  cephalexin (KEFLEX) 500 MG capsule    phenazopyridine (PYRIDIUM) 200 MG tablet       Patient Instructions   Also see info below. Be seen again in 2-3 days if symptoms are not better, sooner if feeling any worse. We will notify you of the urine culture results when available, and treat appropriately. Even if the culture is negative, finish the antibiotic treatment if you are feeling better.      Urinary Tract Infections in Women    Urinary tract infections (UTIs) are most often caused by bacteria. These bacteria enter the urinary tract. The bacteria may come from outside the body. Or they may travel from the skin outside the rectum or vagina into the urethra. Female anatomy makes it easy for bacteria from the bowel to enter a womans urinary tract, which is the most common source of UTI. This means women develop UTIs more often than men. Pain in or around the urinary tract is a common UTI symptom. But the only way to know for sure if you have a UTI for the healthcare provider to test your urine. The two tests that may be done are the urinalysis and urine culture.  Types of UTIs    Cystitis. A bladder infection (cystitis) is the most common UTI in women. You may have urgent or frequent urination. You may also have pain, burning when you urinate, and bloody urine.    Urethritis. This is an inflamed urethra, which is the tube that carries urine from the bladder to outside the body. You may have lower stomach or back pain. You may also have urgent or frequent urination.    Pyelonephritis. This is a kidney infection. If not treated, it can be serious and damage your kidneys. In severe cases, you may need to stay in the hospital. You may have a fever and lower back pain.  Medicines to treat a UTI  Most UTIs are treated with antibiotics. These kill the bacteria. The length of time you need to take them depends on the type of infection. It may be  as short as 3 days. If you have repeated UTIs, you may need a low-dose antibiotic for several months. Take antibiotics exactly as directed. Dont stop taking them until all of the medicine is gone. If you stop taking the antibiotic too soon, the infection may not go away. You may also develop a resistance to the antibiotic. This can make it much harder to treat.  Lifestyle changes to treat and prevent UTIs  The lifestyle changes below will help get rid of your UTI. They may also help prevent future UTIs.    Drink plenty of fluids. This includes water, juice, or other caffeine-free drinks. Fluids help flush bacteria out of your body.    Empty your bladder. Always empty your bladder when you feel the urge to urinate. And always urinate before going to sleep. Urine that stays in your bladder can lead to infection. Try to urinate before and after sex as well.    Practice good personal hygiene. Wipe yourself from front to back after using the toilet. This helps keep bacteria from getting into the urethra.    Use condoms during sex. These help prevent UTIs caused by sexually transmitted bacteria. Also don't use spermicides during sex. These can increase the risk for UTIs. Choose other forms of birth control instead. For women who tend to get UTIs after sex, a low-dose of a preventive antibiotic may be used. Be sure to discuss this option with your healthcare provider.    Follow up with your healthcare provider as directed. He or she may test to make sure the infection has cleared. If needed, more treatment may be started.  Date Last Reviewed: 1/1/2017 2000-2017 [employer]. [address] [place] [address], [place] [address]. All rights reserved. This information is not intended as a substitute for professional medical care. Always follow your healthcare professional's instructions.           Able Stockton, DO

## 2021-07-06 VITALS — BODY MASS INDEX: 33.13 KG/M2 | HEIGHT: 62 IN | WEIGHT: 180 LBS

## 2021-08-17 ENCOUNTER — TELEPHONE (OUTPATIENT)
Dept: FAMILY MEDICINE | Facility: CLINIC | Age: 26
End: 2021-08-17

## 2021-08-17 NOTE — TELEPHONE ENCOUNTER
[facility] phone call message- general phone call:    Reason for call: Patient called stating her  tested positive for covid Nicolas 8/15/21, patient denies no symptoms and would like to be tested today if possible. Please call and advise,.     Return call needed: Yes    OK to leave a message on voice mail? Yes    Primary language: English      needed? No    Call taken on August 17, 2021 at 8:12 AM by Nicole Oliva

## 2021-08-17 NOTE — TELEPHONE ENCOUNTER
Spoke with Rashmi, reviewed exposure risks. Recommend continued self isolation and monitor for symptoms. Best to complete testing on day 5-7 from last exposure for reliable result. Rashmi verbalized understanding, will continue to monitor for symptoms, if experience prior to day 5 will call clinic back and schedule an appointment with provider. No available schedule for Friday, 8/20/2021 at clinic on RN schedule. Rashmi will plan to go to other testing site. Ramiro BLACKMAN

## 2021-09-25 ENCOUNTER — TELEPHONE (OUTPATIENT)
Dept: FAMILY MEDICINE | Facility: CLINIC | Age: 26
End: 2021-09-25

## 2021-09-26 NOTE — TELEPHONE ENCOUNTER
Answered clinic page about patient having blood in her urine. Patient states she is about 5 weeks pregnant, she noted blood when she urinated. She is concerned she may be having a miscarriage. She thinks the blood came from the vaginal canal. There was blood in the toilet and then she continued to have some spotting. She denies any abdominal pain or fevers. Discussed with patient that the bleeding could be a sign of a possible miscarriage, however I cannot say for certain where the bleeding is coming from or what is the cause. Patient vocalized understanding to this. Instructed her to continue to monitor for further bleeding or passing of larger clots. Gave her guidance to go the ED if she develops a fever or severe abdominal pain. Advised her to make a clinic appointment on Monday to follow-up.    Dr. Tarah Yu MD

## 2021-09-27 ENCOUNTER — OFFICE VISIT (OUTPATIENT)
Dept: FAMILY MEDICINE | Facility: CLINIC | Age: 26
End: 2021-09-27
Payer: COMMERCIAL

## 2021-09-27 VITALS
HEART RATE: 79 BPM | HEIGHT: 63 IN | TEMPERATURE: 98.3 F | DIASTOLIC BLOOD PRESSURE: 84 MMHG | SYSTOLIC BLOOD PRESSURE: 136 MMHG | WEIGHT: 198 LBS | OXYGEN SATURATION: 97 % | BODY MASS INDEX: 35.08 KG/M2

## 2021-09-27 DIAGNOSIS — E66.1 CLASS 2 DRUG-INDUCED OBESITY WITHOUT SERIOUS COMORBIDITY WITH BODY MASS INDEX (BMI) OF 35.0 TO 35.9 IN ADULT: ICD-10-CM

## 2021-09-27 DIAGNOSIS — F10.90 HEAVY ALCOHOL USE: ICD-10-CM

## 2021-09-27 DIAGNOSIS — Z87.891 PERSONAL HISTORY OF TOBACCO USE, PRESENTING HAZARDS TO HEALTH: ICD-10-CM

## 2021-09-27 DIAGNOSIS — N93.9 VAGINAL BLEEDING: ICD-10-CM

## 2021-09-27 DIAGNOSIS — Z32.00 PREGNANCY EXAMINATION OR TEST, PREGNANCY UNCONFIRMED: Primary | ICD-10-CM

## 2021-09-27 DIAGNOSIS — E66.812 CLASS 2 DRUG-INDUCED OBESITY WITHOUT SERIOUS COMORBIDITY WITH BODY MASS INDEX (BMI) OF 35.0 TO 35.9 IN ADULT: ICD-10-CM

## 2021-09-27 LAB
HCG SERPL-ACNC: 6 MLU/ML (ref 0–4)
HCG UR QL: NEGATIVE
HGB BLD-MCNC: 14 G/DL (ref 11.7–15.7)

## 2021-09-27 PROCEDURE — 81025 URINE PREGNANCY TEST: CPT | Performed by: STUDENT IN AN ORGANIZED HEALTH CARE EDUCATION/TRAINING PROGRAM

## 2021-09-27 PROCEDURE — 84702 CHORIONIC GONADOTROPIN TEST: CPT | Performed by: STUDENT IN AN ORGANIZED HEALTH CARE EDUCATION/TRAINING PROGRAM

## 2021-09-27 PROCEDURE — 99214 OFFICE O/P EST MOD 30 MIN: CPT | Mod: GC | Performed by: STUDENT IN AN ORGANIZED HEALTH CARE EDUCATION/TRAINING PROGRAM

## 2021-09-27 PROCEDURE — 36415 COLL VENOUS BLD VENIPUNCTURE: CPT | Performed by: STUDENT IN AN ORGANIZED HEALTH CARE EDUCATION/TRAINING PROGRAM

## 2021-09-27 PROCEDURE — 85018 HEMOGLOBIN: CPT | Performed by: STUDENT IN AN ORGANIZED HEALTH CARE EDUCATION/TRAINING PROGRAM

## 2021-09-27 ASSESSMENT — MIFFLIN-ST. JEOR: SCORE: 1605.86
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
## 2021-09-28 RX ORDER — PRENATAL VIT/IRON FUM/FOLIC AC 27MG-0.8MG
1 TABLET ORAL DAILY
Qty: 90 TABLET | Refills: 3 | Status: SHIPPED | OUTPATIENT
Start: 2021-09-28 | End: 2022-11-29

## 2021-10-02 ENCOUNTER — HEALTH MAINTENANCE LETTER (OUTPATIENT)
Age: 26
End: 2021-10-02

## 2021-10-03 NOTE — RESULT ENCOUNTER NOTE
"Team, I called Rashmi multiple times throughout the week but was unable to reach her. Please call her to discuss:     Rashmi,     Thank you for your recent visit. I have attempted to get in touch with you but have been unable to reach you to discuss your recent results so I am having my staff call you.     Both your urine and blood test came back \"negative\" meaning that you are not pregnant at this time. I know you had been trying for pregnancy, and so I understand this might be hard news. I suspect that the bleeding you had experienced was consistent with your usual period.     However, there are steps we can take to help facilitate a pregnancy for you. We had talked about you continuing to take your prenatal vitamin. We had also talked about a number of other steps.     Please let my staff know if you would like to discuss further with me. If so, please be prepared to answer your phone or let me know how you would like me to get in touch with you.     Zuleyka Villa, DO   [facility] Resident   Pager#[phone]        "

## 2021-10-05 ENCOUNTER — TELEPHONE (OUTPATIENT)
Dept: FAMILY MEDICINE | Facility: CLINIC | Age: 26
End: 2021-10-05

## 2021-10-05 NOTE — TELEPHONE ENCOUNTER
The pt called back in and I was able to verbally relay 's message to her. I asked about her availability this week in order to coordinate a time for the pt and  to speak together. However, the pt stated she has a full work schedule working from Mon-Sat now and hardly has any available time to talk. Please advise if an appt would be more appropriate to discuss things further in person than over the phone as pt wont have any time to do so due to work. Thank you.

## 2021-10-06 NOTE — PROGRESS NOTES
Faculty Supervision of Residents   I have examined this patient and the medical care has been evaluated and discussed with the resident. See resident note outlining our discussion.      Lety Desai MD

## 2021-10-11 NOTE — CONFIDENTIAL NOTE
Team,     Please notify Rashmi we do not have to have a repeat visit if she is too busy. However, I am happy to have another visit if she is unclear on anything we spoke about at her appointment or if she would like to go over anything else.     Thanks,    Zuleyka Rodas DO   [facility] Resident   Pager#[phone]

## 2021-10-19 NOTE — TELEPHONE ENCOUNTER
Attempt #1: I called and left a voicemail for the patient to return my call. Should the patient call back please inform them of Dr. Rodas's message for her.    William Zavala, EMT  October 19, 2021  11:55 AM

## 2022-01-27 NOTE — PROGRESS NOTES
RRT Called at 2124    For increased work of breathing and CP 7/10.    SUBJECTIVE  No vaginal bleeding, loss of fluids, or cramping noted. No headaches, change in vision, RUQ pain, or extremity edema. Patient continues to feel baby move. She has been able to tolerate a diet with no significant nausea/vomitting. She has been taking her pre-gianni vitamin.    OBJECTIVE  /83  Pulse 82  Temp 97.9  F (36.6  C) (Oral)  Wt 179 lb 3.2 oz (81.3 kg)  LMP 2017  SpO2 97%  BMI 33.4 kg/m2    General: sitting up in a chair awake and alert. Pleasant and cooperative in NAD.  Cardio: RRR  Resp: clear vesicular breath sounds bilaterally, no increased work of breathing  Abd: gravid, non-tender  Skin: no lesions or rashes  Psych: mood good, affect congruent. Mentation and conversation appropriate.    ASSESSMENT/PLAN  Patient is a  a 23 year old  female at 38w1d corresponding to DARLENE of  May 3, 2018 obtained via first trimester ultrasound. Expecting baby boy.  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 Initial difficulty with N/V and weight gain. Enrolled in [employer] and they are also helping her with foods. No longer taking unisom or pyridoxine. Tolerating diet well w/o N/V. Pregravid BMI 30.5 (11-20 TWG goal).  - reflux: TUMS prescribed . Continues to have symptoms. Ranitidine prescribed . Much improved.  - flu vaccine   - Tdap vaccine   - Failed 1 hour GTT, passed 3 hour GTT (3/4)  - 3rd trimester RPR negative  - hgb 12.0  - GBS negative 4/3  - planning on vaginal delivery  - breast feeding  - contraception: considering OCPs    Precepted with: MD Parvin Mackenzie MD (PGY2)  Pager: [phone]  [facility] Resident

## 2022-03-18 ENCOUNTER — OFFICE VISIT (OUTPATIENT)
Dept: FAMILY MEDICINE | Facility: CLINIC | Age: 27
End: 2022-03-18
Payer: COMMERCIAL

## 2022-03-18 VITALS
HEART RATE: 74 BPM | BODY MASS INDEX: 36.58 KG/M2 | TEMPERATURE: 97.8 F | DIASTOLIC BLOOD PRESSURE: 83 MMHG | RESPIRATION RATE: 20 BRPM | OXYGEN SATURATION: 97 % | SYSTOLIC BLOOD PRESSURE: 123 MMHG | WEIGHT: 198.8 LBS | HEIGHT: 62 IN

## 2022-03-18 DIAGNOSIS — Z32.00 PREGNANCY EXAMINATION OR TEST, PREGNANCY UNCONFIRMED: Primary | ICD-10-CM

## 2022-03-18 LAB — HCG UR QL: POSITIVE

## 2022-03-18 PROCEDURE — 81025 URINE PREGNANCY TEST: CPT | Performed by: STUDENT IN AN ORGANIZED HEALTH CARE EDUCATION/TRAINING PROGRAM

## 2022-03-18 PROCEDURE — 99214 OFFICE O/P EST MOD 30 MIN: CPT | Performed by: STUDENT IN AN ORGANIZED HEALTH CARE EDUCATION/TRAINING PROGRAM
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
## 2022-03-31 ENCOUNTER — ANCILLARY PROCEDURE (OUTPATIENT)
Dept: ULTRASOUND IMAGING | Facility: CLINIC | Age: 27
End: 2022-03-31
Attending: STUDENT IN AN ORGANIZED HEALTH CARE EDUCATION/TRAINING PROGRAM
Payer: COMMERCIAL

## 2022-03-31 DIAGNOSIS — Z32.00 PREGNANCY EXAMINATION OR TEST, PREGNANCY UNCONFIRMED: ICD-10-CM

## 2022-03-31 PROCEDURE — 76801 OB US < 14 WKS SINGLE FETUS: CPT | Mod: TC | Performed by: RADIOLOGY

## 2022-03-31 PROCEDURE — 76817 TRANSVAGINAL US OBSTETRIC: CPT | Mod: TC | Performed by: RADIOLOGY

## 2022-03-31 NOTE — RESULT ENCOUNTER NOTE
Dear Rashmi,    Your ultrasound showed that your due date is a little different than what we originally thought. Your new due date is November 20th, 2022. Let us know if you have questions!    Sincerely,  Dr. Marilou Benítez

## 2022-04-01 ENCOUNTER — NURSE TRIAGE (OUTPATIENT)
Dept: FAMILY MEDICINE | Facility: CLINIC | Age: 27
End: 2022-04-01
Payer: COMMERCIAL

## 2022-04-01 ENCOUNTER — OFFICE VISIT (OUTPATIENT)
Dept: FAMILY MEDICINE | Facility: CLINIC | Age: 27
End: 2022-04-01
Payer: COMMERCIAL

## 2022-04-01 VITALS
BODY MASS INDEX: 35.51 KG/M2 | TEMPERATURE: 98.1 F | RESPIRATION RATE: 20 BRPM | HEIGHT: 62 IN | DIASTOLIC BLOOD PRESSURE: 83 MMHG | SYSTOLIC BLOOD PRESSURE: 135 MMHG | WEIGHT: 193 LBS

## 2022-04-01 DIAGNOSIS — R10.2 SUPRAPUBIC CRAMPING: Primary | ICD-10-CM

## 2022-04-01 DIAGNOSIS — Z34.01 ENCOUNTER FOR SUPERVISION OF NORMAL FIRST PREGNANCY IN FIRST TRIMESTER: ICD-10-CM

## 2022-04-01 DIAGNOSIS — R11.0 NAUSEA: ICD-10-CM

## 2022-04-01 DIAGNOSIS — R30.0 DYSURIA: ICD-10-CM

## 2022-04-01 LAB
ALBUMIN UR-MCNC: ABNORMAL MG/DL
APPEARANCE UR: CLEAR
BACTERIA #/AREA URNS HPF: ABNORMAL /HPF
BILIRUB UR QL STRIP: NEGATIVE
COLOR UR AUTO: YELLOW
GLUCOSE UR STRIP-MCNC: NEGATIVE MG/DL
HCG SERPL-ACNC: ABNORMAL MLU/ML (ref 0–4)
HGB UR QL STRIP: NEGATIVE
KETONES UR STRIP-MCNC: 40 MG/DL
LEUKOCYTE ESTERASE UR QL STRIP: NEGATIVE
MUCOUS THREADS #/AREA URNS LPF: PRESENT /LPF
NITRATE UR QL: NEGATIVE
PH UR STRIP: 6.5 [PH] (ref 5–7)
RBC #/AREA URNS AUTO: ABNORMAL /HPF
SP GR UR STRIP: >=1.03 (ref 1–1.03)
SQUAMOUS #/AREA URNS AUTO: ABNORMAL /LPF
UROBILINOGEN UR STRIP-ACNC: 0.2 E.U./DL
WBC #/AREA URNS AUTO: ABNORMAL /HPF

## 2022-04-01 PROCEDURE — 81001 URINALYSIS AUTO W/SCOPE: CPT | Performed by: STUDENT IN AN ORGANIZED HEALTH CARE EDUCATION/TRAINING PROGRAM

## 2022-04-01 PROCEDURE — 99214 OFFICE O/P EST MOD 30 MIN: CPT | Mod: GC | Performed by: STUDENT IN AN ORGANIZED HEALTH CARE EDUCATION/TRAINING PROGRAM

## 2022-04-01 PROCEDURE — 84702 CHORIONIC GONADOTROPIN TEST: CPT | Performed by: STUDENT IN AN ORGANIZED HEALTH CARE EDUCATION/TRAINING PROGRAM

## 2022-04-01 PROCEDURE — 36415 COLL VENOUS BLD VENIPUNCTURE: CPT | Performed by: STUDENT IN AN ORGANIZED HEALTH CARE EDUCATION/TRAINING PROGRAM

## 2022-04-01 RX ORDER — PYRIDOXINE HCL (VITAMIN B6) 25 MG
25 TABLET ORAL DAILY
Qty: 90 TABLET | Refills: 0 | Status: SHIPPED | OUTPATIENT
Start: 2022-04-01 | End: 2022-05-03

## 2022-04-01 NOTE — TELEPHONE ENCOUNTER
Provider Recommendation Follow Up:   Reached patient/caregiver. Informed of provider's recommendations. Patient verbalized understanding and agrees with the plan.     HIRAL Galeas
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
## 2022-04-01 NOTE — PROGRESS NOTES
Preceptor Attestation:   Patient seen, evaluated and discussed with the resident. I have verified the content of the note, which accurately reflects my assessment of the patient and the plan of care.  Supervising Physician:Marilou Benítez MD  [facility]

## 2022-04-01 NOTE — TELEPHONE ENCOUNTER
Nurse Triage SBAR    Is this a 2nd Level Triage? YES, LICENSED PRACTITIONER REVIEW IS REQUIRED    Situation: sudden intermittent lower-middle abdominal cramping while walking or moving, started 30 minutes ago. 5/10 pain scale (moderate). Feels like gagging a lot but no nausea/vomiting/diarrhea, fever, or bleeding.     Background: US yesterday with dates 2w2d off from sure LMP. No other concerns with US, intrauterine pregnancy. Denies urine symptoms    Assessment: Due to having US yesterday, able to rule out ectopic pregnancy. Recommend office visit today to obtain beta hcg and assessment by physician.     Protocol Recommended Disposition:   Go to ED Now    Recommendation: Further recommendation on disposition, ok to be seen at 2:40 today in clinic?     Routed to provider and verbal hand-off    Does the patient meet one of the following criteria for ADS visit consideration? 17+ years old, with an [facility] PCP         Reason for Disposition    MODERATE-SEVERE abdominal pain    Additional Information    Negative: Passed out (i.e., fainted, collapsed and was not responding)    Negative: Shock suspected (e.g., cold/pale/clammy skin, too weak to stand, low BP, rapid pulse)    Negative: Difficult to awaken or acting confused (e.g., disoriented, slurred speech)    Negative: Sounds like a life-threatening emergency to the triager    Negative: Abdominal pain and pregnant > 20 weeks    Negative: SEVERE vaginal bleeding (e.g., soaking 2 pads per hour, large blood clots) and present 2 or more hours    Negative: MODERATE vaginal bleeding (e.g., soaking 1 pad per hour; clots) and present > 6 hours    Negative: MODERATE vaginal bleeding and pregnant > 12 weeks    Negative: Passed tissue (e.g., gray-white)    Negative: Vomiting and contains red blood or black ('coffee ground') material    Negative: Shoulder pain    Negative: MILD constant abdominal pain (e.g., doesn't interfere with normal activities) and present > 2 hours    Negative:  Intermittent lower abdominal pain lasting > 24 hours    Negative: Vaginal bleeding or spotting    Negative: White of the eyes have turned yellow (i.e., jaundice)    Negative: Lightheadedness or dizziness    Negative: Patient sounds very sick or weak to the triager    Negative: Unusual vaginal discharge (e.g., odorous, yellow, green, or foamy-white)    Negative: Discomfort when passing urine (e.g., pain, burning or stinging)    Negative: Blood in urine (red, pink, or tea-colored)    Negative: Patient wants to be seen    Negative: Fever > 100.4 F (38.0 C)    Negative: Prior history of 'ectopic pregnancy' or previous tubal surgery (e.g., tubal ligation)    Negative: Has IUD    Protocols used: PREGNANCY - ABDOMINAL PAIN LESS THAN 20 WEEKS EGA-A-OH

## 2022-04-01 NOTE — TELEPHONE ENCOUNTER
Warm hand-off provided, symptoms reviewed. Agree with plan to be seen in clinic today to further evaluate.     Benjamin Rosenstein, MD, MA  [facility]

## 2022-04-22 ENCOUNTER — TELEPHONE (OUTPATIENT)
Dept: FAMILY MEDICINE | Facility: CLINIC | Age: 27
End: 2022-04-22
Payer: COMMERCIAL

## 2022-04-22 NOTE — TELEPHONE ENCOUNTER
[facility] phone call message- medication clarification/question:    Full Medication Name:     pyridOXINE (VITAMIN B6)         Dose: 25 MG tablet      Question:     Patient call and advise vitmains is not helping her and would like to know if there are other alternatives. I advise may be needing and appointment and pt advise would like to speak to RN or Doctor.      Pharmacy confirmed as [id number] - [place], [place] - [address] AT [facility]: Yes    OK to leave a message on voice mail? Yes    Primary language: English      needed? No    Call taken on April 22, 2022 at 8:22 AM by Anu Garcia

## 2022-04-25 NOTE — TELEPHONE ENCOUNTER
If patient is having trouble with nausea we should probably see her back to check in on weight and discuss other options.  Please help her arrange follow up with whomever she may be seeing for her OB care.      Thanks,  Amrik Lopez MD on 4/24/2022 at 7:56 PM

## 2022-04-25 NOTE — TELEPHONE ENCOUNTER
Called patient to schedule for the appt and informed about the appt. Patient would gave us a call for the appt.     Alyssa Way

## 2022-04-25 NOTE — TELEPHONE ENCOUNTER
Patient called back regarding scheduling appointment with OB provider for nausea. Doesn't look like she has an OB provider yet. Will route to Triage nurse for OB intake.
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
## 2022-05-02 NOTE — PROGRESS NOTES
First Obstetric Visit       HPI       Rashmi Oliva is a 27 year old woman who presents for an initial prenatal visit at Unknown pregnant with DARLENE of  2022 by US, off from Patient's last menstrual period was 2022..  She has not had bleeding since her LMP. She has had nausea, very poor. Has had emesis. Weight loss has occurred, for a total of 4 pounds.  This was not a planned pregnancy.     Nausea is worse in the night.    OTHER CONCERNS: None.     Labor Risk Assessment   Is the patient's age <18 or >40?    No  Patint's BMI is Body mass index is 35 kg/m . Does patient have a BMI < 18.5?    No  If previous pregnancy, was delivery within previous 6 months?    No  Have you ever delivered a baby prior to 37 weeks gestation?    No  Did conception for this pregnancy occur via In Vitro Fertilization?    No  Are you carrying twins?    No    Summary:  Patient is Average/high risk for  Labor (verify Problem List includes V23.9 and note risk factor(s) in the Comments)  The patient has the following risk factors for  labor: None.    Average risk pregnancy  Past Medical History  Past Medical History:   Diagnosis Date     Chlamydia      Gestational diabetes      Insulin controlled gestational diabetes mellitus (GDM) during pregnancy 2019     Wounds and injuries        Do you have a history of any of the following medical conditions?    Condition Yes/No   Hypertension No   Heart disease, mitral valve prolapse, rheumatic fever No   Asthma or another chronic lung disease No   An autoimmune disorder No   Kidney disease No   Frequent urinary tract infections No   Migraine headaches No   Stroke, loss of sensation/function, seizures, or other neuro problem No   Diabetes No   Thyroid problems or have you taken thyroid medication No   Hepatitis, liver disease, jaundice No   Blood clots, phlebitis, pulmonary embolism or varicose veins No   Excessive bleeding after surgery or dental work No   Heavy  menstrual periods requiring treatment No   Anemia No   Blood transfusions No        Would you refuse a blood transfusion? No   Breast problems No   Abnormalities of the uterus No   Abnormal pap smear No   Have you been treated for an emotional disturbance? No   Have you been physically, sexually, or emotionally hurt by someone? No   Have you been in a major accident or suffered serious trauma? No   Have you had surgical procedures? No        Have you ever had any complications from anesthesia? No   Have you ever been hospitalized for a nonsurgical reason? No     Notes for positives None this pregnancy.    Prenatal Genetic Screening    Do you have a history of any of the following Yes/No        A metabolic disorder (e.g. Insulin-dependent DM, PKU) No        Recurrent pregnancy loss No     Do you, the baby's father, or anyone in your families have Yes/No        Thalassemia AND MCV <80 No        Hemophilia No        Neural tube defect No        Congenital heart defect No        Sickle cell disease or trait No        Muscular dystrophy No        Cystic fibrosis No        Mental retardation or autism No        Down's syndrome No        Michael-Sach's disease No        Karolyn's chorea No        Any other inherited genetic or chromosomal disorder No        A child with birth defects not listed above No     Infection History    At high risk for coming in contact with HIV No   Ever treated for tuberculosis No   Ever received the BCG vaccine for tuberculosis No   Ever had genital herpes (or has your partner) No   Had a rash or viral illness since LMP No   Ever had a sexually transmitted infection No   Ever had chicken pox or the vaccine Yes   Ever had any other serious infectious disease No   Up to date with immunizations Yes       Habits  Smoker: 1 pack lasted her 2 weeks; also vaping; has quit- when she found out she was pregnant.  Alcohol: None.   Current medications are:  Current Outpatient Medications   Medication Sig  "Dispense Refill     doxylamine (UNISOM) 25 MG TABS tablet Take 1 tablet (25 mg) by mouth 3 times daily as needed for sleep or other (nausea) 90 tablet 3     famotidine (PEPCID) 20 MG tablet Take 1 tablet (20 mg) by mouth 2 times daily 90 tablet 3     Prenatal Vit-Fe Fumarate-FA (PRENATAL MULTIVITAMIN W/IRON) 27-0.8 MG tablet Take 1 tablet by mouth daily 90 tablet 3     pyridOXINE (VITAMIN B6) 25 MG tablet Take 1 tablet (25 mg) by mouth 3 times daily 90 tablet 0       REVIEW OF SYSTEMS  ENT: NEGATIVE for ear, mouth and throat problems  CV: NEGATIVE for chest pain, palpitations or peripheral edema  GI: NEGATIVE for nausea, abdominal pain, heartburn, or change in bowel habits  : NEGATIVE for unusual urinary or vaginal symptoms. Periods are regular.  C: NEGATIVE for fever, chills, change in weight  I: NEGATIVE for worrisome rashes, moles or lesions  E: NEGATIVE for vision changes or irritation  R: NEGATIVE for significant cough or SOB  B: NEGATIVE for masses, tenderness or discharge  M: NEGATIVE for significant arthralgias or myalgia  N: NEGATIVE for weakness, dizziness or paresthesias  P: NEGATIVE for changes in mood or affect  ====================================================    PHYSICAL EXAM:  /85   Pulse 89   Temp 97.9  F (36.6  C)   Resp 20   Ht 1.565 m (5' 1.61\")   Wt 85.7 kg (189 lb)   LMP 01/28/2022   SpO2 99%   BMI 35.00 kg/m         GENERAL:  Pleasant pregnant female, alert, well groomed.  SKIN:  Warm and dry, without lesions or rashes  HEAD: Symmetrical features.  EYES:  PERRL, EOMI.  MOUTH:  Buccal mucosa pink, moist without lesions.    NECK:  Thyroid without enlargement and nodules.  Lymph nodes not palpable.  LUNGS:  Clear to auscultation.  HEART:  RRR without murmur.  ABDOMEN: Soft without masses, tenderness or organomegaly.  No CVA tenderness. No scars noted.  MUSCULOSKELETAL:  Full range of motion  EXTREMITIES:  No edema. No significant varicosities.   GENITALIA:  BUS WNL, no lesions " noted   VAGINA:  Pink, normal rugae and discharge normal and physiologic{  CERVIX:  smooth, without discharge or CMT.  =========================================    ASSESSMENT/PLAN    (Z34.81) Encounter for supervision of other normal pregnancy in first trimester  (primary encounter diagnosis)  Comment: Average risk pregnancy in .  Plan: ABO/Rh Type-HML, Antibody Screen, CBC with Plt,        Culture Urine, Hepatitis B Surface Ag, HIV         Ag/Ab Screen Cascade, Rubella Antibody IgG,         Syphilis Screen Cascade, Glucose whole blood            (Z34.01) Encounter for supervision of normal first pregnancy in first trimester  Plan: doxylamine (UNISOM) 25 MG TABS tablet,         famotidine (PEPCID) 20 MG tablet, pyridOXINE         (VITAMIN B6) 25 MG tablet, Neisseria         gonorrhoeae PCR, Chlamydia Trachomatis PCR,         Gynecologic Cytology (PAP)    (R11.0) Nausea  Comment: Increased N/V due to pregnancy, will increase B6 to TID PRN and add unisom TID PRN. Also adding pepcid BID--previous h/o GERD in pregnancy.  Plan: doxylamine (UNISOM) 25 MG TABS tablet,         famotidine (PEPCID) 20 MG tablet, pyridOXINE         (VITAMIN B6) 25 MG tablet           Average risk pregnancy  Instructed on best evidence for: weight gain for her BMI for pregnancy; healthy diet and foods to avoid; exercise and activity during pregnancy;  avoiding exposure to toxoplasmosis; and maintenance of a generally healthy lifestyle.   Discussed the harms, benefits, side effects and alternative therapies for current prescribed and OTC medications.    Options for treatment and follow-up care were reviewed with the patient and/or guardian. Rashmi Oliva and/or guardian engaged in the decision making process and verbalized understanding of the options discussed and agreed with the final plan.    Precepted with Dr. Trejo.    Caridad Alejo MD

## 2022-05-03 ENCOUNTER — OFFICE VISIT (OUTPATIENT)
Dept: FAMILY MEDICINE | Facility: CLINIC | Age: 27
End: 2022-05-03
Payer: COMMERCIAL

## 2022-05-03 VITALS
TEMPERATURE: 97.9 F | RESPIRATION RATE: 20 BRPM | DIASTOLIC BLOOD PRESSURE: 85 MMHG | OXYGEN SATURATION: 99 % | SYSTOLIC BLOOD PRESSURE: 131 MMHG | BODY MASS INDEX: 34.78 KG/M2 | WEIGHT: 189 LBS | HEART RATE: 89 BPM | HEIGHT: 62 IN

## 2022-05-03 DIAGNOSIS — R11.0 NAUSEA: ICD-10-CM

## 2022-05-03 DIAGNOSIS — Z34.81 ENCOUNTER FOR SUPERVISION OF OTHER NORMAL PREGNANCY IN FIRST TRIMESTER: Primary | ICD-10-CM

## 2022-05-03 DIAGNOSIS — Z34.01 ENCOUNTER FOR SUPERVISION OF NORMAL FIRST PREGNANCY IN FIRST TRIMESTER: ICD-10-CM

## 2022-05-03 LAB
ABO/RH(D): NORMAL
ANTIBODY SCREEN: NEGATIVE
ERYTHROCYTE [DISTWIDTH] IN BLOOD BY AUTOMATED COUNT: 13 % (ref 10–15)
GLUCOSE BLD-MCNC: 98 MG/DL (ref 60–99)
HBV SURFACE AG SERPL QL IA: NONREACTIVE
HCT VFR BLD AUTO: 41.5 % (ref 35–47)
HGB BLD-MCNC: 13.9 G/DL (ref 11.7–15.7)
HIV 1+2 AB+HIV1 P24 AG SERPL QL IA: NEGATIVE
MCH RBC QN AUTO: 27.5 PG (ref 26.5–33)
MCHC RBC AUTO-ENTMCNC: 33.5 G/DL (ref 31.5–36.5)
MCV RBC AUTO: 82 FL (ref 78–100)
PLATELET # BLD AUTO: 353 10E3/UL (ref 150–450)
RBC # BLD AUTO: 5.05 10E6/UL (ref 3.8–5.2)
SPECIMEN EXPIRATION DATE: NORMAL
SPECIMEN EXPIRATION DATE: NORMAL
WBC # BLD AUTO: 8.8 10E3/UL (ref 4–11)

## 2022-05-03 PROCEDURE — 86780 TREPONEMA PALLIDUM: CPT | Performed by: STUDENT IN AN ORGANIZED HEALTH CARE EDUCATION/TRAINING PROGRAM

## 2022-05-03 PROCEDURE — 86900 BLOOD TYPING SEROLOGIC ABO: CPT | Performed by: STUDENT IN AN ORGANIZED HEALTH CARE EDUCATION/TRAINING PROGRAM

## 2022-05-03 PROCEDURE — 82947 ASSAY GLUCOSE BLOOD QUANT: CPT | Performed by: STUDENT IN AN ORGANIZED HEALTH CARE EDUCATION/TRAINING PROGRAM

## 2022-05-03 PROCEDURE — 86762 RUBELLA ANTIBODY: CPT | Performed by: STUDENT IN AN ORGANIZED HEALTH CARE EDUCATION/TRAINING PROGRAM

## 2022-05-03 PROCEDURE — 87389 HIV-1 AG W/HIV-1&-2 AB AG IA: CPT | Performed by: STUDENT IN AN ORGANIZED HEALTH CARE EDUCATION/TRAINING PROGRAM

## 2022-05-03 PROCEDURE — G0123 SCREEN CERV/VAG THIN LAYER: HCPCS | Performed by: STUDENT IN AN ORGANIZED HEALTH CARE EDUCATION/TRAINING PROGRAM

## 2022-05-03 PROCEDURE — 36415 COLL VENOUS BLD VENIPUNCTURE: CPT | Performed by: STUDENT IN AN ORGANIZED HEALTH CARE EDUCATION/TRAINING PROGRAM

## 2022-05-03 PROCEDURE — 86901 BLOOD TYPING SEROLOGIC RH(D): CPT | Performed by: STUDENT IN AN ORGANIZED HEALTH CARE EDUCATION/TRAINING PROGRAM

## 2022-05-03 PROCEDURE — 99207 PR FIRST OB VISIT: CPT | Mod: GC | Performed by: STUDENT IN AN ORGANIZED HEALTH CARE EDUCATION/TRAINING PROGRAM

## 2022-05-03 PROCEDURE — 87591 N.GONORRHOEAE DNA AMP PROB: CPT | Performed by: STUDENT IN AN ORGANIZED HEALTH CARE EDUCATION/TRAINING PROGRAM

## 2022-05-03 PROCEDURE — 85027 COMPLETE CBC AUTOMATED: CPT | Performed by: STUDENT IN AN ORGANIZED HEALTH CARE EDUCATION/TRAINING PROGRAM

## 2022-05-03 PROCEDURE — 87086 URINE CULTURE/COLONY COUNT: CPT | Performed by: STUDENT IN AN ORGANIZED HEALTH CARE EDUCATION/TRAINING PROGRAM

## 2022-05-03 PROCEDURE — 87491 CHLMYD TRACH DNA AMP PROBE: CPT | Performed by: STUDENT IN AN ORGANIZED HEALTH CARE EDUCATION/TRAINING PROGRAM

## 2022-05-03 PROCEDURE — 86850 RBC ANTIBODY SCREEN: CPT | Performed by: STUDENT IN AN ORGANIZED HEALTH CARE EDUCATION/TRAINING PROGRAM

## 2022-05-03 PROCEDURE — 87340 HEPATITIS B SURFACE AG IA: CPT | Performed by: STUDENT IN AN ORGANIZED HEALTH CARE EDUCATION/TRAINING PROGRAM

## 2022-05-03 RX ORDER — PYRIDOXINE HCL (VITAMIN B6) 25 MG
25 TABLET ORAL 3 TIMES DAILY
Qty: 90 TABLET | Refills: 0 | Status: ON HOLD | OUTPATIENT
Start: 2022-05-03 | End: 2022-11-16

## 2022-05-03 RX ORDER — FAMOTIDINE 20 MG/1
20 TABLET, FILM COATED ORAL 2 TIMES DAILY
Qty: 90 TABLET | Refills: 3 | Status: SHIPPED | OUTPATIENT
Start: 2022-05-03 | End: 2022-09-26

## 2022-05-04 LAB
BKR LAB AP GYN ADEQUACY: NORMAL
BKR LAB AP GYN INTERPRETATION: NORMAL
BKR LAB AP HPV REFLEX: NORMAL
BKR LAB AP LMP: NORMAL
BKR LAB AP PREVIOUS ABNORMAL: NORMAL
C TRACH DNA SPEC QL NAA+PROBE: NEGATIVE
N GONORRHOEA DNA SPEC QL NAA+PROBE: NEGATIVE
PATH REPORT.COMMENTS IMP SPEC: NORMAL
PATH REPORT.COMMENTS IMP SPEC: NORMAL
PATH REPORT.RELEVANT HX SPEC: NORMAL
RUBV IGG SERPL QL IA: 1.75 INDEX
RUBV IGG SERPL QL IA: POSITIVE
T PALLIDUM AB SER QL: NONREACTIVE

## 2022-05-05 LAB — BACTERIA UR CULT: NORMAL

## 2022-05-14 ENCOUNTER — HEALTH MAINTENANCE LETTER (OUTPATIENT)
Age: 27
End: 2022-05-14

## 2022-05-31 ENCOUNTER — OFFICE VISIT (OUTPATIENT)
Dept: FAMILY MEDICINE | Facility: CLINIC | Age: 27
End: 2022-05-31
Payer: COMMERCIAL

## 2022-05-31 VITALS
BODY MASS INDEX: 33.86 KG/M2 | RESPIRATION RATE: 20 BRPM | SYSTOLIC BLOOD PRESSURE: 108 MMHG | WEIGHT: 184 LBS | DIASTOLIC BLOOD PRESSURE: 72 MMHG | TEMPERATURE: 98.6 F | HEART RATE: 80 BPM | HEIGHT: 62 IN | OXYGEN SATURATION: 95 %

## 2022-05-31 DIAGNOSIS — Z34.82 ENCOUNTER FOR SUPERVISION OF OTHER NORMAL PREGNANCY IN SECOND TRIMESTER: Primary | ICD-10-CM

## 2022-05-31 DIAGNOSIS — O21.0 HYPEREMESIS GRAVIDARUM: ICD-10-CM

## 2022-05-31 PROCEDURE — 99000 SPECIMEN HANDLING OFFICE-LAB: CPT | Performed by: STUDENT IN AN ORGANIZED HEALTH CARE EDUCATION/TRAINING PROGRAM

## 2022-05-31 PROCEDURE — 81511 FTL CGEN ABNOR FOUR ANAL: CPT | Mod: 90 | Performed by: STUDENT IN AN ORGANIZED HEALTH CARE EDUCATION/TRAINING PROGRAM

## 2022-05-31 PROCEDURE — 99207 PR PRENATAL VISIT: CPT | Mod: GC | Performed by: STUDENT IN AN ORGANIZED HEALTH CARE EDUCATION/TRAINING PROGRAM

## 2022-05-31 PROCEDURE — 36415 COLL VENOUS BLD VENIPUNCTURE: CPT | Performed by: STUDENT IN AN ORGANIZED HEALTH CARE EDUCATION/TRAINING PROGRAM

## 2022-05-31 RX ORDER — ONDANSETRON 4 MG/1
4 TABLET, ORALLY DISINTEGRATING ORAL EVERY 8 HOURS PRN
Qty: 90 TABLET | Refills: 0 | Status: ON HOLD | OUTPATIENT
Start: 2022-05-31 | End: 2022-11-16

## 2022-05-31 ASSESSMENT — PAIN SCALES - GENERAL: PAINLEVEL: NO PAIN (0)

## 2022-05-31 NOTE — PROGRESS NOTES
"SUBJECTIVE:  Rashmi Oliva is a  at 15w2d gestation by 6 week US who returns today for prenatal care. Estimated Date of Delivery: 2022    - Concerns today: Has been having GERD; has been taking pepcid twice per day. Has also been taking prenatal vitamin. Continues to lose weight. Still taking vitamin B6.  Has continued to have emesis after eating--mainly can eat fruits.  Drinking a lot of water.     Rash--legs--mainly on the thighs and on chest. Very itchy, gone now. Get them mainly 1 day and then resolves slowly throughout the day.  No new soaps/lotions/detergents. Outside a lot more this weekend. Has had this intermittently. No known allergies in the past. Has had some seasonal allergies in the fall--mainly sinus congestion.    - Patient reports no vaginal bleeding, no contractions/severe cramping, no leakage of fluid. Fetal movement is Present.   - No vaginal discharge. No dysuria.   - No headache, vision changes, lower extremity swelling, upper abdominal pain, chest pain, shortness of breath.   - Mood has been .    Rashmi Oliva speaks English so an  was not used today.    OBJECTIVE:  /72   Pulse 80   Temp 98.6  F (37  C) (Oral)   Resp 20   Ht 1.57 m (5' 1.81\")   Wt 83.5 kg (184 lb)   LMP 2022   SpO2 95%   BMI 33.86 kg/m    Const: No distress  Abd: Gravid.   See flowsheet for fundal height, FHTs, edema     Labs today: No results found for any visits on 22.    ASSESSMENT/PLAN:  27 year old , 15w2d weeks of pregnancy with DARLENE of 2022, by Ultrasound, confirmed by 6 week US.    Rashmi was seen today for prenatal care.    Diagnoses and all orders for this visit:    Encounter for supervision of other normal pregnancy in second trimester        - Prenatal labs reviewed: yes, wnl.  - Pregnancy complicated by: Poor weight gain.  - (Prior to 15-18 weeks) Discussed quad screen. Patient Does want to proceed with screening.   - (Prior to 20 weeks) Discussed and " ordered ultrasound for fetal survey.   - Flu shot this pregnancy: not up to date - declines. Discussed benefits for COVID-19 vaccine, would like to discuss with her .  - Pregnancy weight gain has been low. Discussed addition of zofran, plan to work on liquid diet as she is tolerating liquids, encouraged protein shakes, smoothies, yogurts, ice cream. Will follow closely.  -Counseled patient on topics as marked in the OB checklist.   -Breastfeeding education discussed. Plans to breast feed.    - Patient will continue taking prenatal vitamins and avoiding cigarettes & alcohol.   -Patient recommended to see OB educator/RN today and/or future visit for education and/or care coordination.    - Return to clinic in 4 weeks.     Options for treatment and/or follow-up care were reviewed with the patient. Rashmi Oliva was engaged and actively involved in the decision making process. She verbalized understanding of the options discussed and was satisfied with the final plan.    Precepted with Dr. Rosenstein.   Caridad Alejo MD

## 2022-05-31 NOTE — PROGRESS NOTES
Preceptor Attestation:   Patient seen, evaluated and discussed with the resident Dr. Alejo. I have verified the content of the note, which accurately reflects my assessment of the patient and the plan of care.    Poor weight gain related to significant nausea/vomiting. Sending with zofran and dietary modifications.     Supervising Physician:Benjamin Rosenstein, MD, MA  [facility]

## 2022-06-02 LAB
# FETUSES US: NORMAL
AFP MOM SERPL: 0.86
AFP SERPL-MCNC: 23 NG/ML
AGE - REPORTED: 26.7 YR
CURRENT SMOKER: NO
FAMILY MEMBER DISEASES HX: NO
GA METHOD: NORMAL
GA: NORMAL WK
HCG MOM SERPL: 1.23
HCG SERPL-ACNC: NORMAL IU/L
HX OF HEREDITARY DISORDERS: NO
IDDM PATIENT QL: NO
INHIBIN A MOM SERPL: 0.96
INHIBIN A SERPL-MCNC: 146 PG/ML
INTEGRATED SCN PATIENT-IMP: NORMAL
PATHOLOGY STUDY: NORMAL
SPECIMEN DRAWN SERPL: NORMAL
U ESTRIOL MOM SERPL: 1.12
U ESTRIOL SERPL-MCNC: 0.8 NG/ML

## 2022-07-01 ENCOUNTER — OFFICE VISIT (OUTPATIENT)
Dept: FAMILY MEDICINE | Facility: CLINIC | Age: 27
End: 2022-07-01
Payer: COMMERCIAL

## 2022-07-01 VITALS
TEMPERATURE: 98.5 F | HEART RATE: 89 BPM | BODY MASS INDEX: 33.68 KG/M2 | SYSTOLIC BLOOD PRESSURE: 115 MMHG | OXYGEN SATURATION: 100 % | RESPIRATION RATE: 20 BRPM | DIASTOLIC BLOOD PRESSURE: 75 MMHG | WEIGHT: 183 LBS

## 2022-07-01 DIAGNOSIS — Z34.82 ENCOUNTER FOR SUPERVISION OF OTHER NORMAL PREGNANCY IN SECOND TRIMESTER: Primary | ICD-10-CM

## 2022-07-01 DIAGNOSIS — O26.12 LOW WEIGHT GAIN DURING PREGNANCY IN SECOND TRIMESTER: ICD-10-CM

## 2022-07-01 DIAGNOSIS — O21.0 HYPEREMESIS GRAVIDARUM: ICD-10-CM

## 2022-07-01 PROCEDURE — 99207 PR PRENATAL VISIT: CPT | Mod: GC | Performed by: STUDENT IN AN ORGANIZED HEALTH CARE EDUCATION/TRAINING PROGRAM

## 2022-07-01 NOTE — PROGRESS NOTES
SUBJECTIVE:  Rashmi Oliva is a  at 19w5d gestation by 6 week US who returns today for prenatal care. Estimated Date of Delivery: 2022    Poor weight gain. Lost 1 lb since last visit.  Feels she is eating well in the day,  But not  In the evening--around 5-6 PM; feels hungry but has a food aversion.   Has tried to do a zofran at dinner time.     Eating [employer] breakfast, noodles. Has a large breakfast.  Goes to eat for lunch.  Does have plenty of high calorie intake.     BM are regular, daily. No constipation.     - Patient reports no vaginal bleeding, no contractions/severe cramping, no leakage of fluid. Fetal movement is Present.    - No vaginal discharge. No dysuria.   - No headache, vision changes, lower extremity swelling, upper abdominal pain, chest pain, shortness of breath.   - Mood has been a little more irritable, denies depression.     Rashmi Oliva speaks English so an  was not used today.    OBJECTIVE:  /75   Pulse 89   Temp 98.5  F (36.9  C) (Oral)   Resp 20   Wt 83 kg (183 lb)   LMP 2022   SpO2 100%   BMI 33.68 kg/m    Const: No distress  Abd: Gravid.   See flowsheet for fundal height, FHTs, edema     Labs today: No results found for any visits on 22.    ASSESSMENT/PLAN:  27 year old , 19w5d weeks of pregnancy with DARLENE of 2022, by Ultrasound,     Diagnoses and all orders for this visit:    Encounter for supervision of other normal pregnancy in second trimester    Hyperemesis gravidarum    Low weight gain during pregnancy in second trimester        Risk: High Risk   Date determined: 2022  - Pregnancy complicated by: H/o GDM, poor weight gain in pregnancy.  - Prenatal labs reviewed.     -Patient is O positive, no further follow up needed.  - Full anatomy screen to be done on .     - (After 27 weeks) Plan for Tdap.  -  COVID vaccination, discussed benefits; declines today.    - Pregnancy weight gain has been -4.536 kg (-10 lb) to date,  which is Low. Plan to follow up in 1 week. Discussed boost/ensure/carnation instant breakfast daily to increase caloric intake. Following very closely, will need to consider further intervention if continues to have poor weight gain in 1 week.     - Patient will continue taking prenatal vitamins and avoiding cigarettes & alcohol.   -Breastfeeding education discussed. More details in Breastfeeding AVS; plans to breast feed    - Contraception options after delivery: patient is interested in Combination OCP.     -Patient recommended to see OB educator/RN today and/or future visit for education and/or care coordination.    Return to clinic in 1 weeks.     Options for treatment and/or follow-up care were reviewed with the patient. Rashmi Oliva was engaged and actively involved in the decision making process. She verbalized understanding of the options discussed and was satisfied with the final plan.    Precepted with Dr. Rosenstein.    Caridad Alejo MD

## 2022-07-01 NOTE — PROGRESS NOTES
Preceptor Attestation:   Patient seen, evaluated and discussed with the resident Dr. Alejo. I have verified the content of the note, which accurately reflects my assessment of the patient and the plan of care.  Supervising Physician:Benjamin Rosenstein, MD, MA  [facility]

## 2022-07-07 ENCOUNTER — ANCILLARY PROCEDURE (OUTPATIENT)
Dept: ULTRASOUND IMAGING | Facility: CLINIC | Age: 27
End: 2022-07-07
Attending: FAMILY MEDICINE
Payer: COMMERCIAL

## 2022-07-07 DIAGNOSIS — Z34.82 ENCOUNTER FOR SUPERVISION OF OTHER NORMAL PREGNANCY IN SECOND TRIMESTER: ICD-10-CM

## 2022-07-07 PROCEDURE — 76805 OB US >/= 14 WKS SNGL FETUS: CPT | Mod: TC | Performed by: RADIOLOGY

## 2022-07-08 ENCOUNTER — OFFICE VISIT (OUTPATIENT)
Dept: FAMILY MEDICINE | Facility: CLINIC | Age: 27
End: 2022-07-08
Payer: COMMERCIAL

## 2022-07-08 VITALS
BODY MASS INDEX: 34.36 KG/M2 | SYSTOLIC BLOOD PRESSURE: 93 MMHG | RESPIRATION RATE: 22 BRPM | WEIGHT: 182 LBS | OXYGEN SATURATION: 99 % | DIASTOLIC BLOOD PRESSURE: 62 MMHG | HEIGHT: 61 IN | HEART RATE: 84 BPM | TEMPERATURE: 98 F

## 2022-07-08 DIAGNOSIS — O26.12 LOW WEIGHT GAIN DURING PREGNANCY IN SECOND TRIMESTER: Primary | ICD-10-CM

## 2022-07-08 DIAGNOSIS — Z34.82 ENCOUNTER FOR SUPERVISION OF OTHER NORMAL PREGNANCY IN SECOND TRIMESTER: ICD-10-CM

## 2022-07-08 LAB — GLUCOSE SERPL-MCNC: 111 MG/DL (ref 70–99)

## 2022-07-08 PROCEDURE — 36416 COLLJ CAPILLARY BLOOD SPEC: CPT | Performed by: STUDENT IN AN ORGANIZED HEALTH CARE EDUCATION/TRAINING PROGRAM

## 2022-07-08 PROCEDURE — 99207 PR PRENATAL VISIT: CPT | Mod: GC | Performed by: STUDENT IN AN ORGANIZED HEALTH CARE EDUCATION/TRAINING PROGRAM

## 2022-07-08 PROCEDURE — 36415 COLL VENOUS BLD VENIPUNCTURE: CPT | Performed by: STUDENT IN AN ORGANIZED HEALTH CARE EDUCATION/TRAINING PROGRAM

## 2022-07-08 PROCEDURE — 82947 ASSAY GLUCOSE BLOOD QUANT: CPT | Performed by: STUDENT IN AN ORGANIZED HEALTH CARE EDUCATION/TRAINING PROGRAM

## 2022-07-08 RX ORDER — FEEDER CONTAINER WITH PUMP SET
1 EACH MISCELLANEOUS
Qty: 21330 ML | Refills: 4 | Status: SHIPPED | OUTPATIENT
Start: 2022-07-08 | End: 2022-08-23

## 2022-07-08 NOTE — PROGRESS NOTES
Preceptor Attestation:   Patient seen, evaluated and discussed with the resident Dr. Alejo. I have verified the content of the note, which accurately reflects my assessment of the patient and the plan of care.    Continues to have weight loss - even with elevated pre-pregnancy BMI would at least hope to be stable. Early testing for GDM, add supplement.    Supervising Physician:Benjamin Rosenstein, MD, MA [facility]

## 2022-07-08 NOTE — PROGRESS NOTES
"SUBJECTIVE:  Rashmi Oliva is a  at 20w5d gestation by 6 week US who returns today for prenatal care. Estimated Date of Delivery: 2022    - Concerns today: Poor weight gain.     Tried 2 supplements of carnation instant breakfast daily; one morning and at lunch. Also eating larger meals at breakfast and lunch.  Eating very little bit of dinner.     If she eats a large dinner, it feels heavy and like she might vomit.  Has not had any vomiting since the 1st trimester.    Has changed her diet to include more fruit.  Having some aversions to meat.    Water throughout the day.    Mood is crabby.  H/o depression as a teenager. Went to therapy for a while.  Never medicated.   No h/o postpartum depression.    Still taking pepcid for heartburn.  Still taking vitabmin B    Taking zofran PRN    Has fairly physically demanding--lots of walking--at job, since April. Stressful with management change.  Works in a bank.  Has trouble with taking breaks.     - Patient reports no vaginal bleeding, no contractions/severe cramping, no leakage of fluid. Fetal movement is Present.     Going to [facility]IC? Yes    Rashmi Oliva speaks English so an  was not used today.    OBJECTIVE:  BP 93/62   Pulse 84   Temp 98  F (36.7  C)   Resp 22   Ht 1.549 m (5' 1\")   Wt 82.6 kg (182 lb)   LMP 2022   SpO2 99%   BMI 34.39 kg/m    Const: No distress  Abd: Gravid.   See flowsheet for fundal height, FHTs, edema     Labs today: No results found for any visits on 22.    ASSESSMENT/PLAN:  27 year old , 20w5d weeks of pregnancy with DARLENE of 2022, by Ultrasound,     Rashmi was seen today for prenatal care.    Diagnoses and all orders for this visit:    Low weight gain during pregnancy in second trimester  Fasting glucose is slightly elevated, 111 today. Plan for early GCT next week. If passes, will repeat at 26 weeks gestation. Ordering supplements, plan for ensure high protein 3 times daily. Plan for screening of " hgb and RPR at next visit as well.      Encounter for supervision of other normal pregnancy in second trimester  Reassured by normal US, normal fundal height. Counseled on need to continue to monitor weight closely.  -     Glucose; Future      Risk: High Risk   Date determined: 7/8/2022    - Pregnancy complicated by: h/o GDM, poor weight gain.    - Prenatal labs reviewed.     - Fetal survey showed the following abnormalities: none, wnl; a follow up ultrasound is not required.    - (24-28weeks) Discussed screening for gestational diabetes. 1 hour GCT, second trimester hemoglobin, and repeat syphilis screening will be obtained . Planning to screen early, next week around 22 weeks gestation. If    - Pregnancy weight gain has been -4.99 kg (-11 lb) to date, which is low, see plan above.    - Patient will continue taking prenatal vitamins and avoiding cigarettes & alcohol.   - Contraception options after delivery: patient is interested in Combination OCP.    -Patient recommended to see OB educator/RN today and/or future visit for education and/or care coordination.    Return to clinic in 1 week.     Options for treatment and/or follow-up care were reviewed with the patient. Rashmi Oliva was engaged and actively involved in the decision making process. She verbalized understanding of the options discussed and was satisfied with the final plan.    Precepted with Dr. Rosenstein.    Caridad Alejo MD

## 2022-07-14 ENCOUNTER — TELEPHONE (OUTPATIENT)
Dept: FAMILY MEDICINE | Facility: CLINIC | Age: 27
End: 2022-07-14

## 2022-07-14 ENCOUNTER — OFFICE VISIT (OUTPATIENT)
Dept: FAMILY MEDICINE | Facility: CLINIC | Age: 27
End: 2022-07-14
Payer: COMMERCIAL

## 2022-07-14 VITALS
HEIGHT: 62 IN | DIASTOLIC BLOOD PRESSURE: 77 MMHG | SYSTOLIC BLOOD PRESSURE: 117 MMHG | TEMPERATURE: 97.9 F | BODY MASS INDEX: 33.49 KG/M2 | HEART RATE: 72 BPM | WEIGHT: 182 LBS | RESPIRATION RATE: 20 BRPM | OXYGEN SATURATION: 99 %

## 2022-07-14 DIAGNOSIS — Z34.82 ENCOUNTER FOR SUPERVISION OF OTHER NORMAL PREGNANCY IN SECOND TRIMESTER: Primary | ICD-10-CM

## 2022-07-14 DIAGNOSIS — O26.12 LOW WEIGHT GAIN DURING PREGNANCY IN SECOND TRIMESTER: ICD-10-CM

## 2022-07-14 LAB
GLUCOSE 1H P 50 G GLC PO SERPL-MCNC: 167 MG/DL (ref 70–129)
HGB BLD-MCNC: 12.3 G/DL (ref 11.7–15.7)
T PALLIDUM AB SER QL: NONREACTIVE
TSH SERPL DL<=0.005 MIU/L-ACNC: 0.77 UIU/ML (ref 0.3–4.2)

## 2022-07-14 PROCEDURE — 36415 COLL VENOUS BLD VENIPUNCTURE: CPT | Performed by: STUDENT IN AN ORGANIZED HEALTH CARE EDUCATION/TRAINING PROGRAM

## 2022-07-14 PROCEDURE — 86780 TREPONEMA PALLIDUM: CPT | Performed by: STUDENT IN AN ORGANIZED HEALTH CARE EDUCATION/TRAINING PROGRAM

## 2022-07-14 PROCEDURE — 85018 HEMOGLOBIN: CPT | Performed by: STUDENT IN AN ORGANIZED HEALTH CARE EDUCATION/TRAINING PROGRAM

## 2022-07-14 PROCEDURE — 84443 ASSAY THYROID STIM HORMONE: CPT | Performed by: STUDENT IN AN ORGANIZED HEALTH CARE EDUCATION/TRAINING PROGRAM

## 2022-07-14 PROCEDURE — 99207 PR PRENATAL VISIT: CPT | Mod: GC | Performed by: STUDENT IN AN ORGANIZED HEALTH CARE EDUCATION/TRAINING PROGRAM

## 2022-07-14 PROCEDURE — 82950 GLUCOSE TEST: CPT | Performed by: STUDENT IN AN ORGANIZED HEALTH CARE EDUCATION/TRAINING PROGRAM

## 2022-07-14 NOTE — PROGRESS NOTES
"SUBJECTIVE:  Rashmi Oliva is a  at 21w4d gestation by 6 week US who returns today for prenatal care. Estimated Date of Delivery: 2022    - Concerns today: Poor weight gain.    Small portions at dinner.   Has been using Ensure. Breakfast bars.     - Patient reports no vaginal bleeding, no contractions/severe cramping, no leakage of fluid. Fetal movement is Present.   - No vaginal discharge. No dysuria.   - No headache, vision changes, lower extremity swelling, upper abdominal pain, chest pain, shortness of breath.   - Mood has been fine.    Rashmi Oliva speaks English so an  was not used today.    OBJECTIVE:  /77   Pulse 72   Temp 97.9  F (36.6  C) (Oral)   Resp 20   Ht 1.575 m (5' 2\")   Wt 82.6 kg (182 lb)   LMP 2022   SpO2 99%   BMI 33.29 kg/m    Const: No distress  Abd: Gravid.   See flowsheet for fundal height, FHTs, edema     Labs today: No results found for any visits on 22.    ASSESSMENT/PLAN:  27 year old , 21w4d weeks of pregnancy with DARLENE of 2022, by Ultrasound,     Rashmi was seen today for prenatal care.    Diagnoses and all orders for this visit:    Encounter for supervision of other normal pregnancy in second trimester  -     Glucose tolerance, gest screen, 1 hour; Future  -     Miscellaneous Order for DME - ONLY FOR DME  -     Treponema Abs w Reflex to RPR and Titer; Future  -     Hemoglobin; Future    Low weight gain during pregnancy in second trimester  -     Glucose tolerance, gest screen, 1 hour; Future  -     Miscellaneous Order for DME - ONLY FOR DME  -     TSH with free T4 reflex; Future  -     Nutrition Referral; Future        Risk: High Risk     Date determined: 2022  - Pregnancy complicated by:  Poor weight gain, placed Dietician referral, TSH and hgb also checked today. Awaiting 1 hr GCT, if normal--will repeat at 26 weeks.  - Prenatal labs reviewed.   -Discussed screening for gestational diabetes. 1 hour GCT, second trimester " hemoglobin, and repeat syphilis screening will be obtained . Testing to be done at today. .     - Pregnancy weight gain has been -4.99 kg (-11 lb) to date, which is Low. Target weight gain is 11-20 lbs (pregravid BMI >30). Plan if excessive or low: See above, continue supplements TID, referral placed, GCT today..  - Counseled patient on topics as marked in the OB checklist.      - Contraception options after delivery: patient is interested in Combination OCP.    -Patient recommended to see OB educator/RN today and/or future visit for education and/or care coordination.    Return to clinic in 2 weeks.     Options for treatment and/or follow-up care were reviewed with the patient. Rashmi Oliva was engaged and actively involved in the decision making process. She verbalized understanding of the options discussed and was satisfied with the final plan.    Precepted with Dr. Ledbetter.  Caridad Alejo MD

## 2022-07-14 NOTE — RESULT ENCOUNTER NOTE
Per Call Center, they have relayed below message to Rashmi and she has opted to start checking bs at home. Will plan to bring in bs log at next appt 7/27/22. Ramiro BLACKMAN

## 2022-07-14 NOTE — PROGRESS NOTES
Preceptor Attestation:   Patient seen, evaluated and discussed with the resident. I have verified the content of the note, which accurately reflects my assessment of the patient and the plan of care.    Supervising Physician:Nallely Ledbetter MD    [facility]

## 2022-07-14 NOTE — PATIENT INSTRUCTIONS
Pregnancy Safe OTC Medications  Over the counter medications should be used sparingly and as directed. It is important to read the label to confirm ALL ingredients prior to use. If symptoms persist or worsen, call our office.     Allergies  Avoid Sudafed (especially in the 1st trimester).  Diphenhydramine (Benadryl )  Loratidine (Claritin )  Cetirizine (Zyrtec )    Cold/Flu  Rest, increase fluids, air humidifier. Do not take Sudafed, Oxymetazoline (Afrin ) nasal spray, or products that contain alcohol.  Saline nasal drops or spray  Warm salt/water gargle  Santos's VaporRub    Halls'( ) cough drops, Cepacol ( ) lozenges  Dextromethorphan (Robitussin )  Guaifenesin (Mucinex )     Constipation  Increase dietary fibers (fruits, vegetables, grains), fluids and exercise.  Fiber supplement (Metamucil , Fiberall , FiberCon )  Polyethylene glycol (MiraLAX )  Milk of Magnesia   Docusate (Colace , Fatimah-Colace )  Senna (Senokot , Fatimah-Colace )    Diarrhea  Increase fluids, follow a bland diet. Avoid Imodium  and Lomotil .     Gas  Simethicone (Gas-X , Mylicon , Mylanta Gas )    Heartburn/Indigestion  Do not recline after meals. Try to sleep with your head elevated. Do not take PeptoBismol or Kaylin-seltzer  Calcium carbonate (Tums , Rolaids )  Aluminum hydroxide and magnesium hydroxide (Maalox , Mylanta )  Famotidine (Pepcid AC )  Milk of magnesia    Hemorrhoids  Try sitz baths.   Tucks pads  Hydrocortisone cream  Topical phenylephrine (Preparation H )    Insomnia  Benadryl   Doxylamine    Nausea/Vomiting  Alba products  Alba capsules (250 mg up to 4 times daily)  Vitamin B6 10-25 mg up to 3 times per day  Doxylamine 1/2 tab (12.5 mg) of 25 mg tablet up to three times daily (can cause drowsiness)     Pain/Headache  Increase fluids. Do not take ibuprofen (Advil , Motrin ), naproxen (Aleve ), or aspirin unless advised by a provider (please note these are commonly combined in OTC products).  Acetaminophen (Tylenol ) - max dose  3000 mg per day    Yeast infection  Clotrimazole (Lotrimin 7 )  Miconazole (Monistat 7 )  Contact your physician to make sure you do not have a more serious infection. 7 day products are preferred over one or three day products.

## 2022-07-27 ENCOUNTER — OFFICE VISIT (OUTPATIENT)
Dept: FAMILY MEDICINE | Facility: CLINIC | Age: 27
End: 2022-07-27
Payer: COMMERCIAL

## 2022-07-27 VITALS
SYSTOLIC BLOOD PRESSURE: 122 MMHG | TEMPERATURE: 98.5 F | HEART RATE: 83 BPM | RESPIRATION RATE: 20 BRPM | WEIGHT: 184 LBS | DIASTOLIC BLOOD PRESSURE: 75 MMHG | OXYGEN SATURATION: 98 % | HEIGHT: 62 IN | BODY MASS INDEX: 33.86 KG/M2

## 2022-07-27 DIAGNOSIS — O09.90 HIGH-RISK PREGNANCY, UNSPECIFIED TRIMESTER: Primary | ICD-10-CM

## 2022-07-27 DIAGNOSIS — O26.12 LOW WEIGHT GAIN DURING PREGNANCY IN SECOND TRIMESTER: ICD-10-CM

## 2022-07-27 DIAGNOSIS — O24.419 GESTATIONAL DIABETES MELLITUS (GDM) IN SECOND TRIMESTER, GESTATIONAL DIABETES METHOD OF CONTROL UNSPECIFIED: ICD-10-CM

## 2022-07-27 PROCEDURE — 99207 PR PRENATAL VISIT: CPT | Mod: GC | Performed by: STUDENT IN AN ORGANIZED HEALTH CARE EDUCATION/TRAINING PROGRAM

## 2022-07-27 NOTE — PATIENT INSTRUCTIONS
What is Gestational Diabetes (GDM)?  Diabetes is a condition where there is too much sugar in your blood. This happens because insulin--a hormone responsible for moving sugar into cells--becomes less effective. GDM is a type of diabetes that develops during pregnancy. In pregnancy, the placenta creates hormones that can make insulin less effective, leading to diabetes.  Risks for Mom: increased risk of premature labor, general infections, pre-eclampsia, urinary tract infection,   Risks for Baby: increased risk of larger babies (>9 pounds), low blood sugar, shoulder injury, jaundice    What can we do? This is a very manageable condition, and generally resolves after pregnancy. We will work with you to ensure you get the care you need to make your pregnancy as low risk as possible.    What do I do next?  Receive education from a clinic nurse   supplies, including: blood sugar monitoring kit, test strips, lancets  Check your blood sugar 4 times per day: morning/fasting, before each meal and 1-2 hours after meals.  Track sugar values in log book  See your doctor in 1 week to check values and discuss treatment, some women require medication to manage their GDM while others are able to control it with diet and exercise.  Continue your personalized plan    How do I take care of my baby and myself?  Treatment always includes healthy eating, daily physical activity, checking your blood glucose at home, and sometimes involves medications.    Healthy eating:   Increasing fresh lean meats and vegetables  Decreasing simple sugars and processed foods  Avoiding all fruit juices and full sugar pop.  It is important to eat regularly and maintain a good caloric intake, managing nausea/vomiting associated with pregnancy is very important.    Physical activity:   Being active for 10-15 minutes per day can lower blood sugar levels  Choose an activity you enjoy, split it up throughout the day so it fits into your  schedule  Ideas for activity: walking, dancing, yoga, playing with kids.    Checking your blood sugar:  Wash and dry your hands  Poke the side of one finger tip  Use a different finger each time  Track in your log book    When will I need medication?  Only if sugars are not met with diet and exercise  This will be discussed with your doctor during weekly visits     When should I call my care team?  Blood sugars over 200 for more than a day, and/or symptoms of vomiting/diarrhea, feeling overly sleepy/foggy, or having trouble breathing.    Information per [facility] Handouts.    Goals for blood sugar levels:  Morning blood sugar: <95 mg/dL (5.3 mmol/L)  One-hour after eating:    <140 mg/dL (7.8 mmol/L)  Two-hour after eating:: <120 mg/dL (6.7 mmol/L)

## 2022-07-27 NOTE — PROGRESS NOTES
"SUBJECTIVE:  Rashmi Oliva is a  at 23w3d gestation by 6 week US who returns today for prenatal care. Estimated Date of Delivery: 2022    - Concerns today:   GDM, first visit since diagnosis. Failed 1 hr, with hx, opted to start BG monitoring and treating as GDM     1 hour before eating at 5    1 hour postprandial 150 *   2 hours postprand 197 *   4 hours postprand 84   2 hours postprand 117   2 hours postprand 152 *   fasting 104 *    Goals:  Fasting blood glucose concentration: <95 mg/dL (5.3 mmol/L)  One-hour postprandial blood glucose concentration: <140 mg/dL (7.8 mmol/L)  Two-hour postprandial glucose concentration: <120 mg/dL (6.7 mmol/L)    Juice 1 cup, [place] instant breakfast daily.  Otherwise eats plenty of fruits and veggies.    - Patient reports no vaginal bleeding, no contractions/severe cramping, no leakage of fluid. Fetal movement is Present.   - No nausea/vomiting. No heartburn.   - No vaginal discharge. No dysuria.   - No headache, vision changes, lower extremity swelling, upper abdominal pain, chest pain, shortness of breath.   - Mood has been fine, mildly irritation.      Rashmi Oliva speaks English so an  was not used today.    OBJECTIVE:  /75 (BP Location: Right arm, Patient Position: Sitting, Cuff Size: Adult Regular)   Pulse 83   Temp 98.5  F (36.9  C) (Oral)   Resp 20   Ht 1.575 m (5' 2\")   Wt 83.5 kg (184 lb)   LMP 2022   SpO2 98%   BMI 33.65 kg/m    Const: No distress  Abd: Gravid.   See flowsheet for fundal height, FHTs, edema     Labs today: No results found for any visits on 22.    ASSESSMENT/PLAN:  27 year old , 23w3d weeks of pregnancy with DARLENE of 2022, by Ultrasound,     Rashmi was seen today for prenatal care and medication reconciliation.    Diagnoses and all orders for this visit:    High-risk pregnancy, unspecified trimester  Pregnancy complicated by GDM.    Gestational diabetes " mellitus (GDM) in second trimester, gestational diabetes method of control unspecified  Plan to follow BG for the next 1-2 weeks, currently several BG not at goal. Extensive diet and exercise education today. Plan to follow closely. Will likely need to start insulin, previously needed this in last pregnancy. As weight is stabilized, plan to cut back on supplements as they have high glucose content.  -     blood glucose (NO BRAND SPECIFIED) lancets standard; Use to test blood sugar 4 times daily or as directed.  -     blood glucose monitoring (NO BRAND SPECIFIED) meter device kit; Use to test blood sugar 4 times daily or as directed.  -     blood glucose (NO BRAND SPECIFIED) test strip; Use to test blood sugar 4 times daily or as directed.    Low weight gain during pregnancy in second trimester  Slowly improving, up 2 lbs today!      Risk: High Risk   Date determined: 7/27/2022  - Pregnancy complicated by: GDM, poor weight gain.  - Prenatal labs reviewed.   - Fetal survey showed the following abnormalities: none; a follow up ultrasound is not required.    - Plan for Tdap at 27 weeks.    - Pregnancy weight gain has been -4.082 kg (-9 lb) to date, which is low but improving, see above, treating GDM.  - Counseled patient on topics as marked in the OB checklist.   - Patient will continue taking prenatal vitamins and avoiding cigarettes & alcohol.     - Contraception options after delivery: patient is interested in Combination OCP.    -Patient recommended to see OB educator/RN today and/or future visit for education and/or care coordination.    Return to clinic in 2 weeks.     Options for treatment and/or follow-up care were reviewed with the patient. Rashmi Oliva was engaged and actively involved in the decision making process. She verbalized understanding of the options discussed and was satisfied with the final plan.    Precepted with Dr. Pérez.    Caridad Alejo MD

## 2022-07-28 NOTE — PROGRESS NOTES
Preceptor Attestation:  Patient's case reviewed and discussed with Caridad Alejo MD resident and I evaluated the patient. I agree with written assessment and plan of care.  Supervising Physician:  Pro Pérez MD, MD IBRAHIM  [facility]

## 2022-08-10 ENCOUNTER — OFFICE VISIT (OUTPATIENT)
Dept: FAMILY MEDICINE | Facility: CLINIC | Age: 27
End: 2022-08-10
Payer: COMMERCIAL

## 2022-08-10 VITALS
WEIGHT: 186 LBS | TEMPERATURE: 98.1 F | OXYGEN SATURATION: 98 % | DIASTOLIC BLOOD PRESSURE: 80 MMHG | BODY MASS INDEX: 34.23 KG/M2 | HEIGHT: 62 IN | SYSTOLIC BLOOD PRESSURE: 121 MMHG | HEART RATE: 99 BPM | RESPIRATION RATE: 22 BRPM

## 2022-08-10 DIAGNOSIS — O09.90 HIGH-RISK PREGNANCY, UNSPECIFIED TRIMESTER: Primary | ICD-10-CM

## 2022-08-10 DIAGNOSIS — O24.419 GESTATIONAL DIABETES MELLITUS (GDM) IN SECOND TRIMESTER, GESTATIONAL DIABETES METHOD OF CONTROL UNSPECIFIED: ICD-10-CM

## 2022-08-10 PROCEDURE — 99207 PR PRENATAL VISIT: CPT | Mod: GC | Performed by: STUDENT IN AN ORGANIZED HEALTH CARE EDUCATION/TRAINING PROGRAM

## 2022-08-10 RX ORDER — LANCETS
4 EACH MISCELLANEOUS DAILY
COMMUNITY
Start: 2022-07-27 | End: 2022-11-29

## 2022-08-10 NOTE — PROGRESS NOTES
"SUBJECTIVE:  Rashmi Oliva is a  at 25w3d gestation by 6 week US who returns today for prenatal care. Estimated Date of Delivery: 2022    - Concerns today: Gestational Diabetes    Goals:  Fasting blood glucose concentration: <95 mg/dL (5.3 mmol/L)  One-hour postprandial blood glucose concentration: <140 mg/dL (7.8 mmol/L)  Two-hour postprandial glucose concentration: <120 mg/dL (6.7 mmol/L)    Fast 132  Post prand 1 hr 124    BG fasting 105  1 post prand 135  2 hour post 145      1 hour lunch 100  2 hour dinner 148      1 hour lunch 118  2 hour dinner 128      Lunch 1 hour 89  2 hour 147      1 hour 144  2 hour 154    AM 89  1 hour 123  2 hour 130       1hour 137   2 hour 157    AM 87   125 1 hour  146 2 hour    116 AM  145 1 hour  127 2 hour      1 hour 132  2 hour 110        Recent death of her maternal grandma, will be traveling to [place] for  at the end of the month.    - Patient reports no vaginal bleeding, no contractions/severe cramping, no leakage of fluid. Contractions none. Fetal movement is Present.   - No nausea/vomiting. No heartburn.   - No vaginal discharge. No dysuria.   - No headache, vision changes, lower extremity swelling, upper abdominal pain, chest pain, shortness of breath.   - Mood has been fine.    Going to [facility]? Yes    Rashmi Oliva speaks English so an  was not used today.    OBJECTIVE:  /80   Pulse 99   Temp 98.1  F (36.7  C)   Resp 22   Ht 1.575 m (5' 2\")   Wt 84.4 kg (186 lb)   LMP 2022   SpO2 98%   BMI 34.02 kg/m    Const: No distress  Abd: Gravid.   \See flowsheet for fundal height, FHTs, edema, cervical exam.    Labs today: No results found for any visits on 08/10/22.    ASSESSMENT/PLAN:  27 year old , 25w3d weeks of pregnancy with DARLENE of 2022, by Ultrasound, confirmed by 6 week US.    Rashmi was seen today for prenatal care.    Diagnoses and all orders for this " visit:    High-risk pregnancy, unspecified trimester  -     insulin glargine (LANTUS PEN) 100 UNIT/ML pen; Inject 10 Units Subcutaneous At Bedtime  -     insulin pen needle (32G X 4 MM) 32G X 4 MM miscellaneous; Use 1 pen needles daily or as directed.  -     US OB Biophys Single Gestation Measure; Future    Gestational diabetes mellitus (GDM) in second trimester, gestational diabetes method of control unspecified  Starting 10 U lantus daily with many BG out of goal range. Counseled on continuing BG checks--fasting and postprandials. Will uptitrate at follow up as needed, could consider self titration with 2-3 U between visits at next visit. Will need weekly BPPs starting at 32 weeks.   -     insulin glargine (LANTUS PEN) 100 UNIT/ML pen; Inject 10 Units Subcutaneous At Bedtime  -     insulin pen needle (32G X 4 MM) 32G X 4 MM miscellaneous; Use 1 pen needles daily or as directed.  -     US OB Biophys Single Gestation Measure; Future        - Pregnancy complicated by: GDM, now on insulin--starting with 10 U lantus.     Plan for weekly BPP starting at 32 weeks.     - Prenatal labs reviewed.        - Contraception options after delivery: patient is interested in Combination OCP    - Pregnancy weight gain has been -3.175 kg (-7 lb) to date, which is low; now gaining weight, suspect poor weight gain to be due to hyperemesis  in 1st trimester and GDM early onset.      - Counseled patient on topics as marked in the OB checklist.  - Patient will continue taking prenatal vitamins and avoiding cigarettes & alcohol.     - Delivery plan completed with patient in the AVS.   -Patient recommended to see OB educator/RN today and/or future visit for education and/or care coordination.    - Return to clinic in 2 weeks.      Options for treatment and/or follow-up care were reviewed with the patient. Rashmi Oliva was engaged and actively involved in the decision making process. She verbalized understanding of the options discussed  and was satisfied with the final plan.    Precepted with Dr. Michel.    Caridad Alejo MD

## 2022-08-12 NOTE — PROGRESS NOTES
Preceptor Attestation:   Patient seen, evaluated and discussed with the resident. I have verified the content of the note, which accurately reflects my assessment of the patient and the plan of care.    Supervising Physician:Teto Michel    [facility]

## 2022-08-18 ENCOUNTER — OFFICE VISIT (OUTPATIENT)
Dept: FAMILY MEDICINE | Facility: CLINIC | Age: 27
End: 2022-08-18
Payer: COMMERCIAL

## 2022-08-18 VITALS
HEIGHT: 63 IN | SYSTOLIC BLOOD PRESSURE: 110 MMHG | HEART RATE: 84 BPM | DIASTOLIC BLOOD PRESSURE: 69 MMHG | RESPIRATION RATE: 24 BRPM | BODY MASS INDEX: 32.43 KG/M2 | WEIGHT: 183 LBS | TEMPERATURE: 98.2 F | OXYGEN SATURATION: 99 %

## 2022-08-18 DIAGNOSIS — O09.90 HIGH-RISK PREGNANCY, UNSPECIFIED TRIMESTER: Primary | ICD-10-CM

## 2022-08-18 DIAGNOSIS — O26.12 LOW WEIGHT GAIN DURING PREGNANCY IN SECOND TRIMESTER: ICD-10-CM

## 2022-08-18 DIAGNOSIS — O24.419 GESTATIONAL DIABETES MELLITUS (GDM) IN SECOND TRIMESTER, GESTATIONAL DIABETES METHOD OF CONTROL UNSPECIFIED: ICD-10-CM

## 2022-08-18 PROCEDURE — 99207 PR PRENATAL VISIT: CPT | Mod: GC | Performed by: STUDENT IN AN ORGANIZED HEALTH CARE EDUCATION/TRAINING PROGRAM

## 2022-08-18 RX ORDER — SYRINGE-NEEDLE,INSULIN,0.5 ML 27GX1/2"
SYRINGE, EMPTY DISPOSABLE MISCELLANEOUS
Qty: 100 EACH | Refills: 0 | Status: CANCELLED | OUTPATIENT
Start: 2022-08-18

## 2022-08-18 RX ORDER — INSULIN GLARGINE 100 [IU]/ML
10 INJECTION, SOLUTION SUBCUTANEOUS AT BEDTIME
Qty: 10 ML | Refills: 0 | Status: CANCELLED | OUTPATIENT
Start: 2022-08-18

## 2022-08-18 NOTE — PROGRESS NOTES
SUBJECTIVE:  Rashmi Oliva is a  at 26w4d gestation by 6 week US who returns today for prenatal care. Estimated Date of Delivery: 2022    - Concerns today: Insurance not covering her insulin. Very stressed at work.   [employer] insurance- was covered last pregnancy with insulin.     Clarified with Pharmacy, now lantus pens are ready, will start 10 U tonight.    Has had 3 lb weight loss, attributes to work being very stressful--wonders if cutting back at work would be helpful    - Patient reports no vaginal bleeding, no contractions/severe cramping, no leakage of fluid. Fetal movement is Present.   - No nausea/vomiting. No heartburn.   - No vaginal discharge. No dysuria.   - No headache, vision changes, lower extremity swelling, upper abdominal pain, chest pain, shortness of breath.   - Mood has been anxious. Tearful in the appointment.     BG log: None at goal.  Fasting 100  1 hr 156  2hr 123    Fasting 97  1 hour 148  2 hr 133    Fasting 123  1 hour 139  2 hour 159    Fasting 134  1 hr 178  2 hr 122    Fasting 104  1hr 133  2 hour 142    Fastin 121  1hr 149  2 hr 157    Fasting 106  1 hr 154  2 hr 144      Rashmi Oliva speaks English so an  was not used today.    -insurance not covering insulin- need to call them again  -going to  this weekend  -stressed at work- having to take on too much work      Risk Assessment    At Risk Category (up to 3)  Teen pregnancy: No  Poor social situation: No  Domestic abuse: No  Financial difficulties: No  Smoker: No; history of smoking   H/O  delivery: No  H/O drug abuse: No  Non-English speaking: No  Advanced maternal age: No  GDM risks: Yes  Previous C/S: No  H/O PIH: No  H/O STIs: No  H/O mental health concerns: No; history of PTSD and depression--not currently active  Onset care > 20 weeks: No    High Risk Category (4 or more At Risk or)  Diabetes/GDM: Yes  Multiple gestation: No  Chronic hypertension: No  Significant hx of asthma: No  Fetal demise  "> 20 weeks: No  Positive tox screen: No  Current mental health treatment: No    OBJECTIVE:  /69   Pulse 84   Temp 98.2  F (36.8  C) (Oral)   Resp 24   Ht 1.6 m (5' 3\")   Wt 83 kg (183 lb)   LMP 2022   SpO2 99%   BMI 32.42 kg/m    Const: No distress  Abd: Gravid.   See flowsheet for fundal height, FHTs, edema     Labs today: No results found for any visits on 22.    ASSESSMENT/PLAN:  27 year old , 26w4d weeks of pregnancy with DARLENE of 2022, by Ultrasound,     Rashmi was seen today for prenatal care.    Diagnoses and all orders for this visit:    High-risk pregnancy, unspecified trimester    Gestational diabetes mellitus (GDM) in second trimester, gestational diabetes method of control unspecified   Plan to start lantus tonight as cleared with pharmacy--next will be to increase as needed, follow up within 5 days.    Low weight gain during pregnancy in second trimester        Risk: High Risk   Date determined: 2022  - Pregnancy complicated by: Poor weight gain, GDM uncontrolled--starting insulin.    - Prenatal labs reviewed.     - Pregnancy weight gain has been -4.536 kg (-10 lb) to date, which is inadequate, working on getting insulin.  - Counseled patient on topics as marked in the OB checklist.  - Patient will continue taking prenatal vitamins and avoiding cigarettes & alcohol.       - Contraception options after delivery: patient is interested in Combination OCP.    -Patient recommended to see OB educator/RN today and/or future visit for education and/or care coordination.    Return to clinic in 1 week.     Options for treatment and/or follow-up care were reviewed with the patient. Rashmi Oliva was engaged and actively involved in the decision making process. She verbalized understanding of the options discussed and was satisfied with the final plan.    Precepted with Dr. Ledbetter.  Caridad Alejo MD              "

## 2022-08-18 NOTE — LETTER
[facility] [address] [place] [address]  Phone: [phone]  Fax: [phone]    August 18, 2022        Rashmi Oliva  [address] [place] [address]          To whom it may concern:    RE: Rashmi Oliva    Patient was seen and treated today at our clinic.    Rashmi requires increased flexibility in her work. Due to her high risk pregnancy, she requires a decreased work load to accommodate a healthy pregnancy.    At this time it is recommended she cut back to half time. Please send appropriate paper work for this request to be accommodated.    Please contact me for questions or concerns.      Sincerely,        Caridad Alejo MD

## 2022-08-23 ENCOUNTER — OFFICE VISIT (OUTPATIENT)
Dept: FAMILY MEDICINE | Facility: CLINIC | Age: 27
End: 2022-08-23
Payer: COMMERCIAL

## 2022-08-23 ENCOUNTER — TELEPHONE (OUTPATIENT)
Dept: FAMILY MEDICINE | Facility: CLINIC | Age: 27
End: 2022-08-23

## 2022-08-23 VITALS
DIASTOLIC BLOOD PRESSURE: 80 MMHG | WEIGHT: 186 LBS | SYSTOLIC BLOOD PRESSURE: 123 MMHG | OXYGEN SATURATION: 96 % | BODY MASS INDEX: 35.12 KG/M2 | RESPIRATION RATE: 18 BRPM | HEART RATE: 90 BPM | HEIGHT: 61 IN

## 2022-08-23 DIAGNOSIS — O24.419 GESTATIONAL DIABETES MELLITUS (GDM) IN SECOND TRIMESTER, GESTATIONAL DIABETES METHOD OF CONTROL UNSPECIFIED: ICD-10-CM

## 2022-08-23 DIAGNOSIS — O26.12 LOW WEIGHT GAIN DURING PREGNANCY IN SECOND TRIMESTER: ICD-10-CM

## 2022-08-23 DIAGNOSIS — O09.90 HIGH-RISK PREGNANCY, UNSPECIFIED TRIMESTER: Primary | ICD-10-CM

## 2022-08-23 PROCEDURE — 90471 IMMUNIZATION ADMIN: CPT | Performed by: STUDENT IN AN ORGANIZED HEALTH CARE EDUCATION/TRAINING PROGRAM

## 2022-08-23 PROCEDURE — 99207 PR PRENATAL VISIT: CPT | Mod: GC | Performed by: STUDENT IN AN ORGANIZED HEALTH CARE EDUCATION/TRAINING PROGRAM

## 2022-08-23 PROCEDURE — 90715 TDAP VACCINE 7 YRS/> IM: CPT | Performed by: STUDENT IN AN ORGANIZED HEALTH CARE EDUCATION/TRAINING PROGRAM

## 2022-08-23 NOTE — PATIENT INSTRUCTIONS
So increase your insulin to 12 U. If you are having blood sugars that are out goal on Saturday, then increase to 15 U everyday and continue checking your blood sugars.    GOALS:  Fasting blood glucose concentration: <95   One-hour after lunch blood glucose concentration: <140   Two-hour after dinner glucose concentration: <120

## 2022-08-23 NOTE — PROGRESS NOTES
Preceptor Attestation:  Patient's case reviewed and discussed with the resident, Caridad Alejo MD, and I personally evaluated the patient. I agree with written assessment and plan of care.    Supervising Physician:  Ailyn Mendieta MD   [facility]

## 2022-08-23 NOTE — PROGRESS NOTES
"SUBJECTIVE:  Rashmi Oliva is a  at 27w2d gestation by 6 week US who returns today for prenatal care. Estimated Date of Delivery: 2022    - Patient reports no vaginal bleeding, no contractions/severe cramping, no leakage of fluid. Fetal movement is Present.   - No nausea/vomiting. No heartburn.   - No vaginal discharge. No dysuria.   - No headache, vision changes, lower extremity swelling, upper abdominal pain, chest pain, shortness of breath.   - Mood has been fine.    Current Concerns:\  Work  Poor Weight Gain  FMLA paperwork. Having a lot of trouble with working so much.   Currently working overtime most weeks. Contributing to poor weight gain.  Would benefit from decreased work hours but difficulty with qualifying for disability/having to use PTO  Currently not qualifying for bed rest    Weight gain 3 lbs over the last week.  Fundal height is growing, now 30 cm at 27 weeks--plan for growth US at next visit.    GDM  Started lantus 10 U last week. Tolerating well    Fasting 108  1 hr 147  [id number]    Fasting 101  1 hr 149  2 hr 156    Fasting 98  1 hr 134  2 hr 125      Fasting 89  1 hr 130  2 hr 146    Fasting 101  Feeling sick had BG of 83. Did eat after low BG.  2 hr 224 with heartburn    Fasting 101  1 hr 161    Traveling Friday for grandmother's  in [place]  Will be back next Tuesday.    Rashmi Oliva speaks English so an  was not used today.  OBJECTIVE:  /80   Pulse 90   Resp 18   Ht 1.549 m (5' 1\")   Wt 84.4 kg (186 lb)   LMP 2022   SpO2 96%   BMI 35.14 kg/m    Const: No distress  Abd: Gravid.   See flowsheet for fundal height, FHTs, edema     Labs today: No results found for any visits on 22.    ASSESSMENT/PLAN:  27 year old , 27w2d weeks of pregnancy with DARLENE of 2022, by Ultrasound,     Rashmi was seen today for prenatal care.    Diagnoses and all orders for this visit:    High-risk pregnancy, unspecified trimester  -     US OB > 14 Weeks; " Future    Gestational diabetes mellitus (GDM) in second trimester, gestational diabetes method of control unspecified  Planning to increase lantus to 12 U and self titrate to 15 U if continues to have elevated BG throughout the week.   -     US OB > 14 Weeks; Future    Low weight gain during pregnancy in second trimester  Low threshold to refer to OB at next visit.   -     US OB > 14 Weeks; Future    Other orders  -     TDAP VACCINE (Adacel, Boostrix)  [[id number]]          Risk: High Risk   Date determined: 8/23/2022  - Pregnancy complicated by: GDM, poor weight gain.   - Prenatal labs reviewed.     - (After 27 weeks) Discussed need for Tdap, patient did agree to vaccination.     - Pregnancy weight gain has been -3.175 kg (-7 lb) to date, which is low. Continued observation. Plan to obtain US for fetal growth next week; will consider OB referral at that point. Does have dietician appt on 8/31.    - Counseled patient on topics as marked in the OB checklist.        - Contraception options after delivery: patient is interested in Combination OCP.    -Patient recommended to see OB educator/RN today and/or future visit for education and/or care coordination.    Return to clinic in 1 weeks.     Options for treatment and/or follow-up care were reviewed with the patient. Rashmi Pj was engaged and actively involved in the decision making process. She verbalized understanding of the options discussed and was satisfied with the final plan.    Precepted with Dr. Mendieta.     Caridad Alejo MD

## 2022-08-23 NOTE — TELEPHONE ENCOUNTER
"Called patient in regards to clinic page \"not feeling well\"    Patient is a 28 yo , 27w2d via early US. Her pregnancy is complicated by GDM, controlled with insulin and poor weight gain.    She was seen in clinic earlier today, feeling well per chart review.    Now patient endorses: feeling off, states that she has felt this way once yesterday and now is feeling similar. She describes it as nauseated with heartburn, but does not have vomiting. States her stomach just \"feels off\".    Feeling baby move- yes  LOF- no  Vaginal bleeding- no  Fevers/chills? no  Abnormal discharge, dysuria, itchiness below? No, does feel pressure when she urinates  Glucose level?- 83 yesterday during episode, 82 today- both resolved after eating something    Denies: HA/ vision changes/ lower extremity edema/ upper abdominal pain/ chest pain/ shortness of breath    Per chart review, vitals were wnl today at the clinic. Exam was normal.    Overall, reassuring picture. No concerns for early labor. Could be related to relative low blood sugar.     Advised to try and eat something and recheck sugar to evaluate if symptoms subside. Also see Zofran (prescribed earlier in pregnancy) is available if she should need it. Advised to call back after dinner if this isn't helping.     Additionally, advised on reasons to call back or come in to be evaluated including:  Not feeling baby move, vaginal bleeding, leakage of fluid, regular contractions, developing fever/chills, abdominal pain, vomiting, dysuria etc.     Patient has an appointment in clinic , suggested we could try and get her in earlier this week/next week.    Juliana Zimmerman MD PGY2  [facility] Medicine Residency   Pager #: [phone]      "

## 2022-08-30 ENCOUNTER — VIRTUAL VISIT (OUTPATIENT)
Dept: NUTRITION | Facility: CLINIC | Age: 27
End: 2022-08-30
Payer: COMMERCIAL

## 2022-08-30 DIAGNOSIS — O26.12 LOW WEIGHT GAIN DURING PREGNANCY IN SECOND TRIMESTER: Primary | ICD-10-CM

## 2022-08-30 PROCEDURE — 97802 MEDICAL NUTRITION INDIV IN: CPT | Mod: 95 | Performed by: DIETITIAN, REGISTERED

## 2022-08-30 NOTE — Clinical Note
Good Afternoon, I saw Rashmi today for low weight gain during pregnancy. We discussed healthy eating for pregnancy and also for gestational diabetes. Recommended that Rashmi include carbohydrates and protein at each meal and snack. Recommended that she include a snack between each meal. We didn't schedule a follow-up, but please let us know if you would like me to see her again.  Chitra Amin RD [facility]

## 2022-08-30 NOTE — PROGRESS NOTES
Medical Nutrition Therapy  Visit Type:Initial assessment and intervention    Type of Service: Telephone Visit    Originating Location (Patient Location): Home  Distant Location (Provider Location): Home  Mode of Communication:  Telephone    Telephone Visit Start Time: 10:00  Telephone Visit End Time (telephone visit stop time): 10:40    How would patient like to obtain AVS? Patsy      Rashmi Oliva presents today for MNT and education related to low weight gain during pregnancy.   She is accompanied by self.     ASSESSMENT:   Patient comments/concerns relating to nutrition: rashmi states that she has started taking insulin, now taking 15 units. She states that this is her 3rd pregnancy and didn't have any trouble gaining weight with any of her other pregnancies. She states that she had some nausea in her first trimester, but this has improved. She has had some food aversions which is limiting her diet, especially meats that are baked or grilled.     Fasting BG 96, 1hr after lunch: 134, 1 hour after dinner: 140  104, 140, 117    NUTRITION HISTORY:    Breakfast: Yogurt, OR breakfast burrito  Lunch: at work- ordering in- honey walnut shrimp with fried rice OR chicken strips and french fries   Dinner: cooked at home- Rice with Ribs or baked chicken OR stir winkler with rice   Snacks: Fruits - watermelons, chips, carnation instant breakfast and ensure -1/day,   Beverages: drinking a lot of water, 1 soda/few weeks (diet),     Misses meals? Sometimes, might skip lunch at work, or only be able to have a few bites.  Eats out:  5 meals/per week (lunch at work)    Previous diet education:  Yes     Food allergies/intolerances: bananas, dairy    EXERCISE: a lot of walking    SOCIO/ECONOMIC:   Lives with: self and children    MEDICATIONS:  Current Outpatient Medications   Medication     blood glucose (NO BRAND SPECIFIED) lancets standard     blood glucose (NO BRAND SPECIFIED) test strip     blood glucose monitoring (NO BRAND SPECIFIED)  meter device kit     blood glucose monitoring (SOFTCLIX) lancets     famotidine (PEPCID) 20 MG tablet     insulin glargine (LANTUS PEN) 100 UNIT/ML pen     insulin pen needle (32G X 4 MM) 32G X 4 MM miscellaneous     ondansetron (ZOFRAN ODT) 4 MG ODT tab     Prenatal Vit-Fe Fumarate-FA (PRENATAL MULTIVITAMIN W/IRON) 27-0.8 MG tablet     pyridOXINE (VITAMIN B6) 25 MG tablet     No current facility-administered medications for this visit.       LABS:  Last Basic Metabolic Panel:  No results found for: NA   No results found for: POTASSIUM  No results found for: CHLORIDE  No results found for: JACOB  No results found for: CO2  No results found for: BUN  Lab Results   Component Value Date    CR 0.64 03/17/2019     Lab Results   Component Value Date     07/08/2022    GLC 86 03/26/2019       ANTHROPOMETRICS:  Vitals: LMP 01/28/2022   There is no height or weight on file to calculate BMI.      Wt Readings from Last 5 Encounters:   08/23/22 84.4 kg (186 lb)   08/18/22 83 kg (183 lb)   08/10/22 84.4 kg (186 lb)   07/27/22 83.5 kg (184 lb)   07/14/22 82.6 kg (182 lb)       Weight Change:   194 pre pregnancy weight  186 at last visit, 185 at home yesterday   Measuring at 29 weeks at last measurement (at 27 weeks)     NUTRITION DIAGNOSIS: Food- and nutrition-related knowledge deficit related to limited previous nutrition education as evidenced by patient statement and new referral    NUTRITION INTERVENTION:  Education given to support: general nutrition guidelines and healthy eating during pregnancy, meal planning for GDM, carbohydrate counting    PATIENT'S BEHAVIOR CHANGE GOALS:   See Patient Instructions for patient stated behavior change goals. AVS was printed and given to patient at today's appointment.    MONITOR / EVALUATE:  RD will monitor/evaluate:  Progress toward meeting stated nutrition-related goals  Readiness to change nutrition-related behaviors  Weight change    FOLLOW-UP:  Follow up with RD as needed.  Call  RD with questions/concerns.     Chitra Amin RD LD [facility]  Time spent in minutes: 40  Encounter: Individual

## 2022-09-03 ENCOUNTER — HEALTH MAINTENANCE LETTER (OUTPATIENT)
Age: 27
End: 2022-09-03

## 2022-09-08 ENCOUNTER — OFFICE VISIT (OUTPATIENT)
Dept: FAMILY MEDICINE | Facility: CLINIC | Age: 27
End: 2022-09-08
Payer: COMMERCIAL

## 2022-09-08 VITALS
WEIGHT: 187 LBS | BODY MASS INDEX: 34.41 KG/M2 | DIASTOLIC BLOOD PRESSURE: 73 MMHG | SYSTOLIC BLOOD PRESSURE: 111 MMHG | RESPIRATION RATE: 20 BRPM | TEMPERATURE: 97.9 F | OXYGEN SATURATION: 99 % | HEIGHT: 62 IN | HEART RATE: 84 BPM

## 2022-09-08 DIAGNOSIS — O26.12 LOW WEIGHT GAIN DURING PREGNANCY IN SECOND TRIMESTER: ICD-10-CM

## 2022-09-08 DIAGNOSIS — O24.419 GESTATIONAL DIABETES MELLITUS (GDM) IN SECOND TRIMESTER, GESTATIONAL DIABETES METHOD OF CONTROL UNSPECIFIED: ICD-10-CM

## 2022-09-08 DIAGNOSIS — O09.90 HIGH-RISK PREGNANCY, UNSPECIFIED TRIMESTER: Primary | ICD-10-CM

## 2022-09-08 PROCEDURE — 99207 PR PRENATAL VISIT: CPT | Mod: GC | Performed by: STUDENT IN AN ORGANIZED HEALTH CARE EDUCATION/TRAINING PROGRAM

## 2022-09-08 NOTE — PROGRESS NOTES
"SUBJECTIVE:  Rashmi Oliva is a  at 29w4d gestation by 6 week US who returns today for prenatal care. Estimated Date of Delivery: 2022    - Concerns today: GDM    GDM Glucose Monitoring Goals:  Fasting blood glucose concentration: <95 mg/dL (5.3 mmol/L)  One-hour postprandial blood glucose concentration: <140 mg/dL (7.8 mmol/L)  Two-hour postprandial glucose concentration: <120 mg/dL (6.7 mmol/L)    Starting on 12 U    Fastin, 94, 100, 92, 103, 94, 88, 94, 109  1 hour post prandials: 136, 134,138  2 hour post prandials: 140, 125, 122, 121    Then up titrated to 15 U  Fastin, 102, 100, 97, 82*, 99, 103  1 hour post prandials: 134, 140, 142, 128, 143, 133, 124, 134, 94  2 hour post prandials: 122, 117, 112, 128, 110, 122, 133    When >85 having symptoms of low BG.  Generally eats something and the symptoms stop after about 15 minutes.    - Patient reports no vaginal bleeding, no contractions/severe cramping, no leakage of fluid. Contractions none. Fetal movement is Present.    Some pelvic pressure, but no dysuria.    - No nausea/vomiting. No heartburn.   - No vaginal discharge. No dysuria.   - No headache, vision changes, lower extremity swelling, upper abdominal pain, chest pain, shortness of breath.     Did see dietician for poor weight gain, counseled on decreasing carbs and adding more proteins.    Rashmi Oliva speaks English so an  was not used today.    OBJECTIVE:  /73   Pulse 84   Temp 97.9  F (36.6  C)   Resp 20   Ht 1.575 m (5' 2\")   Wt 84.8 kg (187 lb)   LMP 2022   SpO2 99%   BMI 34.20 kg/m    Const: No distress  Abd: Gravid.   See flowsheet for fundal height, FHTs, edema, cervical exam.    Labs today: No results found for any visits on 22.    ASSESSMENT/PLAN:  27 year old , 29w4d weeks of pregnancy with DARLENE of 2022, by Ultrasound.    Rashmi was seen today for prenatal care and medication reconciliation.    Diagnoses and all orders for " this visit:    High-risk pregnancy, unspecified trimester    Gestational diabetes mellitus (GDM) in second trimester, gestational diabetes method of control unspecified   GDM still not at goal, on 15 U lantus, plan to increase to 17 U lantus and strongly consider referral to OB at next visit if BG not at goal.   US for growth today as fundal height is in upper limit of normal   Encouraged by increasing weight.     Low weight gain during pregnancy in second trimester   Some weight gain today, saw dietician last week, feeling appetite has returned, working on increasing protein/decreasing carbs.      - Pregnancy complicated by: GDM, low weight gain.  - Prenatal labs reviewed.      - (36+ weeks) Obtain GBS after 36 weeks    - Contraception options after delivery: patient is interested in Combination OCP    -Tdap and flu shot this pregnancy: up to date.  The patient did agree to vaccinations.     - Pregnancy weight gain has been -2.722 kg (-6 lb) to date, which is low, but stable from previous.   -Plan for growth ultrasound today.    - Counseled patient on topics as marked in the OB checklist.     -Patient recommended to see OB educator/RN today and/or future visit for education and/or care coordination.    - Return to clinic in 1 week.      Options for treatment and/or follow-up care were reviewed with the patient. Rashmi Oliva was engaged and actively involved in the decision making process. She verbalized understanding of the options discussed and was satisfied with the final plan.    Precepted with Dr. Ledbetter.    Caridad Alejo MD

## 2022-09-15 ENCOUNTER — OFFICE VISIT (OUTPATIENT)
Dept: FAMILY MEDICINE | Facility: CLINIC | Age: 27
End: 2022-09-15
Payer: COMMERCIAL

## 2022-09-15 VITALS
OXYGEN SATURATION: 97 % | HEIGHT: 61 IN | HEART RATE: 78 BPM | BODY MASS INDEX: 34.55 KG/M2 | SYSTOLIC BLOOD PRESSURE: 108 MMHG | WEIGHT: 183 LBS | DIASTOLIC BLOOD PRESSURE: 70 MMHG | TEMPERATURE: 97.9 F | RESPIRATION RATE: 20 BRPM

## 2022-09-15 DIAGNOSIS — O26.10 LOW MATERNAL WEIGHT GAIN, UNSPECIFIED TRIMESTER: ICD-10-CM

## 2022-09-15 DIAGNOSIS — O24.419 GESTATIONAL DIABETES MELLITUS (GDM) IN SECOND TRIMESTER, GESTATIONAL DIABETES METHOD OF CONTROL UNSPECIFIED: ICD-10-CM

## 2022-09-15 DIAGNOSIS — O09.90 HIGH-RISK PREGNANCY, UNSPECIFIED TRIMESTER: Primary | ICD-10-CM

## 2022-09-15 PROCEDURE — 99207 PR PRENATAL VISIT: CPT | Mod: GC | Performed by: STUDENT IN AN ORGANIZED HEALTH CARE EDUCATION/TRAINING PROGRAM

## 2022-09-15 NOTE — PROGRESS NOTES
"SUBJECTIVE:  Rashmi Oliva is a  at 30w4d gestation by 6 week US who returns today for prenatal care. Estimated Date of Delivery: 2022      - Patient reports no vaginal bleeding, no contractions/severe cramping, no leakage of fluid. Contractions none. Fetal movement is Present.   - No nausea/vomiting. No heartburn.   - No vaginal discharge. No dysuria.   - No headache, vision changes, lower extremity swelling, upper abdominal pain, chest pain, shortness of breath.   - Mood has been fine.    GDM: 17 U nightly  Blood sugars:    Fastings: 99, 87, 90, 83, 89,87,90  1 hour post prandials: 130, 126, 124, 122, 120, 129, 121  2 hour post prandials: 111, 107, 112, 115, 103, 112    Going to [facility]? Yes    More hungry in the morning, does have some bloating.    Has adjusted with work, working about 5 hours a day.    Rashmi Oliva speaks English so an  was not used today.    OBJECTIVE:  /70   Pulse 78   Temp 97.9  F (36.6  C) (Oral)   Resp 20   Ht 1.549 m (5' 1\")   Wt 83 kg (183 lb)   LMP 2022   SpO2 97%   BMI 34.58 kg/m    Const: No distress  Abd: Gravid.   See flowsheet for fundal height, FHTs, edema, cervical exam.    Labs today: No results found for any visits on 09/15/22.    ASSESSMENT/PLAN:  27 year old , 30w4d weeks of pregnancy with DARLENE of 2022, by Ultrasound, confirmed by 6 week US.    Rashmi was seen today for prenatal care.    Diagnoses and all orders for this visit:    High-risk pregnancy, unspecified trimester    Gestational diabetes mellitus (GDM) in second trimester, gestational diabetes method of control unspecified   -Continue lantus 17 U nightly, sugars at goal. Continue sugar checks as scheduled. Follow up 1 week   -Start BPP weekly at 32 weeks gestation    Low maternal weight gain, unspecified trimester   -Has met with RD.    -Stablizing since starting insulin   -Endorsing normal appetite   -Plan for weekly BPPs at 32 weeks gestation, reassured by normal " growth US last week, measuring appropriately.        - Pregnancy complicated by: GDM and poor weight gain--weight loss has stabilized since starting insulin--lower today. Will follow closely--has seen RD and will follow up as needed.  - Prenatal labs reviewed.      - Fetal survey showed the following abnormalities: normal growth US 1 week ago; plan for BPP weekly starting at 32 weeks gestation.    - Contraception options after delivery: patient is interested in Combination OCP    -Tdap and flu shot this pregnancy: up to date. Plan for flu shot at next visit.    - Pregnancy weight gain has been -4.536 kg (-10 lb) to date, see above. Target weight gain is 11-20 lbs (pregravid BMI >30).     - Delivery plan completed with patient in the AVS.    -Patient recommended to see OB educator/RN today and/or future visit for education and/or care coordination.    - Return to clinic in 1 week.      Options for treatment and/or follow-up care were reviewed with the patient. Rashmi Oliva was engaged and actively involved in the decision making process. She verbalized understanding of the options discussed and was satisfied with the final plan.    Precepted with Dr. Ha Alejo MD

## 2022-09-15 NOTE — PROGRESS NOTES
Preceptor Attestation:  Patient's case reviewed and discussed with Caridad Alejo MD resident and I evaluated the patient. I agree with written assessment and plan of care.  Supervising Physician:  DAQUAN GALLAGHER MD  [facility]

## 2022-09-26 ENCOUNTER — OFFICE VISIT (OUTPATIENT)
Dept: FAMILY MEDICINE | Facility: CLINIC | Age: 27
End: 2022-09-26
Payer: COMMERCIAL

## 2022-09-26 VITALS
SYSTOLIC BLOOD PRESSURE: 116 MMHG | DIASTOLIC BLOOD PRESSURE: 70 MMHG | RESPIRATION RATE: 16 BRPM | BODY MASS INDEX: 35.9 KG/M2 | HEART RATE: 72 BPM | WEIGHT: 190 LBS | OXYGEN SATURATION: 99 %

## 2022-09-26 DIAGNOSIS — O09.90 HIGH-RISK PREGNANCY, UNSPECIFIED TRIMESTER: Primary | ICD-10-CM

## 2022-09-26 DIAGNOSIS — R11.0 NAUSEA: ICD-10-CM

## 2022-09-26 DIAGNOSIS — O24.414 INSULIN CONTROLLED GESTATIONAL DIABETES MELLITUS (GDM) IN THIRD TRIMESTER: ICD-10-CM

## 2022-09-26 DIAGNOSIS — Z34.01 ENCOUNTER FOR SUPERVISION OF NORMAL FIRST PREGNANCY IN FIRST TRIMESTER: ICD-10-CM

## 2022-09-26 PROCEDURE — 99207 PR PRENATAL VISIT: CPT | Mod: GC | Performed by: STUDENT IN AN ORGANIZED HEALTH CARE EDUCATION/TRAINING PROGRAM

## 2022-09-26 RX ORDER — FAMOTIDINE 20 MG/1
20 TABLET, FILM COATED ORAL 2 TIMES DAILY
Qty: 90 TABLET | Refills: 3 | Status: SHIPPED | OUTPATIENT
Start: 2022-09-26 | End: 2022-11-29

## 2022-09-26 NOTE — PROGRESS NOTES
SUBJECTIVE:  Rashmi Oliva is a  at 32w1d gestation by 6 week US who returns today for prenatal care. 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 Contractions none. Fetal movement is Present.   - No nausea/vomiting. No heartburn.   - No vaginal discharge. No dysuria.   - No headache, vision changes, lower extremity swelling, upper abdominal pain, chest pain, shortness of breath.   - Mood has been fine.    17 U lantus    Fastin, 89, 91, 87, 90, 87, 87  1 hour post prandial: 133, 126, 134, 125, 134, 134, 123, 125  2 hour post prandial: 115, 118, 110, 116, 111, 118, 120    Going to [facility]? Yes    Rashmi Oliva speaks English so an  was not used today.    OBJECTIVE:  /70   Pulse 72   Resp 16   Wt 86.2 kg (190 lb)   LMP 2022   SpO2 99%   BMI 35.90 kg/m    Const: No distress  Abd: Gravid.   See flowsheet for fundal height, FHTs, edema, cervical exam.    Labs today: No results found for any visits on 22.    ASSESSMENT/PLAN:  27 year old , 32w1d weeks of pregnancy with DARLENE of 2022, by Ultrasound, confirmed by 6 week US.    Rashmi was seen today for prenatal care.    Diagnoses and all orders for this visit:    High-risk pregnancy, unspecified trimester  -     Cancel: US OB BIOPHYSICAL MULTIPLE; Future  -     US OB BIOPHYSICAL MULTIPLE; Standing    Insulin controlled gestational diabetes mellitus (GDM) in third trimester  -     Cancel: US OB BIOPHYSICAL MULTIPLE; Future  -     US OB BIOPHYSICAL MULTIPLE; Standing   --Plan for 17 U lantus daily   -Weekly BPP with NST starting this week.    Encounter for supervision of normal first pregnancy in first trimester  -     famotidine (PEPCID) 20 MG tablet; Take 1 tablet (20 mg) by mouth 2 times daily    Nausea  -     famotidine (PEPCID) 20 MG tablet; Take 1 tablet  (20 mg) by mouth 2 times daily      - Pregnancy complicated by: GDM--insulin controlled on 17 U lantus daily, poor weight gain--improving  - Prenatal labs reviewed.      - (36+ weeks) Obtain GBS after 36 weeks    - Contraception options after delivery: patient is interested in Combination OCP    -Tdap and flu shot this pregnancy: TDap up to date, flu shot planned for discussion at next visit.    - Pregnancy weight gain has been -1.361 kg (-3 lb) to date, improved.  - Counseled patient on topics as marked in the OB checklist.      -Patient recommended to see OB educator/RN today and/or future visit for education and/or care coordination.    - Return to clinic in 1 week.      Options for treatment and/or follow-up care were reviewed with the patient. Rashmi Oliva was engaged and actively involved in the decision making process. She verbalized understanding of the options discussed and was satisfied with the final plan.    Precepted with Dr. Mendieta.    Caridad Alejo MD

## 2022-09-29 ENCOUNTER — ANCILLARY PROCEDURE (OUTPATIENT)
Dept: ULTRASOUND IMAGING | Facility: CLINIC | Age: 27
End: 2022-09-29
Attending: FAMILY MEDICINE
Payer: COMMERCIAL

## 2022-09-29 ENCOUNTER — ALLIED HEALTH/NURSE VISIT (OUTPATIENT)
Dept: FAMILY MEDICINE | Facility: CLINIC | Age: 27
End: 2022-09-29

## 2022-09-29 DIAGNOSIS — O24.414 INSULIN CONTROLLED GESTATIONAL DIABETES MELLITUS (GDM) IN THIRD TRIMESTER: ICD-10-CM

## 2022-09-29 DIAGNOSIS — O09.90 HIGH-RISK PREGNANCY, UNSPECIFIED TRIMESTER: ICD-10-CM

## 2022-09-29 DIAGNOSIS — Z32.00 PREGNANCY EXAMINATION OR TEST, PREGNANCY UNCONFIRMED: Primary | ICD-10-CM

## 2022-09-29 PROCEDURE — 59025 FETAL NON-STRESS TEST: CPT

## 2022-09-29 PROCEDURE — 76819 FETAL BIOPHYS PROFIL W/O NST: CPT | Mod: TC | Performed by: RADIOLOGY

## 2022-10-05 ENCOUNTER — OFFICE VISIT (OUTPATIENT)
Dept: FAMILY MEDICINE | Facility: CLINIC | Age: 27
End: 2022-10-05
Payer: COMMERCIAL

## 2022-10-05 VITALS
BODY MASS INDEX: 33.84 KG/M2 | OXYGEN SATURATION: 98 % | WEIGHT: 191 LBS | SYSTOLIC BLOOD PRESSURE: 117 MMHG | TEMPERATURE: 98.1 F | HEIGHT: 63 IN | DIASTOLIC BLOOD PRESSURE: 74 MMHG | HEART RATE: 80 BPM | RESPIRATION RATE: 18 BRPM

## 2022-10-05 DIAGNOSIS — K21.00 GASTROESOPHAGEAL REFLUX DISEASE WITH ESOPHAGITIS WITHOUT HEMORRHAGE: ICD-10-CM

## 2022-10-05 DIAGNOSIS — O26.13 LOW WEIGHT GAIN DURING PREGNANCY IN THIRD TRIMESTER: ICD-10-CM

## 2022-10-05 DIAGNOSIS — O24.414 INSULIN CONTROLLED GESTATIONAL DIABETES MELLITUS (GDM) IN THIRD TRIMESTER: ICD-10-CM

## 2022-10-05 DIAGNOSIS — O09.90 HIGH-RISK PREGNANCY, UNSPECIFIED TRIMESTER: Primary | ICD-10-CM

## 2022-10-05 PROCEDURE — 99207 PR PRENATAL VISIT: CPT | Mod: GC | Performed by: STUDENT IN AN ORGANIZED HEALTH CARE EDUCATION/TRAINING PROGRAM

## 2022-10-05 PROCEDURE — 99213 OFFICE O/P EST LOW 20 MIN: CPT | Mod: GC | Performed by: STUDENT IN AN ORGANIZED HEALTH CARE EDUCATION/TRAINING PROGRAM

## 2022-10-05 RX ORDER — OMEPRAZOLE 20 MG/1
20 TABLET, DELAYED RELEASE ORAL DAILY
Qty: 60 TABLET | Refills: 1 | Status: SHIPPED | OUTPATIENT
Start: 2022-10-05 | End: 2022-10-12

## 2022-10-05 NOTE — PROGRESS NOTES
"SUBJECTIVE:  Rashmi Oliva is a  at 33w3d gestation by 6 week US who returns today for prenatal care. Estimated Date of Delivery: 2022    - Concerns today: GERD--has been successful with pepcid--feels she needs something stronger--generally needs PPI in third trimester.    Also having some pain in back and pelvis--generally with walking. Discussed trying belly band.    - Patient reports no vaginal bleeding, no contractions/severe cramping, no leakage of fluid. Contractions none. Fetal movement is Present.   - No nausea/vomiting.   - No vaginal discharge. No dysuria.   - No headache, vision changes, lower extremity swelling, upper abdominal pain, chest pain, shortness of breath.   - Mood has been fine.    17 U lantus  BG: Did not take them the last week, misunderstood and thought she could stop following.    Rashmi Oliva speaks English so an  was not used today.    OBJECTIVE:  /74   Pulse 80   Temp 98.1  F (36.7  C) (Oral)   Resp 18   Ht 1.6 m (5' 3\")   Wt 86.6 kg (191 lb)   LMP 2022   SpO2 98%   BMI 33.83 kg/m    Const: No distress  Abd: Gravid.   See flowsheet for fundal height, FHTs, edema, cervical exam.    Labs today: No results found for any visits on 10/05/22.    ASSESSMENT/PLAN:  27 year old , 33w3d weeks of pregnancy with DARLENE of 2022, by Ultrasound, confirmed by 6 week US.    Rashmi was seen today for prenatal care.    Diagnoses and all orders for this visit:    High-risk pregnancy, unspecified trimester    Insulin controlled gestational diabetes mellitus (GDM) in third trimester   Counseled on resuming following blood sugars as before--will follow up in 1 week.   No concern with taking 17 U lantus daily.   Weekly BPP w/ NST ordered, reassuring last week, next one scheduled for tomorrow.   Baby measuring slightly large fundal height, consistently 2 cm over, will obtain repeat growth US at 35 weeks.    Gastroesophageal reflux disease with esophagitis " without hemorrhage  -     omeprazole (PRILOSEC OTC) 20 MG EC tablet; Take 1 tablet (20 mg) by mouth daily    Low weight gain during pregnancy in third trimester        - Prenatal labs reviewed.      - Contraception options after delivery: patient is interested in Combination OCP    -Tdap up to date. Declines COVID and Flu immunization.    - Pregnancy weight gain has been -0.907 kg (-2 lb) to date, improving  - Patient will continue taking prenatal vitamins and avoiding cigarettes & alcohol.     - Delivery plan completed with patient in the AVS.   -Patient recommended to see OB educator/RN today and/or future visit for education and/or care coordination.    - Return to clinic in 1 week.      Options for treatment and/or follow-up care were reviewed with the patient. Rashmi Oliva was engaged and actively involved in the decision making process. She verbalized understanding of the options discussed and was satisfied with the final plan.    Precepted with Dr. Tomi Alejo MD

## 2022-10-05 NOTE — PROGRESS NOTES
Preceptor Attestation:   Patient seen, evaluated and discussed with the resident. I have verified the content of the note, which accurately reflects my assessment of the patient and the plan of care.  Supervising Physician:Amrik Krishna MD  [facility]

## 2022-10-06 ENCOUNTER — HOSPITAL ENCOUNTER (OUTPATIENT)
Dept: ULTRASOUND IMAGING | Facility: HOSPITAL | Age: 27
Discharge: HOME | End: 2022-10-06
Attending: FAMILY MEDICINE
Payer: COMMERCIAL

## 2022-10-06 ENCOUNTER — HOSPITAL ENCOUNTER (OUTPATIENT)
Facility: HOSPITAL | Age: 27
End: 2022-10-06
Admitting: FAMILY MEDICINE
Payer: COMMERCIAL

## 2022-10-06 DIAGNOSIS — O09.90 HIGH-RISK PREGNANCY, UNSPECIFIED TRIMESTER: ICD-10-CM

## 2022-10-06 DIAGNOSIS — O24.414 INSULIN CONTROLLED GESTATIONAL DIABETES MELLITUS (GDM) IN THIRD TRIMESTER: ICD-10-CM

## 2022-10-06 PROCEDURE — 76819 FETAL BIOPHYS PROFIL W/O NST: CPT

## 2022-10-12 ENCOUNTER — OFFICE VISIT (OUTPATIENT)
Dept: FAMILY MEDICINE | Facility: CLINIC | Age: 27
End: 2022-10-12
Payer: COMMERCIAL

## 2022-10-12 VITALS
HEART RATE: 68 BPM | WEIGHT: 188 LBS | SYSTOLIC BLOOD PRESSURE: 128 MMHG | HEIGHT: 62 IN | OXYGEN SATURATION: 98 % | RESPIRATION RATE: 20 BRPM | BODY MASS INDEX: 34.6 KG/M2 | DIASTOLIC BLOOD PRESSURE: 78 MMHG | TEMPERATURE: 98.7 F

## 2022-10-12 DIAGNOSIS — K21.00 GASTROESOPHAGEAL REFLUX DISEASE WITH ESOPHAGITIS WITHOUT HEMORRHAGE: ICD-10-CM

## 2022-10-12 DIAGNOSIS — O24.419 GESTATIONAL DIABETES MELLITUS (GDM) IN SECOND TRIMESTER, GESTATIONAL DIABETES METHOD OF CONTROL UNSPECIFIED: ICD-10-CM

## 2022-10-12 DIAGNOSIS — O09.90 HIGH-RISK PREGNANCY, UNSPECIFIED TRIMESTER: Primary | ICD-10-CM

## 2022-10-12 DIAGNOSIS — O26.13 LOW WEIGHT GAIN DURING PREGNANCY IN THIRD TRIMESTER: ICD-10-CM

## 2022-10-12 DIAGNOSIS — R30.0 DYSURIA: ICD-10-CM

## 2022-10-12 LAB
ALBUMIN UR-MCNC: NEGATIVE MG/DL
APPEARANCE UR: CLEAR
BACTERIA #/AREA URNS HPF: ABNORMAL /HPF
BILIRUB UR QL STRIP: NEGATIVE
COLOR UR AUTO: YELLOW
GLUCOSE UR STRIP-MCNC: NEGATIVE MG/DL
HGB UR QL STRIP: NEGATIVE
KETONES UR STRIP-MCNC: NEGATIVE MG/DL
LEUKOCYTE ESTERASE UR QL STRIP: ABNORMAL
NITRATE UR QL: NEGATIVE
PH UR STRIP: 7 [PH] (ref 5–7)
RBC #/AREA URNS AUTO: ABNORMAL /HPF
SP GR UR STRIP: 1.01 (ref 1–1.03)
SQUAMOUS #/AREA URNS AUTO: ABNORMAL /LPF
UROBILINOGEN UR STRIP-ACNC: 0.2 E.U./DL
WBC #/AREA URNS AUTO: ABNORMAL /HPF

## 2022-10-12 PROCEDURE — 99207 PR PRENATAL VISIT: CPT | Mod: GC | Performed by: STUDENT IN AN ORGANIZED HEALTH CARE EDUCATION/TRAINING PROGRAM

## 2022-10-12 PROCEDURE — 99213 OFFICE O/P EST LOW 20 MIN: CPT | Mod: GC | Performed by: STUDENT IN AN ORGANIZED HEALTH CARE EDUCATION/TRAINING PROGRAM

## 2022-10-12 PROCEDURE — 81001 URINALYSIS AUTO W/SCOPE: CPT | Performed by: STUDENT IN AN ORGANIZED HEALTH CARE EDUCATION/TRAINING PROGRAM

## 2022-10-12 PROCEDURE — 87086 URINE CULTURE/COLONY COUNT: CPT | Performed by: STUDENT IN AN ORGANIZED HEALTH CARE EDUCATION/TRAINING PROGRAM

## 2022-10-12 RX ORDER — OMEPRAZOLE 20 MG/1
20 TABLET, DELAYED RELEASE ORAL DAILY
Qty: 60 TABLET | Refills: 1 | Status: ON HOLD | OUTPATIENT
Start: 2022-10-12 | End: 2022-11-16

## 2022-10-12 NOTE — PROGRESS NOTES
"SUBJECTIVE:  Rashmi Oliva is a  at 34w3d gestation by 6 week US who returns today for prenatal care. Estimated Date of Delivery: 2022    - Concerns today: Heartburn, on pepcid.    Some pelvic pressure, feeling some dysuria. Will obtain UA.    - Patient reports no vaginal bleeding, no contractions/severe cramping, no leakage of fluid. Contractions none. Fetal movement is Present.   - No nausea/vomiting.   - No vaginal discharge. No dysuria.   - No headache, vision changes, lower extremity swelling, upper abdominal pain, chest pain, shortness of breath.     GDM on 17 U lantus daily  Fastin, 88, 87, 85, 89, 86, 89  1 hr post prandial: 126, 122, 120, 116, 126, 114  2 hr post prandial 118, 115, 111, 115, 118, 111, 118    Rashmi Oliva speaks English so an  was not used today.    OBJECTIVE:  /78   Pulse 68   Temp 98.7  F (37.1  C) (Oral)   Resp 20   Ht 1.57 m (5' 1.81\")   Wt 85.3 kg (188 lb)   LMP 2022   SpO2 98%   BMI 34.60 kg/m    Const: No distress  Abd: Gravid.   See flowsheet for fundal height, FHTs, edema, cervical exam.    Labs today: No results found for any visits on 10/12/22.    ASSESSMENT/PLAN:  27 year old , 34w3d weeks of pregnancy with DARLENE of 2022, by Ultrasound, confirmed by 6 week US.    Rashmi was seen today for prenatal care.    Diagnoses and all orders for this visit:    High-risk pregnancy, unspecified trimester  Low weight gain during pregnancy in third trimester  Gestational diabetes mellitus (GDM) in second trimester, gestational diabetes method of control unspecified   -Continue BPP/NST weekly   -Plan for fetal growth US between 35-36 week   -Continue lantus 17U    Gastroesophageal reflux disease with esophagitis without hemorrhage  -     omeprazole (PRILOSEC OTC) 20 MG EC tablet; Take 1 tablet (20 mg) by mouth daily    Dysuria  -     UA reflex to Microscopic and Culture; Future  -     UA reflex to Microscopic and Culture    - Pregnancy " complicated by:   - Prenatal labs reviewed.    - Third trimester Hgb is not needed and ordered.     - (36+ weeks) Obtain GBS after 36 weeks    - Contraception options after delivery: patient is interested in Combination OCP    -Tdap and flu shot this pregnancy: up to date.  The patient did agree to vaccinations.     - Pregnancy weight gain has been -2.268 kg (-5 lb) to date, stable.  - Counseled patient on topics as marked in the OB checklist. ontinue taking prenatal vitamins and avoiding cigarettes & alcohol.   -Breastfeeding education discussed. More details in Breastfeeding AVS    - Delivery plan completed with patient in the AVS.    -Patient recommended to see OB educator/RN today and/or future visit for education and/or care coordination.    - Return to clinic in 1 week.      Options for treatment and/or follow-up care were reviewed with the patient. Rashmi Oliva was engaged and actively involved in the decision making process. She verbalized understanding of the options discussed and was satisfied with the final plan.    Precepted with Dr. Anand Alejo MD

## 2022-10-13 ENCOUNTER — ANCILLARY PROCEDURE (OUTPATIENT)
Dept: ULTRASOUND IMAGING | Facility: CLINIC | Age: 27
End: 2022-10-13
Attending: FAMILY MEDICINE
Payer: COMMERCIAL

## 2022-10-13 ENCOUNTER — ALLIED HEALTH/NURSE VISIT (OUTPATIENT)
Dept: FAMILY MEDICINE | Facility: CLINIC | Age: 27
End: 2022-10-13
Payer: COMMERCIAL

## 2022-10-13 DIAGNOSIS — O09.90 HIGH-RISK PREGNANCY, UNSPECIFIED TRIMESTER: ICD-10-CM

## 2022-10-13 DIAGNOSIS — O24.414 INSULIN CONTROLLED GESTATIONAL DIABETES MELLITUS (GDM) IN THIRD TRIMESTER: ICD-10-CM

## 2022-10-13 PROCEDURE — 76819 FETAL BIOPHYS PROFIL W/O NST: CPT | Mod: TC | Performed by: RADIOLOGY

## 2022-10-13 PROCEDURE — 59025 FETAL NON-STRESS TEST: CPT | Mod: 26

## 2022-10-14 LAB — BACTERIA UR CULT: NORMAL

## 2022-10-20 ENCOUNTER — ANCILLARY PROCEDURE (OUTPATIENT)
Dept: ULTRASOUND IMAGING | Facility: CLINIC | Age: 27
End: 2022-10-20
Attending: FAMILY MEDICINE
Payer: COMMERCIAL

## 2022-10-20 ENCOUNTER — OFFICE VISIT (OUTPATIENT)
Dept: FAMILY MEDICINE | Facility: CLINIC | Age: 27
End: 2022-10-20
Payer: COMMERCIAL

## 2022-10-20 VITALS
OXYGEN SATURATION: 97 % | HEART RATE: 79 BPM | SYSTOLIC BLOOD PRESSURE: 110 MMHG | RESPIRATION RATE: 22 BRPM | TEMPERATURE: 98.8 F | DIASTOLIC BLOOD PRESSURE: 78 MMHG | BODY MASS INDEX: 35.52 KG/M2 | HEIGHT: 61 IN

## 2022-10-20 DIAGNOSIS — O24.414 INSULIN CONTROLLED GESTATIONAL DIABETES MELLITUS (GDM) IN THIRD TRIMESTER: ICD-10-CM

## 2022-10-20 DIAGNOSIS — O09.90 HIGH-RISK PREGNANCY, UNSPECIFIED TRIMESTER: Primary | ICD-10-CM

## 2022-10-20 PROCEDURE — H1001 ANTEPARTUM MANAGEMENT: HCPCS | Performed by: STUDENT IN AN ORGANIZED HEALTH CARE EDUCATION/TRAINING PROGRAM

## 2022-10-20 PROCEDURE — 99207 PR PRENATAL VISIT: CPT | Mod: GC | Performed by: STUDENT IN AN ORGANIZED HEALTH CARE EDUCATION/TRAINING PROGRAM

## 2022-10-20 PROCEDURE — 76819 FETAL BIOPHYS PROFIL W/O NST: CPT | Mod: TC | Performed by: RADIOLOGY

## 2022-10-20 PROCEDURE — 59025 FETAL NON-STRESS TEST: CPT | Mod: 26 | Performed by: STUDENT IN AN ORGANIZED HEALTH CARE EDUCATION/TRAINING PROGRAM

## 2022-10-20 NOTE — PROGRESS NOTES
"SUBJECTIVE:  Rashmi Oliva is a  at 35w4d gestation by 6 week US who returns today for prenatal care. Estimated Date of Delivery: 2022    - Concerns today: low back pain very achey pain. Lying down or getting up is worse. Started 2 days ago.   - Patient reports no vaginal bleeding, no contractions/severe cramping, no leakage of fluid. Contractions none. Fetal movement is Present.   - No nausea/vomiting. Bad heartburn, pepsid help as well as PPI.   - No abnormal vaginal discharge. Some dysuria, feeling a stinging but not sure if baby is moving lower, recent UA and UCx negative.  - No headache, vision changes, lower extremity swelling, upper abdominal pain, chest pain, shortness of breath.   - Mood has been okay, sometimes cranky, feeling less stressed at work. Some arguments with .      GDM BG    Fastin, 86, 89, 90, 88, 87, 87, 97  1 hour post prandial: 121, 120, 117, 120, 110, 115, 118, 119  2 hour post prandial:110, 115, 114, 116, 112, 115, 118    GDM Glucose Monitoring Goals:  Fasting blood glucose concentration: <95 mg/dL (5.3 mmol/L)    One-hour postprandial blood glucose concentration: <140 mg/dL (7.8 mmol/L)    Two-hour postprandial glucose concentration: <120 mg/dL (6.7 mmol/L)      Going to [facility]? Yes    Do you need help getting a car seat? YES  Do you need help getting a breast pump? YES, debating on breastfeeding    Rashmi Oliva speaks English so an  was not used today.    OBJECTIVE:  /78   Pulse 79   Temp 98.8  F (37.1  C)   Resp 22   Ht 1.549 m (5' 1\")   LMP 2022   SpO2 97%   BMI 35.52 kg/m    Const: No distress  Abd: Gravid.   See flowsheet for fundal height, FHTs, edema.    Labs today: No results found for any visits on 10/20/22.    ASSESSMENT/PLAN:  27 year old , 35w4d weeks of pregnancy with DARLENE of 2022, by Ultrasound, confirmed by 6 week US.    Rashmi was seen today for prenatal care.    Diagnoses and all orders for this " visit:    High-risk pregnancy, third trimester  Insulin controlled gestational diabetes mellitus (GDM) in third trimester  Complicated by GDM controlled with 17U lantus at night. Sugars are at goal. Today concern of increased low back pain, discussed trying belly band to provide some relief. NST reactive today, she feels baby moving frequently. Will follow up next week for a growth ultrasound as she has consistently measured 2cm above expected fundal height in addition to GDM. At next visit will connect about car seat. No major dysuria, UA last week was normal; not indicated to repeat.  -     US OB > 14 Weeks; Future  - Follow up in 1 week in clinic       Weekly BPP 8/8 today, NST reactive.      - Pregnancy complicated by: GDM  - Prenatal labs reviewed.      - Fetal survey showed the following abnormalities: none; a follow up ultrasound is required for growth.  - (36+ weeks) Obtain GBS after 36 weeks    - Contraception options after delivery: patient is interested in Combination OCP    -Tdap this pregnancy: up to date.   -Flu shot this pregnancy: not up to date. Declines.  - Pregnancy weight gain has been -2.268 kg (-5 lb) to date, which is stable.   Target weight gain is 11-20 lbs (pregravid BMI >30).    - Counseled patient on topics as marked in the OB checklist.    - Patient will continue taking prenatal vitamins and avoiding cigarettes & alcohol.   -Breastfeeding education discussed. More details in Breastfeeding AVS  - Supporting a non-medicated birth  - Skin to Skin  - Non-separation of mother and baby  - Delivery plan completed with patient in the AVS.    -Patient recommended to see OB educator/RN today and/or future visit for education and/or care coordination.    - Return to clinic in 1 week.      Options for treatment and/or follow-up care were reviewed with the patient. Rashmimilind Oliva was engaged and actively involved in the decision making process. She verbalized understanding of the options discussed and  was satisfied with the final plan.    Precepted with Dr. Michael Urban  MS3, [facility]    I was present with the medical student who participated in the service and in the documentation of this note. I have verified the history and personally performed the physical exam and medical decision making, and have verified the content of the note, which accurately reflects my assessment of the patient and the plan of care.     Caridad Alejo MD

## 2022-10-23 NOTE — PROGRESS NOTES
Preceptor Attestation:   Patient seen, evaluated and discussed with the resident. I personally viewed the NST and agree with the interpretation documented by the resident. I have verified the content of the note, which accurately reflects my assessment of the patient and the plan of care.    Supervising Physician:Ghislaine Rodriguez DO [facility]

## 2022-10-26 ENCOUNTER — OFFICE VISIT (OUTPATIENT)
Dept: FAMILY MEDICINE | Facility: CLINIC | Age: 27
End: 2022-10-26
Payer: COMMERCIAL

## 2022-10-26 VITALS
TEMPERATURE: 98.3 F | HEIGHT: 61 IN | DIASTOLIC BLOOD PRESSURE: 71 MMHG | HEART RATE: 73 BPM | SYSTOLIC BLOOD PRESSURE: 106 MMHG | OXYGEN SATURATION: 98 % | RESPIRATION RATE: 20 BRPM | WEIGHT: 189 LBS | BODY MASS INDEX: 35.68 KG/M2

## 2022-10-26 DIAGNOSIS — O24.414 INSULIN CONTROLLED GESTATIONAL DIABETES MELLITUS (GDM) IN THIRD TRIMESTER: ICD-10-CM

## 2022-10-26 DIAGNOSIS — O09.90 HIGH-RISK PREGNANCY, UNSPECIFIED TRIMESTER: Primary | ICD-10-CM

## 2022-10-26 PROCEDURE — 87653 STREP B DNA AMP PROBE: CPT | Performed by: STUDENT IN AN ORGANIZED HEALTH CARE EDUCATION/TRAINING PROGRAM

## 2022-10-26 PROCEDURE — 99207 PR PRENATAL VISIT: CPT | Mod: GC | Performed by: STUDENT IN AN ORGANIZED HEALTH CARE EDUCATION/TRAINING PROGRAM

## 2022-10-26 NOTE — PROGRESS NOTES
"SUBJECTIVE:  Rashmi Oliva is a  at 36w3d gestation by 6   week US who returns today for prenatal care. Estimated Date of Delivery: 2022    Struggling to sleep. Sleeps on sides and feels numbness and tingling in her legs and then needs to readjust. Feeling uncomfortable, uses pillows for support. Feels like baby is moving a lot when she is walking. Does not think it is a contraction as she felt that with her son, feels like a tight squeeze in the pelvis--which she currently denies. Stops when she stops moving. Feels him kicking and moving. Feeling more cranky, felt nausea yesterday. 15:46 lunch, sugar 118. Around 7pm nauseated, blood sugar 181. 20:07pm went to bed without eating. No vomiting. This morning 08:58 blood sugar 80s. Taking 17U lantus at night. Lateral leg numbness.     Denies any other BG checks outside of goal.    - Concerns today: trouble sleeping, numbness when sleeping; does not want medication as needed.  - Patient reports no vaginal bleeding, no contractions/severe cramping, no leakage of fluid. Contractions none. Fetal movement is Present.   - some nausea, no vomiting. No heartburn.   - No vaginal discharge. No dysuria.   - No headache, vision changes, lower extremity swelling, upper abdominal pain, chest pain, shortness of breath.   - Mood has been fine. Occasionally cranky.    Going to [facility]? Yes    Do you need help getting a car seat? YES  Do you need help getting a breast pump? YES    Rashmi Oliva speaks English so an  was not used today.    OBJECTIVE:  /71   Pulse 73   Temp 98.3  F (36.8  C)   Resp 20   Ht 1.549 m (5' 1\")   Wt 85.7 kg (189 lb)   LMP 2022   SpO2 98%   BMI 35.71 kg/m    Const: No distress  Abd: Gravid.   See flowsheet for fundal height, FHTs, edema, cervical exam.    Labs today: No results found for any visits on 10/26/22.    ASSESSMENT/PLAN:  27 year old , 36w3d weeks of pregnancy with DARLENE of 2022, by Ultrasound, confirmed by " 6 week US.    Rashmi was seen today for prenatal care and medication reconciliation.    Diagnoses and all orders for this visit:    High-risk pregnancy, unspecified trimester  Insulin controlled gestational diabetes mellitus (GDM) in third trimester  Concerns today include trouble sleeping and one sugar out of goal range yesterday. Felt nauseated yesterday and blood glucose yesterday was 181 with the episode. Feeling some pelvic pressure when she walks around frequently. Routine GBS screening. Growth ultrasound and NST scheduled for tomorrow. Will continue with 17 U lantus daily. Will check A1c next visit.   -     Group B strep PCR  - Growth US and NST tomorrow  - Follow up in one week  - Plan induction for 39 weeks gestation    - Pregnancy complicated by: GDM controlled by insulin  - Prenatal labs reviewed.    - Third trimester Hgb is not needed and ordered.   - Fetal survey showed the following abnormalities: none; a follow up ultrasound is required with GDM.  - (36+ weeks) Obtain GBS today  - (40+weeks) Discussed postdates management. Induction planned for 39 weeks  - (TOLAC) N/A..  - Contraception options after delivery: patient is interested in Combination OCP  - (Postpartum Tubal Ligation) NA.    -Tdap and flu shot this pregnancy: up to date.  The patient did agree to vaccinations.     - Pregnancy weight gain has been -1.814 kg (-4 lb) to date.  - Counseled patient on topics as marked in the OB checklist.    - Patient will continue taking prenatal vitamins and avoiding cigarettes & alcohol.   -Breastfeeding education discussed. More details in Breastfeeding AVS  - Supporting a non-medicated birth  - Skin to Skin  - Non-separation of mother and baby  - Delivery plan completed with patient in the AVS.    -Patient recommended to see OB educator/RN today and/or future visit for education and/or care coordination.    - Return to clinic in 1 week.      Options for treatment and/or follow-up care were reviewed with  the patient. Rashmi Oliva was engaged and actively involved in the decision making process. She verbalized understanding of the options discussed and was satisfied with the final plan.    Precepted with Dr. Lloyd Urban  MS3, [facility]    I was present with the medical student who participated in the service and in the documentation of this note. I have verified the history and personally performed the physical exam and medical decision making, and have verified the content of the note, which accurately reflects my assessment of the patient and the plan of care.       Caridad Alejo MD

## 2022-10-27 ENCOUNTER — ALLIED HEALTH/NURSE VISIT (OUTPATIENT)
Dept: FAMILY MEDICINE | Facility: CLINIC | Age: 27
End: 2022-10-27
Payer: COMMERCIAL

## 2022-10-27 ENCOUNTER — ANCILLARY PROCEDURE (OUTPATIENT)
Dept: ULTRASOUND IMAGING | Facility: CLINIC | Age: 27
End: 2022-10-27
Attending: FAMILY MEDICINE
Payer: COMMERCIAL

## 2022-10-27 DIAGNOSIS — O09.90 HIGH-RISK PREGNANCY, UNSPECIFIED TRIMESTER: ICD-10-CM

## 2022-10-27 DIAGNOSIS — Z32.00 PREGNANCY EXAMINATION OR TEST, PREGNANCY UNCONFIRMED: Primary | ICD-10-CM

## 2022-10-27 DIAGNOSIS — O24.414 INSULIN CONTROLLED GESTATIONAL DIABETES MELLITUS (GDM) IN THIRD TRIMESTER: ICD-10-CM

## 2022-10-27 LAB — GP B STREP DNA SPEC QL NAA+PROBE: NEGATIVE

## 2022-10-27 PROCEDURE — 76816 OB US FOLLOW-UP PER FETUS: CPT | Mod: TC | Performed by: RADIOLOGY

## 2022-10-27 PROCEDURE — 76819 FETAL BIOPHYS PROFIL W/O NST: CPT | Mod: TC | Performed by: RADIOLOGY

## 2022-10-27 PROCEDURE — 59025 FETAL NON-STRESS TEST: CPT

## 2022-11-03 ENCOUNTER — ANCILLARY PROCEDURE (OUTPATIENT)
Dept: ULTRASOUND IMAGING | Facility: CLINIC | Age: 27
End: 2022-11-03
Attending: FAMILY MEDICINE
Payer: COMMERCIAL

## 2022-11-03 ENCOUNTER — OFFICE VISIT (OUTPATIENT)
Dept: FAMILY MEDICINE | Facility: CLINIC | Age: 27
End: 2022-11-03
Payer: COMMERCIAL

## 2022-11-03 VITALS
WEIGHT: 190 LBS | TEMPERATURE: 98.3 F | OXYGEN SATURATION: 98 % | BODY MASS INDEX: 34.96 KG/M2 | HEART RATE: 76 BPM | DIASTOLIC BLOOD PRESSURE: 69 MMHG | SYSTOLIC BLOOD PRESSURE: 116 MMHG | RESPIRATION RATE: 20 BRPM | HEIGHT: 62 IN

## 2022-11-03 DIAGNOSIS — O09.90 HIGH-RISK PREGNANCY, UNSPECIFIED TRIMESTER: ICD-10-CM

## 2022-11-03 DIAGNOSIS — O24.414 INSULIN CONTROLLED GESTATIONAL DIABETES MELLITUS (GDM) IN THIRD TRIMESTER: ICD-10-CM

## 2022-11-03 DIAGNOSIS — O09.90 HIGH-RISK PREGNANCY, UNSPECIFIED TRIMESTER: Primary | ICD-10-CM

## 2022-11-03 DIAGNOSIS — O24.419 GESTATIONAL DIABETES MELLITUS (GDM) IN SECOND TRIMESTER, GESTATIONAL DIABETES METHOD OF CONTROL UNSPECIFIED: ICD-10-CM

## 2022-11-03 LAB — HBA1C MFR BLD: 6.4 % (ref 0–5.6)

## 2022-11-03 PROCEDURE — 76816 OB US FOLLOW-UP PER FETUS: CPT | Mod: TC | Performed by: RADIOLOGY

## 2022-11-03 PROCEDURE — 83036 HEMOGLOBIN GLYCOSYLATED A1C: CPT | Performed by: STUDENT IN AN ORGANIZED HEALTH CARE EDUCATION/TRAINING PROGRAM

## 2022-11-03 PROCEDURE — 59025 FETAL NON-STRESS TEST: CPT | Mod: GC | Performed by: STUDENT IN AN ORGANIZED HEALTH CARE EDUCATION/TRAINING PROGRAM

## 2022-11-03 PROCEDURE — 59426 ANTEPARTUM CARE ONLY: CPT | Mod: GC | Performed by: STUDENT IN AN ORGANIZED HEALTH CARE EDUCATION/TRAINING PROGRAM

## 2022-11-03 PROCEDURE — 76819 FETAL BIOPHYS PROFIL W/O NST: CPT | Mod: TC | Performed by: RADIOLOGY

## 2022-11-03 PROCEDURE — 36415 COLL VENOUS BLD VENIPUNCTURE: CPT | Performed by: STUDENT IN AN ORGANIZED HEALTH CARE EDUCATION/TRAINING PROGRAM

## 2022-11-03 NOTE — PROGRESS NOTES
Preceptor Attestation:   Patient seen, evaluated and discussed with the resident. I personally viewed the NST and agree with the interpretation documented by the resident. I have verified the content of the note, which accurately reflects my assessment of the patient and the plan of care.  Supervising Physician:Nallely Ledbetter MD  [facility]

## 2022-11-03 NOTE — PROGRESS NOTES
"SUBJECTIVE:  Rashmi Oliva is a  at 37w4d gestation by 6 week US who returns today for prenatal care. Estimated Date of Delivery: 2022    - Concerns today: GDM, GERD is under control with Prilosec and as needed Pepcid, continues to have pelvic pain with movement, does resolve with rest.    BG at goal, one fasting of 99.  Continue 17 U lantus nightly    BPP 6/8, NST reactive; in total BPP 8/10.      - Patient reports no vaginal bleeding, no contractions/severe cramping, no leakage of fluid. Contractions none, does have pelvic pressure with movement. Fetal movement is Present.  - No nausea/vomiting. No heartburn.   - No vaginal discharge. No dysuria.   - No headache, vision changes, lower extremity swelling, upper abdominal pain, chest pain, shortness of breath.   - Mood has been fine.    Rashmi Oliva speaks English so an  was not used today.    OBJECTIVE:  /69   Pulse 76   Temp 98.3  F (36.8  C)   Resp 20   Ht 1.575 m (5' 2.01\")   Wt 86.2 kg (190 lb)   LMP 2022   SpO2 98%   BMI 34.74 kg/m    Const: No distress  Abd: Gravid.   See flowsheet for fundal height, FHTs, edema, cervical exam.    Labs today:   Results for orders placed or performed in visit on 22   Hemoglobin A1c     Status: Abnormal   Result Value Ref Range    Hemoglobin A1C 6.4 (H) 0.0 - 5.6 %   Results for orders placed or performed in visit on 22   US OB Biophys Single Gestation Measure     Status: None    Narrative    EXAM:  OB BIOPHYS SINGLE GESTATION W MEASURE  LOCATION: [facility]  DATE/TIME: 11/3/2022 9:26 AM    INDICATION: Gestational diabetes. Assess fetal well-being.  COMPARISON: 10/27/2022 and 10/20/2022.    FINDINGS:  Single living fetus, vertex presentation.    HEART RATE: 134 bpm.  SDP 4.8 cm.  PLACENTA: Anterior. No previa.  CERVIX: Obscured by low fetal head position and skull ossification.    BIOPHYSICAL PROFILE:  0/2 fetal breathing  2/2 fetal " movements  2/2 fetal tone  2/2 amniotic fluid     Total biophysical profile     BIOMETRY:  Biparietal Diameter: 9.1 cm, 37 weeks 0 days  Head Circumference: 34.0 cm, 39 weeks 1 day  Abdominal Circumference: 34.4 cm, 38 weeks 2 days  Femur Length: 7.4 cm, 37 weeks 4 days    Estimated Fetal Weight: 3388 g  EFW Percentile: 73 percent    EDC by prior reported datin2022  EDC by This US exam: 2022    Composite Age prior reported datin weeks 4 days   Composite Age by This US: 37 weeks 5 days      Impression    IMPRESSION:  1.  Abnormal biophysical profile score of 6 out of 8. Score of 0 for diminished fetal breathing movement.  2.  Single living intrauterine gestation in vertex presentation.  3.  Based on prior dating, composite age of 37 weeks 4 days with EDC 2022.  4.  Normal interval growth.  5.  Normal amniotic fluid volume.       ASSESSMENT/PLAN:  27 year old , 37w4d weeks of pregnancy with DARLENE of 2022, by Ultrasound, confirmed by 6 week US.    Rashmi was seen today for prenatal care and medication reconciliation.    Diagnoses and all orders for this visit:    High-risk pregnancy, unspecified trimester  -     Hemoglobin A1c; Future  -     US OB > 14 Weeks; Future  -     Hemoglobin A1c    Gestational diabetes mellitus (GDM) in second trimester, gestational diabetes method of control unspecified  -     Hemoglobin A1c; Future  -     US OB > 14 Weeks; Future  -     Hemoglobin A1c      Growth US at last visit with decreased fetal femur growth compared to the rest of his body, has resolved today with better imaging.  Estimated fetal growth at the 73rd percentile, normal interval growth is appreciated.    A1c is 6.4, just under cutoff for 6.5 which would indicate sooner induction.  With diabetes under control with insulin and continuing BPP's at goal, will continue to plan for induction at 39 weeks.      - Pregnancy complicated by: GDM controlled on insulin.  - Prenatal labs reviewed.       - GBS negative.     - Contraception options after delivery: patient is interested in Combination OCP    -Tdap up-to-date this pregnancy, declining flu and COVID.    - Patient will continue taking prenatal vitamins and avoiding cigarettes & alcohol.   -Breastfeeding education discussed. More details in Breastfeeding AVS    - Delivery plan completed with patient in the AVS.    -Patient recommended to see OB educator/RN today and/or future visit for education and/or care coordination.    - Return to clinic in 1 week.      Options for treatment and/or follow-up care were reviewed with the patient. Rashmi Oliva was engaged and actively involved in the decision making process. She verbalized understanding of the options discussed and was satisfied with the final plan.    Precepted with Dr. Ledbetter.    Caridad Alejo MD

## 2022-11-10 ENCOUNTER — ANCILLARY PROCEDURE (OUTPATIENT)
Dept: ULTRASOUND IMAGING | Facility: CLINIC | Age: 27
End: 2022-11-10
Attending: FAMILY MEDICINE
Payer: COMMERCIAL

## 2022-11-10 ENCOUNTER — OFFICE VISIT (OUTPATIENT)
Dept: FAMILY MEDICINE | Facility: CLINIC | Age: 27
End: 2022-11-10
Payer: COMMERCIAL

## 2022-11-10 VITALS
OXYGEN SATURATION: 97 % | HEIGHT: 61 IN | BODY MASS INDEX: 36.44 KG/M2 | HEART RATE: 80 BPM | SYSTOLIC BLOOD PRESSURE: 121 MMHG | RESPIRATION RATE: 22 BRPM | DIASTOLIC BLOOD PRESSURE: 74 MMHG | TEMPERATURE: 97.8 F | WEIGHT: 193 LBS

## 2022-11-10 DIAGNOSIS — O24.414 INSULIN CONTROLLED GESTATIONAL DIABETES MELLITUS (GDM) IN THIRD TRIMESTER: ICD-10-CM

## 2022-11-10 DIAGNOSIS — O09.90 HIGH-RISK PREGNANCY, UNSPECIFIED TRIMESTER: Primary | ICD-10-CM

## 2022-11-10 DIAGNOSIS — O09.90 HIGH-RISK PREGNANCY, UNSPECIFIED TRIMESTER: ICD-10-CM

## 2022-11-10 PROCEDURE — 99207 PR PRENATAL VISIT: CPT | Mod: GC | Performed by: STUDENT IN AN ORGANIZED HEALTH CARE EDUCATION/TRAINING PROGRAM

## 2022-11-10 PROCEDURE — 76819 FETAL BIOPHYS PROFIL W/O NST: CPT | Mod: TC | Performed by: RADIOLOGY

## 2022-11-10 PROCEDURE — 59025 FETAL NON-STRESS TEST: CPT | Mod: 26 | Performed by: STUDENT IN AN ORGANIZED HEALTH CARE EDUCATION/TRAINING PROGRAM

## 2022-11-10 NOTE — PROGRESS NOTES
"SUBJECTIVE:  Rashmi Oliva is a  at 38w4d gestation by 8 week US who returns today for prenatal care. Estimated Date of Delivery: 2022    GDM:  1 hour post prandial 132  Fasting this AM 99, she did not take lantus last night   Otherwise sugars at goal.    - Concerns today: None.  - Patient reports no vaginal bleeding, no contractions/severe cramping, no leakage of fluid. Contractions few, not coordinated. Fetal movement is Present.   - No nausea/vomiting. No heartburn.   - No vaginal discharge. No dysuria.   - No headache, vision changes, lower extremity swelling, upper abdominal pain, chest pain, shortness of breath.   - Mood has been fine.    Rashmi Oliva speaks English so an  was not used today.    OBJECTIVE:  /74   Pulse 80   Temp 97.8  F (36.6  C)   Resp 22   Ht 1.549 m (5' 1\")   Wt 87.5 kg (193 lb)   LMP 2022   SpO2 97%   BMI 36.47 kg/m    Const: No distress  Abd: Gravid.   See flowsheet for fundal height, FHTs, edema, cervical exam.    Labs today:   Results for orders placed or performed in visit on 11/10/22   US OB Biophysical Profile w/o Non Stress (Single)     Status: None    Narrative    EXAM: US OB FETAL BIOPHY PROFILE W/O NST SINGLE W/LTD  LOCATION: [facility]  DATE/TIME: 11/10/2022 8:36 AM    INDICATION: Gestational diabetes. Assess fetal well-being.  COMPARISON: 11/3/2022 and 10/27/2022    FINDINGS:  Single living fetus, vertex presentation.    HEART RATE: 140 bpm.  SDP 6.5 cm.  PLACENTA: Anterior. No previa.  CERVIX: Obscured by low fetal head position and skull ossification.     BIOPHYSICAL PROFILE:  2/2 fetal breathing  2/2 fetal movements  2/2 fetal tone  2/2 amniotic fluid    Total biophysical profile       Impression    IMPRESSION:  1.  Single living intrauterine gestation in vertex presentation.  2.  Normal biophysical profile score of 8 out of 8.  3.  Normal amniotic fluid volume.       ASSESSMENT/PLAN:  27 year old " , 38w4d weeks of pregnancy with DARLENE of 2022, by Ultrasound, confirmed by 8 week US.    Rashmi was seen today for prenatal care.    Diagnoses and all orders for this visit:    High-risk pregnancy, unspecified trimester    Insulin controlled gestational diabetes mellitus (GDM) in third trimester      Continue 17 U lantus. Plan for induction on 11/15/2022.  - Pregnancy complicated by: GDM, poor weight gain in pregnancy  - Prenatal labs reviewed.      -GBS negative.    - Contraception options after delivery: patient is interested in Combination OCP    - Pregnancy weight gain has been 0 kg (0 lb) to date.  - Discussed delivery plan, induction on 11/15 for GDM. At this point would need cytotec for cervical ripening.     - Delivery plan completed with patient in the AVS.  Would be interested in IV pain management, not interested in epidural.    -Patient recommended to see OB educator/RN today and/or future visit for education and/or care coordination.    - Return to clinic in 1 week.      Options for treatment and/or follow-up care were reviewed with the patient. Rashmi Oliva was engaged and actively involved in the decision making process. She verbalized understanding of the options discussed and was satisfied with the final plan.    Precepted with Dr. Ledbetter.  Caridad Alejo MD

## 2022-11-10 NOTE — PROGRESS NOTES
Preceptor Attestation:   Patient seen, evaluated and discussed with the resident. I personally viewed the NST which was reactive. I have verified the content of the note, which accurately reflects my assessment of the patient and the plan of care.  Supervising Physician:Nallely Ledbetter MD  [facility]

## 2022-11-10 NOTE — PATIENT INSTRUCTIONS
Induction on Tuesday! Call [facility] at 6:30 AM to make sure they are able to take you, plan to show up at 7:45 AM.    [phone]

## 2022-11-10 NOTE — PROGRESS NOTES
Preceptor Attestation:   I personally viewed the NST and agree with the interpretation of Reactive. .  Supervising Physician:Amrik Krishna MD  [facility]

## 2022-11-15 ENCOUNTER — TELEPHONE (OUTPATIENT)
Dept: FAMILY MEDICINE | Facility: CLINIC | Age: 27
End: 2022-11-15

## 2022-11-15 ENCOUNTER — HOSPITAL ENCOUNTER (INPATIENT)
Facility: HOSPITAL | Age: 27
LOS: 1 days | Discharge: HOME | End: 2022-11-16
Attending: FAMILY MEDICINE | Admitting: FAMILY MEDICINE
Payer: COMMERCIAL

## 2022-11-15 DIAGNOSIS — Z3A.39 39 WEEKS GESTATION OF PREGNANCY: ICD-10-CM

## 2022-11-15 PROBLEM — Z34.90 PREGNANCY: Status: ACTIVE | Noted: 2022-11-15

## 2022-11-15 LAB
GLUCOSE BLDC GLUCOMTR-MCNC: 92 MG/DL (ref 70–99)
GLUCOSE BLDC GLUCOMTR-MCNC: 98 MG/DL (ref 70–99)
SARS-COV-2 RNA RESP QL NAA+PROBE: NEGATIVE

## 2022-11-15 PROCEDURE — U0005 INFEC AGEN DETEC AMPLI PROBE: HCPCS | Performed by: FAMILY MEDICINE

## 2022-11-15 PROCEDURE — 722N000001 HC LABOR CARE VAGINAL DELIVERY SINGLE

## 2022-11-15 PROCEDURE — 10907ZC DRAINAGE OF AMNIOTIC FLUID, THERAPEUTIC FROM PRODUCTS OF CONCEPTION, VIA NATURAL OR ARTIFICIAL OPENING: ICD-10-PCS | Performed by: OBSTETRICS & GYNECOLOGY

## 2022-11-15 PROCEDURE — 250N000011 HC RX IP 250 OP 636: Performed by: FAMILY MEDICINE

## 2022-11-15 PROCEDURE — 250N000013 HC RX MED GY IP 250 OP 250 PS 637

## 2022-11-15 PROCEDURE — 0KQM0ZZ REPAIR PERINEUM MUSCLE, OPEN APPROACH: ICD-10-PCS | Performed by: OBSTETRICS & GYNECOLOGY

## 2022-11-15 PROCEDURE — 120N000001 HC R&B MED SURG/OB

## 2022-11-15 RX ORDER — MISOPROSTOL 200 UG/1
800 TABLET ORAL
Status: DISCONTINUED | OUTPATIENT
Start: 2022-11-15 | End: 2022-11-15 | Stop reason: HOSPADM

## 2022-11-15 RX ORDER — DOCUSATE SODIUM 100 MG/1
100 CAPSULE, LIQUID FILLED ORAL DAILY
Status: DISCONTINUED | OUTPATIENT
Start: 2022-11-15 | End: 2022-11-16 | Stop reason: HOSPADM

## 2022-11-15 RX ORDER — IBUPROFEN 800 MG/1
800 TABLET, FILM COATED ORAL EVERY 6 HOURS PRN
Status: DISCONTINUED | OUTPATIENT
Start: 2022-11-15 | End: 2022-11-16 | Stop reason: HOSPADM

## 2022-11-15 RX ORDER — PROCHLORPERAZINE MALEATE 10 MG
10 TABLET ORAL EVERY 6 HOURS PRN
Status: DISCONTINUED | OUTPATIENT
Start: 2022-11-15 | End: 2022-11-15 | Stop reason: HOSPADM

## 2022-11-15 RX ORDER — ONDANSETRON 2 MG/ML
4 INJECTION INTRAMUSCULAR; INTRAVENOUS EVERY 6 HOURS PRN
Status: DISCONTINUED | OUTPATIENT
Start: 2022-11-15 | End: 2022-11-15 | Stop reason: HOSPADM

## 2022-11-15 RX ORDER — MISOPROSTOL 200 UG/1
800 TABLET ORAL
Status: DISCONTINUED | OUTPATIENT
Start: 2022-11-15 | End: 2022-11-16 | Stop reason: HOSPADM

## 2022-11-15 RX ORDER — OXYTOCIN/0.9 % SODIUM CHLORIDE 30/500 ML
100-340 PLASTIC BAG, INJECTION (ML) INTRAVENOUS CONTINUOUS PRN
Status: DISCONTINUED | OUTPATIENT
Start: 2022-11-15 | End: 2022-11-16 | Stop reason: HOSPADM

## 2022-11-15 RX ORDER — NICOTINE POLACRILEX 4 MG
15-30 LOZENGE BUCCAL
Status: DISCONTINUED | OUTPATIENT
Start: 2022-11-15 | End: 2022-11-16 | Stop reason: HOSPADM

## 2022-11-15 RX ORDER — KETOROLAC TROMETHAMINE 30 MG/ML
30 INJECTION, SOLUTION INTRAMUSCULAR; INTRAVENOUS
Status: DISCONTINUED | OUTPATIENT
Start: 2022-11-15 | End: 2022-11-16 | Stop reason: HOSPADM

## 2022-11-15 RX ORDER — NALOXONE HYDROCHLORIDE 0.4 MG/ML
0.4 INJECTION, SOLUTION INTRAMUSCULAR; INTRAVENOUS; SUBCUTANEOUS
Status: DISCONTINUED | OUTPATIENT
Start: 2022-11-15 | End: 2022-11-15 | Stop reason: HOSPADM

## 2022-11-15 RX ORDER — ACETAMINOPHEN 325 MG/1
650 TABLET ORAL EVERY 4 HOURS PRN
Status: DISCONTINUED | OUTPATIENT
Start: 2022-11-15 | End: 2022-11-16 | Stop reason: HOSPADM

## 2022-11-15 RX ORDER — MODIFIED LANOLIN
OINTMENT (GRAM) TOPICAL
Status: DISCONTINUED | OUTPATIENT
Start: 2022-11-15 | End: 2022-11-16 | Stop reason: HOSPADM

## 2022-11-15 RX ORDER — NALOXONE HYDROCHLORIDE 0.4 MG/ML
0.2 INJECTION, SOLUTION INTRAMUSCULAR; INTRAVENOUS; SUBCUTANEOUS
Status: DISCONTINUED | OUTPATIENT
Start: 2022-11-15 | End: 2022-11-15 | Stop reason: HOSPADM

## 2022-11-15 RX ORDER — OXYTOCIN 10 [USP'U]/ML
10 INJECTION, SOLUTION INTRAMUSCULAR; INTRAVENOUS
Status: DISCONTINUED | OUTPATIENT
Start: 2022-11-15 | End: 2022-11-16 | Stop reason: HOSPADM

## 2022-11-15 RX ORDER — OXYTOCIN/0.9 % SODIUM CHLORIDE 30/500 ML
340 PLASTIC BAG, INJECTION (ML) INTRAVENOUS CONTINUOUS PRN
Status: DISCONTINUED | OUTPATIENT
Start: 2022-11-15 | End: 2022-11-16 | Stop reason: HOSPADM

## 2022-11-15 RX ORDER — METHYLERGONOVINE MALEATE 0.2 MG/ML
200 INJECTION INTRAVENOUS
Status: DISCONTINUED | OUTPATIENT
Start: 2022-11-15 | End: 2022-11-16 | Stop reason: HOSPADM

## 2022-11-15 RX ORDER — PROCHLORPERAZINE 25 MG
25 SUPPOSITORY, RECTAL RECTAL EVERY 12 HOURS PRN
Status: DISCONTINUED | OUTPATIENT
Start: 2022-11-15 | End: 2022-11-15 | Stop reason: HOSPADM

## 2022-11-15 RX ORDER — CARBOPROST TROMETHAMINE 250 UG/ML
250 INJECTION, SOLUTION INTRAMUSCULAR
Status: DISCONTINUED | OUTPATIENT
Start: 2022-11-15 | End: 2022-11-16 | Stop reason: HOSPADM

## 2022-11-15 RX ORDER — MISOPROSTOL 200 UG/1
400 TABLET ORAL
Status: DISCONTINUED | OUTPATIENT
Start: 2022-11-15 | End: 2022-11-15 | Stop reason: HOSPADM

## 2022-11-15 RX ORDER — OXYTOCIN 10 [USP'U]/ML
10 INJECTION, SOLUTION INTRAMUSCULAR; INTRAVENOUS
Status: COMPLETED | OUTPATIENT
Start: 2022-11-15 | End: 2022-11-15

## 2022-11-15 RX ORDER — CITRIC ACID/SODIUM CITRATE 334-500MG
30 SOLUTION, ORAL ORAL
Status: DISCONTINUED | OUTPATIENT
Start: 2022-11-15 | End: 2022-11-15 | Stop reason: HOSPADM

## 2022-11-15 RX ORDER — METHYLERGONOVINE MALEATE 0.2 MG/ML
200 INJECTION INTRAVENOUS
Status: DISCONTINUED | OUTPATIENT
Start: 2022-11-15 | End: 2022-11-15 | Stop reason: HOSPADM

## 2022-11-15 RX ORDER — OXYTOCIN/0.9 % SODIUM CHLORIDE 30/500 ML
340 PLASTIC BAG, INJECTION (ML) INTRAVENOUS CONTINUOUS PRN
Status: DISCONTINUED | OUTPATIENT
Start: 2022-11-15 | End: 2022-11-15 | Stop reason: HOSPADM

## 2022-11-15 RX ORDER — ONDANSETRON 4 MG/1
4 TABLET, ORALLY DISINTEGRATING ORAL EVERY 6 HOURS PRN
Status: DISCONTINUED | OUTPATIENT
Start: 2022-11-15 | End: 2022-11-15 | Stop reason: HOSPADM

## 2022-11-15 RX ORDER — DEXTROSE MONOHYDRATE 25 G/50ML
25-50 INJECTION, SOLUTION INTRAVENOUS
Status: DISCONTINUED | OUTPATIENT
Start: 2022-11-15 | End: 2022-11-16 | Stop reason: HOSPADM

## 2022-11-15 RX ORDER — MISOPROSTOL 200 UG/1
400 TABLET ORAL
Status: DISCONTINUED | OUTPATIENT
Start: 2022-11-15 | End: 2022-11-16 | Stop reason: HOSPADM

## 2022-11-15 RX ORDER — METOCLOPRAMIDE 10 MG/1
10 TABLET ORAL EVERY 6 HOURS PRN
Status: DISCONTINUED | OUTPATIENT
Start: 2022-11-15 | End: 2022-11-15 | Stop reason: HOSPADM

## 2022-11-15 RX ORDER — HYDROCORTISONE 25 MG/G
CREAM TOPICAL 3 TIMES DAILY PRN
Status: DISCONTINUED | OUTPATIENT
Start: 2022-11-15 | End: 2022-11-16 | Stop reason: HOSPADM

## 2022-11-15 RX ORDER — LIDOCAINE 40 MG/G
CREAM TOPICAL
Status: DISCONTINUED | OUTPATIENT
Start: 2022-11-15 | End: 2022-11-15 | Stop reason: HOSPADM

## 2022-11-15 RX ORDER — METOCLOPRAMIDE HYDROCHLORIDE 5 MG/ML
10 INJECTION INTRAMUSCULAR; INTRAVENOUS EVERY 6 HOURS PRN
Status: DISCONTINUED | OUTPATIENT
Start: 2022-11-15 | End: 2022-11-15 | Stop reason: HOSPADM

## 2022-11-15 RX ORDER — IBUPROFEN 800 MG/1
800 TABLET, FILM COATED ORAL
Status: DISCONTINUED | OUTPATIENT
Start: 2022-11-15 | End: 2022-11-16 | Stop reason: HOSPADM

## 2022-11-15 RX ORDER — CARBOPROST TROMETHAMINE 250 UG/ML
250 INJECTION, SOLUTION INTRAMUSCULAR
Status: DISCONTINUED | OUTPATIENT
Start: 2022-11-15 | End: 2022-11-15 | Stop reason: HOSPADM

## 2022-11-15 RX ORDER — BISACODYL 10 MG
10 SUPPOSITORY, RECTAL RECTAL DAILY PRN
Status: DISCONTINUED | OUTPATIENT
Start: 2022-11-15 | End: 2022-11-16 | Stop reason: HOSPADM

## 2022-11-15 RX ADMIN — WITCH HAZEL: 500 SOLUTION RECTAL; TOPICAL at 09:24

## 2022-11-15 RX ADMIN — ACETAMINOPHEN 650 MG: 325 TABLET, FILM COATED ORAL at 11:30

## 2022-11-15 RX ADMIN — BENZOCAINE AND LEVOMENTHOL: 200; 5 SPRAY TOPICAL at 09:24

## 2022-11-15 RX ADMIN — OXYTOCIN 10 UNITS: 10 INJECTION, SOLUTION INTRAMUSCULAR; INTRAVENOUS at 02:47

## 2022-11-15 RX ADMIN — DOCUSATE SODIUM 100 MG: 100 CAPSULE, LIQUID FILLED ORAL at 08:19

## 2022-11-15 RX ADMIN — IBUPROFEN 800 MG: 800 TABLET ORAL at 04:50

## 2022-11-15 RX ADMIN — IBUPROFEN 800 MG: 800 TABLET ORAL at 20:00

## 2022-11-15 ASSESSMENT — ACTIVITIES OF DAILY LIVING (ADL)
ADLS_ACUITY_SCORE: 18
ADLS_ACUITY_SCORE: 35
ADLS_ACUITY_SCORE: 18
DOING_ERRANDS_INDEPENDENTLY_DIFFICULTY: NO
ADLS_ACUITY_SCORE: 18
TOILETING_ISSUES: NO
CHANGE_IN_FUNCTIONAL_STATUS_SINCE_ONSET_OF_CURRENT_ILLNESS/INJURY: NO
WEAR_GLASSES_OR_BLIND: NO
ADLS_ACUITY_SCORE: 18
HEARING_DIFFICULTY_OR_DEAF: NO
ADLS_ACUITY_SCORE: 18
WALKING_OR_CLIMBING_STAIRS_DIFFICULTY: NO
DRESSING/BATHING_DIFFICULTY: NO
ADLS_ACUITY_SCORE: 18
CONCENTRATING,_REMEMBERING_OR_MAKING_DECISIONS_DIFFICULTY: NO
ADLS_ACUITY_SCORE: 18
FALL_HISTORY_WITHIN_LAST_SIX_MONTHS: NO
DIFFICULTY_COMMUNICATING: NO
ADLS_ACUITY_SCORE: 18
DIFFICULTY_EATING/SWALLOWING: NO

## 2022-11-15 NOTE — PLAN OF CARE
Problem: Postpartum (Vaginal Delivery)  Goal: Successful Maternal Role Transition  Outcome: Adequate for Care Transition   Mom bonding well with baby, attentive to all needs, active in all cares.      Problem: Pain Acute  Goal: Optimal Pain Control and Function  11/15/2022 0847 by Linda Bhatia, RN  Outcome: Progressing    Pt denies pain or the need for pain meds. Offered Tucks/Spray etc. Up independently in room. Firm, midline, light flow. Vss. Assessment WNL.    Mom HX, GDM-Last BG- 98, 92. Ok to discontinue POCT BG per Resident at this time.    ROP/Birth Certificate complete  Mood Assessment filed     Linda Bhatia, RN         3

## 2022-11-15 NOTE — PLAN OF CARE
Problem: Plan of Care - These are the overarching goals to be used throughout the patient stay.    Goal: Readiness for Transition of Care  Intervention: Mutually Develop Transition Plan  Recent Flowsheet Documentation  Taken 11/15/2022 0354 by Stephanie Ramos, RN  Equipment Currently Used at Home: none

## 2022-11-15 NOTE — PROGRESS NOTES
"Postpartum Day 0: Vaginal Delivery    Subjective:  The patient feels well. The patient has no emotional concerns. Pain is well controlled with current medications. The patient is ambulating well. She is tolerating a normal diet,  voiding and passing flatus.The amount and color of the lochia is appropriate for the duration of recovery, patient denies clots. The baby is well. Bottle feeding, going well.     Objective   /65 (BP Location: Right arm, Patient Position: Sitting, Cuff Size: Adult Regular)   Pulse 57   Temp 97.9  F (36.6  C) (Oral)   Resp 18   Ht 1.6 m (5' 3\")   Wt 87.5 kg (193 lb)   LMP 2022   SpO2 97%   Breastfeeding Unknown   BMI 34.19 kg/m    General: in no acute distress  Abdomen: soft, NT, fundus below umbilicus and firm  : deferred  Ext: no edema  Skin: good color    Impression:   27 year old  female who delivered via spontaneous vaginal delivery at 39w2d, currently doing well postpartum.    Plan:   -continue current care  -anticipate home tomorrow  -contraception OCP  -f/u in 2 weeks with PCP    Caridad Alejo MD  [facility] Medicine Resident   Pager #: [phone]      Precepted patient with Dr. Ghislaine Rodriguez.    "

## 2022-11-15 NOTE — H&P
OB Admission H&P  Date: 11/15/2022  Time: 2:58 AM  Maria Elena Oliva,  1995, MRN [id number]  PCP: Emmie Vizcarra, [phone]    CC: contractions    HPI: Maria Elena Oliva is a 27 year old year old  at 39w2d weeks by first trimester ultrasound..  Presenting with regular and painful contractions since 0100 this morning.  She denies leakage of fluid and vaginal bleeding and reports good fetal movement.    Prenatal Complications: Previously complicated by gestational diabetes on insulin.  Fairly controlled.    ROS: She denies fevers, chills, chest pain, shortness of breath, nausea, vomiting, or dysuria.    OB/Gyn History  39w2d by 8 week ultrasound/LMP  OB History    Para Term  AB Living   5 2 2 0 2 2   SAB IAB Ectopic Multiple Live Births   1 0 0 0 2      # Outcome Date GA Lbr Krishna/2nd Weight Sex Delivery Anes PTL Lv   5 Current            4 Term 19 39w1d 03:05 / 00:04 3.16 kg (6 lb 15.5 oz) F Vag-Spont None N IKE      Birth Comments: nuchal cord delivered through, 2nd degree laceration,EBL 112ml      Complications: Nuchal cord, single gestation      Name: JOEFEMALE-MARIA ELENA      Apgar1: 8  Apgar5: 9   3 Term 18 39w4d / 00:39 3.657 kg (8 lb 1 oz) M Vag-Spont None N IKE      Birth Comments: no complications noted. Length- 20.25 inches and head-33cm. Perineal laceration-2nd degree, labial laceration. EBL 200ml      Name: JOEMALE-MARIA ELENA      Apgar1: 8  Apgar5: 9   2 SAB 05/10/17     SAB      1 AB               Obstetric Comments   First pregnancy was a SAB at approximately 12 weeks (May 2017)      Above comment referring to her 2nd preg. First preg was  which ended with an . Ramiro RN       Physical Exam:  LMP 2022     Gen: no acute distress, in active labor.  Breathing through contractions.  CV: acyanotic.  Regular rate and rhythm.  Pulm: unlabored respirations   Abd: gravid, soft, nontender   Extremities: soft, nontender    Membrane status: Intact  Cervical Exam: 8-9  /90%/0 station (per RN)   Cervical characteristics     Presentation   Vertex  FHR: -Category 1 strip.  [place]:  q 2 min    Prenatal Labs  Blood type: O+  GBS negative   Rubella immune, Hep B negative, HIV negative, RPR non-reactive   GC / CT negative       Impression:  Rashmi Oliva is a 27 year old year old  at 39w2d weeks admitted for active labor, pregnancy complicated by gestational diabetes on insulin.  Fairly well controlled.. Fetal status reassuring..  Presented at 8 to 9 cm with regular contractions.  Membranes intact.  We will plan to proceed with vaginal delivery.    Plan:     Proceed with vaginal delivery.                                                    ,Will try breast-feeding may supplement with formula    Anesthesia during labor plan: None    Expecting baby boy      Gestational diabetes A2  On 17 units of Lantus daily.  Fairly well controlled.  -Plan for postpartum management.    Yaya Weeks MD PGY-2  [facility]       11/15/2022, 2:58 AM    Precepted patient with Dr. Liliane Desai.  ------------------------------------------------------------------------------------------------------------    Pertinent Labs  Office Visit on 2022   Component Date Value Ref Range Status     Hemoglobin A1C 2022 6.4 (H)  0.0 - 5.6 % Final    Normal <5.7%   Prediabetes 5.7-6.4%    Diabetes 6.5% or higher     Note: Adopted from ADA consensus guidelines.   Office Visit on 10/26/2022   Component Date Value Ref Range Status     Group B Strep PCR 10/26/2022 Negative  Negative Final    Presumed negative for Streptococcus agalactiae (Group B Streptococcus) or the number of organisms may be below the limit of detection of the assay.   Office Visit on 10/12/2022   Component Date Value Ref Range Status     Color Urine 10/12/2022 Yellow  Colorless, Straw, Light Yellow, Yellow Final     Appearance Urine 10/12/2022 Clear  Clear Final     Glucose Urine 10/12/2022 Negative  Negative mg/dL Final      Bilirubin Urine 10/12/2022 Negative  Negative Final     Ketones Urine 10/12/2022 Negative  Negative mg/dL Final     Specific Gravity Urine 10/12/2022 1.015  1.003 - 1.035 Final     Blood Urine 10/12/2022 Negative  Negative Final     pH Urine 10/12/2022 7.0  5.0 - 7.0 Final     Protein Albumin Urine 10/12/2022 Negative  Negative mg/dL Final     Urobilinogen Urine 10/12/2022 0.2  0.2, 1.0 E.U./dL Final     Nitrite Urine 10/12/2022 Negative  Negative Final     Leukocyte Esterase Urine 10/12/2022 Trace (A)  Negative Final     Bacteria Urine 10/12/2022 Few (A)  None Seen /HPF Final     RBC Urine 10/12/2022 None Seen  0-2 /HPF /HPF Final     WBC Urine 10/12/2022 0-5  0-5 /HPF /HPF Final     Squamous Epithelials Urine 10/12/2022 Few (A)  None Seen /LPF Final     Culture 10/12/2022 <10,000 CFU/mL Mixture of urogenital yuko   Final   Office Visit on 07/14/2022   Component Date Value Ref Range Status     Glu Gest Screen 1hr 50g 07/14/2022 167 (H)  70 - 129 mg/dL Final     Treponema Antibody Total 07/14/2022 Nonreactive  Nonreactive Final     Hemoglobin 07/14/2022 12.3  11.7 - 15.7 g/dL Final     TSH 07/14/2022 0.77  0.30 - 4.20 uIU/mL Final   Office Visit on 07/08/2022   Component Date Value Ref Range Status     Glucose 07/08/2022 111 (H)  70 - 99 mg/dL Final   Office Visit on 05/31/2022   Component Date Value Ref Range Status     Alpha Feto Protein 05/31/2022 23  ng/mL Final     MoM for AFP 05/31/2022 0.86   Final     Estriol 05/31/2022 0.80  ng/mL Final     MoM uE3 05/31/2022 1.12   Final     hCG 2nd Trimester 05/31/2022 57383  IU/L Final     MoM hCG 2nd Trimester 05/31/2022 1.23   Final     Dimeric Inhibin A 05/31/2022 146  pg/mL Final     MoM Dimeric Inhibin A 05/31/2022 0.96   Final     AFP Interpretation 05/31/2022 Screen Neg   Final    Comment: INTERPRETATION: SCREEN NEGATIVE                                Neural Tube Defects (NTD)   Negative       Down syndrome (DS)          Negative       Trisomy 18 (T18)             Negative                                                                 Pre-Test     Post-Test      Cutoff                                       Neural Tube Defects Risks   1:1030       < 1:09415    1:250            Down Syndrome Risks         1:979        1:6940       1:150            Trisomy 18 Risks            1:3810       < 1:34130    1:100                                          Comments:                                                   The risk of an open neural tube defect is less than the  screening cut-off.                                          The risk of Down syndrome is less than the screening  cut-off.                                                    The risk of trisomy 18 is less than the screening cut-off.    This test was developed and its performance characteristics   determined by [facility]. It has not been cleared or   approved by the US Food and Drug Administration. This test   was performed in a CLIA certified laboratory and is   intended for clinical purposes.     Maternal [age] Delivery 6/31/2122 26[age]7  yr Final     Patient Weight 05/31/2022 184.0 lbs.   Final     Estimated Due Date 05/31/2022 11-20-22   Final     Gestational Age Calculated 05/31/2022 15 wks, 2 days   Final     Dating 05/31/2022 Ultrasound   Final     Num of Fetuses 05/31/2022 [place]   Final     Maternal Race 05/31/2022 Nonblack   Final     Insulin Diabetic 05/31/2022 No   Final     Smoking Status 05/31/2022 No   Final     Fam History of NTD 05/31/2022 No   Final     Family History of Aneuploidy 05/31/2022 No   Final     Specimen Iteration 05/31/2022 See Note   Final    Initial sample     Electronic Enhanced Report 05/31/2022 See Note   Final    Authorized individuals can access the [employer]   Enhanced Report using the following link:      [email or web address]  Performed By: [facility]  [address] [place] [id number]  [facility]  Director: Stephanie Grider MD   Office Visit on 05/03/2022   Component Date Value Ref Range Status     ABO/RH(D) 05/03/2022 O POS   Final     SPECIMEN EXPIRATION DATE 05/03/2022 [id number]   Final     Antibody Screen 05/03/2022 Negative  Negative Final     SPECIMEN EXPIRATION DATE 05/03/2022 [id number]   Final     WBC Count 05/03/2022 8.8  4.0 - 11.0 10e3/uL Final     RBC Count 05/03/2022 5.05  3.80 - 5.20 10e6/uL Final     Hemoglobin 05/03/2022 13.9  11.7 - 15.7 g/dL Final     Hematocrit 05/03/2022 41.5  35.0 - 47.0 % Final     MCV 05/03/2022 82  78 - 100 fL Final     MCH 05/03/2022 27.5  26.5 - 33.0 pg Final     MCHC 05/03/2022 33.5  31.5 - 36.5 g/dL Final     RDW 05/03/2022 13.0  10.0 - 15.0 % Final     Platelet Count 05/03/2022 353  150 - 450 10e3/uL Final     Culture 05/03/2022 50,000-100,000 CFU/mL Mixture of urogenital yuko   Final     Hepatitis B Surface Antigen 05/03/2022 Nonreactive  Nonreactive Final     HIV Antigen Antibody Combo 05/03/2022 Negative  Negative Final     Rubella Stephanie IgG Instrument Value 05/03/2022 1.75  <0.90 Index Final     Rubella Antibody IgG 05/03/2022 Positive   Final    Suggests previous exposure or immunization and probable immunity.     Treponema Antibody Total 05/03/2022 Nonreactive  Nonreactive Final     Glucose Whole Blood 05/03/2022 98  60 - 99 mg/dL Final     Neisseria gonorrhoeae 05/03/2022 Negative  Negative Final    Negative for N. gonorrhoeae rRNA by transcription mediated amplification. A negative result by transcription mediated amplification does not preclude the presence of C. trachomatis infection because results are dependent on proper and adequate collection, absence of inhibitors and sufficient rRNA to be detected.     Chlamydia trachomatis 05/03/2022 Negative  Negative Final    A negative result by transcription mediated amplification does not preclude the presence of C. trachomatis infection because results are dependent on proper and adequate  collection, absence of inhibitors and sufficient rRNA to be detected.     Interpretation 05/03/2022 Negative for Intraepithelial Lesion or Malignancy (NILM)    Final     Comment 05/03/2022    Final                    Value:This result contains rich text formatting which cannot be displayed here.     Specimen Adequacy 05/03/2022 Satisfactory for evaluation, endocervical/transformation zone component present   Final     Clinical Information 05/03/2022    Final                    Value:This result contains rich text formatting which cannot be displayed here.     LMP/Menopause Date 05/03/2022    Final                    Value:This result contains rich text formatting which cannot be displayed here.     Reflex Testing 05/03/2022 Yes if ASCUS   Final     Previous Abnormal? 05/03/2022    Final                    Value:This result contains rich text formatting which cannot be displayed here.     Performing Labs 05/03/2022    Final                    Value:This result contains rich text formatting which cannot be displayed here.   Office Visit on 04/01/2022   Component Date Value Ref Range Status     hCG Quantitative 04/01/2022 87,566 (H)  0 - 4 mlU/mL Final    Non-pregnant female . . . . . . . . . . . . . 0-4 (<5) mIU/mL  Equivocal result for early pregnancy . . . . 5-24 mIU/mL  Values in pregnancy should double every 2-3 days for the first 6 weeks. Patients with very low levels of hCG should be resampled and retested after 48 hours.   For diagnostic purposes, hCG results should be used   conjunction with other data.   If the hCG level is inconsistent with clinical evidence,   results should be confirmed by an alternate hCG method.   This assay is approved for use in the early detection   of pregnancy only. It is not approved for any other   uses such as tumor marker screening or monitoring.     Color Urine 04/01/2022 Yellow  Colorless, Straw, Light Yellow, Yellow Final     Appearance Urine 04/01/2022 Clear  Clear Final      Glucose Urine 04/01/2022 Negative  Negative mg/dL Final     Bilirubin Urine 04/01/2022 Negative  Negative Final     Ketones Urine 04/01/2022 40 (A)  Negative mg/dL Final     Specific Gravity Urine 04/01/2022 >=1.030  1.003 - 1.035 Final     Blood Urine 04/01/2022 Negative  Negative Final     pH Urine 04/01/2022 6.5  5.0 - 7.0 Final     Protein Albumin Urine 04/01/2022 Trace (A)  Negative mg/dL Final     Urobilinogen Urine 04/01/2022 0.2  0.2, 1.0 E.U./dL Final     Nitrite Urine 04/01/2022 Negative  Negative Final     Leukocyte Esterase Urine 04/01/2022 Negative  Negative Final     Bacteria Urine 04/01/2022 Few (A)  None Seen /HPF Final     RBC Urine 04/01/2022 None Seen  0-2 /HPF /HPF Final     WBC Urine 04/01/2022 0-5  0-5 /HPF /HPF Final     Squamous Epithelials Urine 04/01/2022 Few (A)  None Seen /LPF Final     Mucus Urine 04/01/2022 Present (A)  None Seen /LPF Final   Office Visit on 03/18/2022   Component Date Value Ref Range Status     hCG Urine Qualitative 03/18/2022 Positive (A)  Negative Final    This test is for screening purposes.  Results should be interpreted along with the clinical picture.  Confirmation testing is available if warranted by ordering [id number], HCG Quantitative Pregnancy.       [facility] History:  [facility]unfilled[facility]    Surgical History:   has a past surgical history that includes Surgical Pathology Exam (2015).    Social History  Reviewed, and she  reports that she quit smoking about 5 years ago. Her smoking use included cigarettes. She has never used smokeless tobacco. She reports that she does not currently use drugs after having used the following drugs: Marijuana. She reports that she does not drink alcohol.   Smoking: None   Alcohol: None   Illicit drug use: None    Family History:   Family History   Problem Relation Age of Onset     Depression Mother      Diabetes Father         bilateral legs and 2-3 fingers on bilateral hands were amputated     Hypertension Father       Depression Father      Kidney Disease Father         dialysis secondary to kidney failure       Allergies   Allergen Reactions     Banana Swelling     Swollen throat     Sulfa Drugs Hives       Medications Prior to Admission   Medication Sig Dispense Refill Last Dose     blood glucose (NO BRAND SPECIFIED) lancets standard Use to test blood sugar 4 times daily or as directed. 1 each 3      blood glucose (NO BRAND SPECIFIED) test strip Use to test blood sugar 4 times daily or as directed. 100 strip 3      blood glucose monitoring (NO BRAND SPECIFIED) meter device kit Use to test blood sugar 4 times daily or as directed. 1 kit 0      blood glucose monitoring (SOFTCLIX) lancets 4 each daily        famotidine (PEPCID) 20 MG tablet Take 1 tablet (20 mg) by mouth 2 times daily 90 tablet 3      insulin glargine (LANTUS PEN) 100 UNIT/ML pen Inject 17 Units Subcutaneous At Bedtime 15 mL 0      insulin pen needle (32G X 4 MM) 32G X 4 MM miscellaneous Use 1 pen needles daily or as directed. 100 each 3      omeprazole (PRILOSEC OTC) 20 MG EC tablet Take 1 tablet (20 mg) by mouth daily 60 tablet 1      omeprazole (PRILOSEC) 20 MG DR capsule Take 20 mg by mouth daily as needed        ondansetron (ZOFRAN ODT) 4 MG ODT tab Take 1 tablet (4 mg) by mouth every 8 hours as needed for nausea 90 tablet 0      Prenatal Vit-Fe Fumarate-FA (PRENATAL MULTIVITAMIN W/IRON) 27-0.8 MG tablet Take 1 tablet by mouth daily 90 tablet 3      pyridOXINE (VITAMIN B6) 25 MG tablet Take 1 tablet (25 mg) by mouth 3 times daily 90 tablet 0

## 2022-11-15 NOTE — L&D DELIVERY NOTE
OB Vaginal Delivery Note    Rashmi Oliva MRN# [id number]   Age: 27 year old YOB: 1995       GA: 39w2d  GP:   Labor Complications: None   EBL: 75  mL  Delivery QBL:    Delivery Type: Vaginal, Spontaneous   ROM to Delivery Time: (Delivered) Minutes: 0   Weight: 3.5 kg (7 lb 11.5 oz)    1 Minute 5 Minute 10 Minute   Apgar Totals: 8   9        RAMA BEAR;TUAN SALMERON;MAXWELL HUNT     Delivery Details:  Rashmi Oliva, a 27 year old  female delivered a viable infant with apgars of 8  and 9 .  She presented to [facility] in active labor.  Delivery was via vaginal, spontaneous  to a sterile field under none  anesthesia. Infant delivered in vertex  right  occiput  anterior  position. Anterior and posterior shoulders delivered without difficulty. The cord was clamped, cut twice and 3 vessels  were noted. Cord blood was obtained in routine fashion with the following disposition: lab .      Cord complications: none   Placenta delivered at 11/15/2022  2:45 AM . Placental disposition was Hospital disposal . Fundal massage performed and fundus found to be firm.     Episiotomy: none    Perineum, vagina, cervix were inspected, and the following lacerations were noted:   Perineal lacerations: 2nd , Small               Any lacerations were repaired in the usual fashion    Excellent hemostasis was noted. Needle count correct. Infant and patient in delivery room in good and stable condition.        Vance Oliva [[id number]]    Labor Event Times    Labor onset date: 11/15/22 Onset time:  1:00 AM   Dilation complete date: 11/15/22 Complete time:  2:30 AM   Start pushing date/time: 11/15/2022 0238      Labor Events     labor?: No   steroids: None  Labor Type: Spontaneous  Predominate monitoring during 1st stage: continuous electronic fetal monitoring     Antibiotics received during labor?: No     Rupture identifier: Sac 1  Rupture date/time: 11/15/22 0241   Rupture type:  "Artificial Rupture of Membranes  Fluid color: Meconium  Fluid odor: Normal     Augmentation: None  1:1 continuous labor support provided by?: RN       Delivery/Placenta Date and Time    Delivery Date: 11/15/22 Delivery Time:  2:41 AM   Placenta Date/Time: 11/15/2022  2:45 AM  Oxytocin given at the time of delivery: after delivery of placenta  Delivering clinician: Yaya Weeks MD   Other personnel present at delivery:  Provider Role   Donita Mckeon MD Obstetrician   Stephanie Ramos RN Registered Nurse   Socorro Phelps RN Registered Nurse         Vaginal Counts          Needles Suture Needles Sponges (RETIRED) Instruments   Initial counts       Added to count       Relief counts       Final counts             Placed during labor Accounted for at the end of labor   FSE NA NA   IUPC NA NA   Cervidil NA NA                     Apgars    Living status: Living   1 Minute 5 Minute 10 Minute 15 Minute 20 Minute   Skin color: 0  1       Heart rate: 2  2       Reflex irritability: 2  2       Muscle tone: 2  2       Respiratory effort: 2  2       Total: 8  9       Apgars assigned by: GILBERT BLACKMAN     Cord    Vessels: 3 Vessels    Cord Complications: None               Cord Blood Disposition: Lab    Gases Sent?: No    Delayed cord clamping?: Yes    Cord Clamping Delay (seconds):  seconds       [place] Resuscitation    Methods: None      Measurements    Weight: 7 lb 11.5 oz Length: 1' 9\"   Head circumference: 34.3 cm       Skin to Skin and Feeding Plan    Skin to skin initiation date/time: 1841    Skin to skin with: Mother  Skin to skin end date/time: 1841       Labor Events and Shoulder Dystocia    Fetal Tracing Prior to Delivery: Category 1  Shoulder dystocia present?: Neg     Delivery (Maternal) (Provider to Complete) ([id number])    Episiotomy: None  Perineal lacerations: 2nd Repaired?: Yes   Est. blood loss (mL): 75  Repair suture: 3-0 Vicryl  Genital tract inspection done: Pos     Blood Loss  " Mother: Rashmi Oliva #[id number]   Start of Mother's Information    Delivery Blood Loss  11/15/22 0100 - 11/15/22 0328    EBL (mL) Hospital Encounter 75 mL    Total  75 mL         End of Mother's Information  Mother: Rashmi Oliva #[id number]          Delivery - Provider to Complete ([id number])    Delivering clinician: Yaya Weeks MD  Attempted Delivery Types (Choose all that apply): Spontaneous Vaginal Delivery  Delivery Type (Choose the 1 that will go to the Birth History): Vaginal, Spontaneous                   Other personnel:  Provider Role   Donita Mckeon MD Obstetrician   Stephanie Ramos RN Registered Nurse   Socorro Phelps RN Registered Nurse                Placenta    Date/Time: 11/15/2022  2:45 AM  Removal: Spontaneous  Disposition: Hospital disposal           Anesthesia    Method: None                Presentation and Position    Presentation: Vertex    Position: Right Occiput Anterior                 Yaya Weeks MD

## 2022-11-15 NOTE — TELEPHONE ENCOUNTER
Patient called concerned about painful contractions.  She is a  at 39w and 2d based on first trimester ultrasound.  Pregnancy complicated by gestational diabetes on insulin.  She is scheduled for induction this morning.    Patient began experiencing  contractions around 1 AM this morning.  Last about 30 seconds at a time.  2 to 3 minutes in between each contraction.  They are painful, has to breathe through them.  Denies any loss of fluid.  Is feeling baby move.  Feeling well otherwise.    With these painful contractions, recommend she come in to be evaluated for labor.  Otherwise, would be coming in around 730 this morning for an induction.  We will evaluate her when she arrives and determine a plan moving forward.    Yaya Weeks MD PGY-2  [facility]

## 2022-11-15 NOTE — PLAN OF CARE
Problem: Postpartum (Vaginal Delivery)  Goal: Successful Maternal Role Transition  Outcome: Progressing   Pt vs are WNL. Pain 2/10 taking Ibuprofen. Has been up and voided. Has had bath. Ambulates well. Bottle feeding formula. Bonding well with baby.

## 2022-11-16 VITALS
HEART RATE: 65 BPM | BODY MASS INDEX: 34.2 KG/M2 | DIASTOLIC BLOOD PRESSURE: 64 MMHG | TEMPERATURE: 97.9 F | RESPIRATION RATE: 16 BRPM | SYSTOLIC BLOOD PRESSURE: 121 MMHG | OXYGEN SATURATION: 99 % | WEIGHT: 193 LBS | HEIGHT: 63 IN

## 2022-11-16 PROCEDURE — 99238 HOSP IP/OBS DSCHRG MGMT 30/<: CPT | Mod: GC | Performed by: STUDENT IN AN ORGANIZED HEALTH CARE EDUCATION/TRAINING PROGRAM

## 2022-11-16 PROCEDURE — 250N000013 HC RX MED GY IP 250 OP 250 PS 637

## 2022-11-16 RX ORDER — SENNOSIDES 8.6 MG
1 TABLET ORAL DAILY
Qty: 30 TABLET | Refills: 0 | Status: SHIPPED | OUTPATIENT
Start: 2022-11-16 | End: 2022-11-29

## 2022-11-16 RX ORDER — IBUPROFEN 800 MG/1
800 TABLET, FILM COATED ORAL EVERY 6 HOURS PRN
Qty: 30 TABLET | Refills: 0 | Status: SHIPPED | OUTPATIENT
Start: 2022-11-16 | End: 2023-09-11

## 2022-11-16 RX ADMIN — WITCH HAZEL: 500 SOLUTION RECTAL; TOPICAL at 07:43

## 2022-11-16 RX ADMIN — IBUPROFEN 800 MG: 800 TABLET ORAL at 07:43

## 2022-11-16 RX ADMIN — DOCUSATE SODIUM 100 MG: 100 CAPSULE, LIQUID FILLED ORAL at 07:45

## 2022-11-16 ASSESSMENT — ACTIVITIES OF DAILY LIVING (ADL)
ADLS_ACUITY_SCORE: 18

## 2022-11-16 NOTE — PLAN OF CARE
Problem: Pain Acute  Goal: Optimal Pain Control and Function  Outcome: Progressing  Intervention: Develop Pain Management Plan  Recent Flowsheet Documentation  Taken 11/16/2022 0743 by Linda Bhatia, RN  Pain Management Interventions: medication (see MAR)  Pt reports soreness with activity. Ibuprofen/Tucks/Spray prn.  Assessments WNL. VSS.      Problem: Postpartum (Vaginal Delivery)  Goal: Successful Maternal Role Transition  Outcome: Progressing  Pt bonding well with baby, attentive to needs.   Eager to discharge later today. Bath prior.    Linda Bahtia, RN

## 2022-11-16 NOTE — PLAN OF CARE
Problem: Postpartum (Vaginal Delivery)  Goal: Hemostasis  Outcome: Progressing     Problem: Postpartum (Vaginal Delivery)  Goal: Optimal Pain Control and Function  Outcome: Progressing   VSS this shift. Fundus firm, midline. Small amount of rubra lochia present on juanita pad. Pt is voiding without difficulty. Pt states pain is well controlled with PRN ibuprofen, tucks and perineal spray. Will continue to monitor.

## 2022-11-16 NOTE — DISCHARGE SUMMARY
"OB Discharge Summary  Date:  2022    Name:  Rashmi Oliva  :  1995  MRN:  [id number]    Admission Date:  11/15/2022  Delivery Date:  11/15/2022  2:41 AM   Gestational Age at Delivery:  39w2d  Discharge Date:      Principal Diagnosis:    Problem List Items Addressed This Visit    None  Visit Diagnoses     Care and examination of lactating mother    -  Primary    Relevant Orders    Breast pump - Manual/Electric        Other Diagnosis:  GDM A2, now with BG at control off of insulin    Conditions complicating Pregnancy:  Diabetes:  Gestational    Procedure(s) Performed:      Condition at Discharge:  good    Examination:  /59   Pulse 61   Temp 97.9  F (36.6  C) (Oral)   Resp 16   Ht 1.6 m (5' 3\")   Wt 87.5 kg (193 lb)   LMP 2022   SpO2 98%   Breastfeeding Unknown   BMI 34.19 kg/m     Gen: alert, normal interactions and behaviors  Lungs: Clear to auscultation, normal work of breathing.  ABD: Fundus firm below umbilicus, active bowel sounds  Ext:  lower extremity swelling, 2+ peripheral pulses      Discharge Medications:      Review of your medicines      START taking      Dose / Directions   ibuprofen 800 MG tablet  Commonly known as: ADVIL/MOTRIN  Used for: Routine postpartum follow-up      Dose: 800 mg  Take 1 tablet (800 mg) by mouth every 6 hours as needed for other (cramping)  Quantity: 30 tablet  Refills: 0     sennosides 8.6 MG tablet  Commonly known as: SENOKOT  Used for: Routine postpartum follow-up      Dose: 1 tablet  Take 1 tablet by mouth daily  Quantity: 30 tablet  Refills: 0        CONTINUE these medicines which have NOT CHANGED      Dose / Directions   blood glucose lancets standard  Commonly known as: NO BRAND SPECIFIED  Used for: Gestational diabetes mellitus (GDM) in second trimester, gestational diabetes method of control unspecified      Use to test blood sugar 4 times daily or as directed.  Quantity: 1 each  Refills: 3     blood glucose monitoring lancets      " Dose: 4 each  4 each daily  Refills: 0     blood glucose monitoring meter device kit  Commonly known as: NO BRAND SPECIFIED  Used for: Gestational diabetes mellitus (GDM) in second trimester, gestational diabetes method of control unspecified      Use to test blood sugar 4 times daily or as directed.  Quantity: 1 kit  Refills: 0     blood glucose test strip  Commonly known as: NO BRAND SPECIFIED  Used for: Gestational diabetes mellitus (GDM) in second trimester, gestational diabetes method of control unspecified      Use to test blood sugar 4 times daily or as directed.  Quantity: 100 strip  Refills: 3     famotidine 20 MG tablet  Commonly known as: PEPCID  Used for: Encounter for supervision of normal first pregnancy in first trimester, Nausea      Dose: 20 mg  Take 1 tablet (20 mg) by mouth 2 times daily  Quantity: 90 tablet  Refills: 3     insulin pen needle 32G X 4 MM miscellaneous  Commonly known as: 32G X 4 MM  Used for: Gestational diabetes mellitus (GDM) in second trimester, gestational diabetes method of control unspecified, High-risk pregnancy, unspecified trimester      Use 1 pen needles daily or as directed.  Quantity: 100 each  Refills: 3     prenatal multivitamin w/iron 27-0.8 MG tablet  Used for: Pregnancy examination or test, pregnancy unconfirmed      Dose: 1 tablet  Take 1 tablet by mouth daily  Quantity: 90 tablet  Refills: 3        STOP taking    insulin glargine 100 UNIT/ML pen  Commonly known as: LANTUS PEN        omeprazole 20 MG DR capsule  Commonly known as: priLOSEC        omeprazole 20 MG EC tablet  Commonly known as: priLOSEC OTC        ondansetron 4 MG ODT tab  Commonly known as: ZOFRAN ODT        pyridOXINE 25 MG tablet  Commonly known as: VITAMIN B6              Where to get your medicines      These medications were sent to [id number] - [place], [place] - [address] AT [facility] [address]  [address], [place] [address]    Phone:  [phone]     ibuprofen 800 MG tablet    sennosides 8.6 MG tablet         Discharge Plan:    Follow up with /KACIE: 2 weeks   Patient Instructions:      Physical activity: Regular    Diet:  Normal    Medication:  Ibuprofen as needed        Caridad Alejo MD  Pager # [phone]  [facility]      Precepted patient with Dr. Rosenstein..

## 2022-11-16 NOTE — DISCHARGE INSTRUCTIONS
Stop taking insulin. Keep checking blood sugars in the mornings before you eat.    Keep next OB appointment, bring baby to clinic for  check:   RETURN OB 8:40 AM   Caridad Alejo MD  [facility] [place]

## 2022-11-16 NOTE — PROGRESS NOTES
Outreach Nurse Note    Rashmi Oliva  [id number]  1995    Chart reviewed, discharge follow-up plan discussed with patient's bedside RN, needs assessed. Post-delivery follow-up appointment with  planned in 2 weeks at [facility] [place] [facility]. Patient, Rashmi, is reported to have support at home and feels ready to discharge today with .     Outreach nurse will continue to follow and assist with discharge planning as needed. No additional needs identified at this time.

## 2022-11-22 ENCOUNTER — TELEPHONE (OUTPATIENT)
Dept: FAMILY MEDICINE | Facility: CLINIC | Age: 27
End: 2022-11-22

## 2022-11-22 NOTE — TELEPHONE ENCOUNTER
[facility] phone call message- general phone call:    Reason for call: Ruben from [employer] called stating he has received the forms sent for medical leave for 12 weeks - if this is medically necessary he will be needing a call back and notes to support this because if vaginal delivery the standard medical leave time is only 6 weeks. If this is not medically necessary please send statement stating when patient will be returning to work. Please advise.    Fax# [phone]    Call back # [phone] ext. [phone] okay to leave VM - secured VM.    Return call needed: Yes    OK to leave a message on voice mail? Yes    Primary language: English      needed? No    Call taken on November 22, 2022 at 3:17 PM by Elina Dubois

## 2022-11-29 ENCOUNTER — OFFICE VISIT (OUTPATIENT)
Dept: FAMILY MEDICINE | Facility: CLINIC | Age: 27
End: 2022-11-29
Payer: COMMERCIAL

## 2022-11-29 ENCOUNTER — MEDICAL CORRESPONDENCE (OUTPATIENT)
Dept: HEALTH INFORMATION MANAGEMENT | Facility: CLINIC | Age: 27
End: 2022-11-29

## 2022-11-29 VITALS
SYSTOLIC BLOOD PRESSURE: 131 MMHG | WEIGHT: 177 LBS | DIASTOLIC BLOOD PRESSURE: 80 MMHG | TEMPERATURE: 97.7 F | BODY MASS INDEX: 32.57 KG/M2 | OXYGEN SATURATION: 96 % | HEIGHT: 62 IN | HEART RATE: 71 BPM | RESPIRATION RATE: 22 BRPM

## 2022-11-29 DIAGNOSIS — Z30.011 ENCOUNTER FOR INITIAL PRESCRIPTION OF CONTRACEPTIVE PILLS: ICD-10-CM

## 2022-11-29 PROBLEM — Z34.90 PREGNANCY: Status: RESOLVED | Noted: 2022-11-15 | Resolved: 2022-11-29

## 2022-11-29 PROCEDURE — 99213 OFFICE O/P EST LOW 20 MIN: CPT | Mod: GC | Performed by: STUDENT IN AN ORGANIZED HEALTH CARE EDUCATION/TRAINING PROGRAM

## 2022-11-29 RX ORDER — NORGESTIMATE AND ETHINYL ESTRADIOL 0.25-0.035
1 KIT ORAL DAILY
Qty: 90 TABLET | Refills: 3 | Status: SHIPPED | OUTPATIENT
Start: 2022-11-29 | End: 2023-03-24

## 2022-11-29 RX ORDER — SENNOSIDES 8.6 MG
1 TABLET ORAL DAILY
Qty: 30 TABLET | Refills: 0 | Status: SHIPPED | OUTPATIENT
Start: 2022-11-29 | End: 2022-12-22

## 2022-11-29 NOTE — PROGRESS NOTES
"  Assessment & Plan     Routine postpartum follow-up  Encounter for initial prescription of contraceptive pills  Doing well, plan to start OCP--not breastfeeding. No mood concerns. Fasting BG at goal, plan for A1c check in 4-6 weeks. Declines COVID and flu vaccination today.  - sennosides (SENOKOT) 8.6 MG tablet  Dispense: 30 tablet; Refill: 0  - norgestimate-ethinyl estradiol (ORTHO-CYCLEN) 0.25-35 MG-MCG tablet  Dispense: 90 tablet; Refill: 3    Return in about 6 weeks (around 1/10/2023).     Precepted with Dr. Rosenstein.    Caridad Alejo MD  [facility] [place]    Parag Caraballo is a 27 year old accompanied by her son, presenting for the following health issues:  Postpartum Care (Also needs FMLA forms for work)      HPI     Contraception  Planning for ocp, has taken previously in adolescence.     Postpartum  Finally getting into a sleeping schedule  [place] is 1.    Feels things are going well.    FMLA and return to work    GDMA2 in pregnancy  BG checks have been 87-90.  Has not been taking insulin--as was discussed.    A1c next visit     Still using stool softener, finally getting a softer more regular stool pattern.      Review of Systems   Constitutional, HEENT, cardiovascular, pulmonary, GI, , musculoskeletal, neuro, skin, endocrine and psych systems are negative, except as otherwise noted.      Objective    /80   Pulse 71   Temp 97.7  F (36.5  C)   Resp 22   Ht 1.575 m (5' 2\")   Wt 80.3 kg (177 lb)   LMP 01/28/2022   SpO2 96%   BMI 32.37 kg/m    Body mass index is 32.37 kg/m .  Physical Exam   GENERAL: healthy, alert and no distress  EYES: Eyes grossly normal to inspection, PERRL and conjunctivae and sclerae normal  RESP: normal work of breathing, no audible wheezes  MS: no gross musculoskeletal defects noted, no edema  SKIN: no suspicious lesions or rashes  NEURO: Normal strength and tone, mentation intact and speech normal  PSYCH: mentation appears normal, " affect normal/bright    Plan for A1c at next visit    ----- Service Performed and Documented by Resident or Fellow ------

## 2022-12-22 ENCOUNTER — MEDICAL CORRESPONDENCE (OUTPATIENT)
Dept: HEALTH INFORMATION MANAGEMENT | Facility: CLINIC | Age: 27
End: 2022-12-22

## 2022-12-22 ENCOUNTER — OFFICE VISIT (OUTPATIENT)
Dept: FAMILY MEDICINE | Facility: CLINIC | Age: 27
End: 2022-12-22
Payer: COMMERCIAL

## 2022-12-22 VITALS
TEMPERATURE: 97.8 F | HEIGHT: 61 IN | RESPIRATION RATE: 20 BRPM | HEART RATE: 66 BPM | DIASTOLIC BLOOD PRESSURE: 78 MMHG | SYSTOLIC BLOOD PRESSURE: 121 MMHG | OXYGEN SATURATION: 95 % | WEIGHT: 177 LBS | BODY MASS INDEX: 33.42 KG/M2

## 2022-12-22 DIAGNOSIS — O24.419 GESTATIONAL DIABETES MELLITUS (GDM) IN SECOND TRIMESTER, GESTATIONAL DIABETES METHOD OF CONTROL UNSPECIFIED: Primary | ICD-10-CM

## 2022-12-22 DIAGNOSIS — R73.03 PREDIABETES: ICD-10-CM

## 2022-12-22 DIAGNOSIS — Z11.59 NEED FOR HEPATITIS C SCREENING TEST: ICD-10-CM

## 2022-12-22 LAB — HBA1C MFR BLD: 5.9 % (ref 0–5.6)

## 2022-12-22 PROCEDURE — 86803 HEPATITIS C AB TEST: CPT | Performed by: STUDENT IN AN ORGANIZED HEALTH CARE EDUCATION/TRAINING PROGRAM

## 2022-12-22 PROCEDURE — 99213 OFFICE O/P EST LOW 20 MIN: CPT | Mod: GC | Performed by: STUDENT IN AN ORGANIZED HEALTH CARE EDUCATION/TRAINING PROGRAM

## 2022-12-22 PROCEDURE — 36415 COLL VENOUS BLD VENIPUNCTURE: CPT | Performed by: STUDENT IN AN ORGANIZED HEALTH CARE EDUCATION/TRAINING PROGRAM

## 2022-12-22 PROCEDURE — 83036 HEMOGLOBIN GLYCOSYLATED A1C: CPT | Performed by: STUDENT IN AN ORGANIZED HEALTH CARE EDUCATION/TRAINING PROGRAM

## 2022-12-23 LAB — HCV AB SERPL QL IA: NONREACTIVE

## 2022-12-27 ENCOUNTER — MEDICAL CORRESPONDENCE (OUTPATIENT)
Dept: HEALTH INFORMATION MANAGEMENT | Facility: CLINIC | Age: 27
End: 2022-12-27

## 2022-12-27 ENCOUNTER — TELEPHONE (OUTPATIENT)
Dept: FAMILY MEDICINE | Facility: CLINIC | Age: 27
End: 2022-12-27

## 2022-12-27 NOTE — TELEPHONE ENCOUNTER
Forms Request  Name of form/paperwork: [employer] return to work evaluation form  Do we have the form: yes   When is form needed by: N/A   How would you like the form returned: Fax  Fax: [phone]  Patient Notified form requests are processed in 10 business days: N/A  Okay to leave a detailed message? N/A

## 2023-01-05 NOTE — TELEPHONE ENCOUNTER
Forms returned and completed. Faxed to [phone], placed in medical records to be scanned into patient chart.

## 2023-03-24 ENCOUNTER — OFFICE VISIT (OUTPATIENT)
Dept: FAMILY MEDICINE | Facility: CLINIC | Age: 28
End: 2023-03-24
Payer: COMMERCIAL

## 2023-03-24 VITALS
BODY MASS INDEX: 32.96 KG/M2 | WEIGHT: 186 LBS | HEIGHT: 63 IN | HEART RATE: 83 BPM | DIASTOLIC BLOOD PRESSURE: 75 MMHG | SYSTOLIC BLOOD PRESSURE: 126 MMHG | TEMPERATURE: 98.6 F | OXYGEN SATURATION: 97 % | RESPIRATION RATE: 22 BRPM

## 2023-03-24 DIAGNOSIS — Z30.09 ENCOUNTER FOR OTHER GENERAL COUNSELING OR ADVICE ON CONTRACEPTION: ICD-10-CM

## 2023-03-24 DIAGNOSIS — L65.0 TELOGEN EFFLUVIUM: ICD-10-CM

## 2023-03-24 DIAGNOSIS — N91.2 AMENORRHEA: Primary | ICD-10-CM

## 2023-03-24 LAB — HCG UR QL: POSITIVE

## 2023-03-24 PROCEDURE — 36415 COLL VENOUS BLD VENIPUNCTURE: CPT | Performed by: STUDENT IN AN ORGANIZED HEALTH CARE EDUCATION/TRAINING PROGRAM

## 2023-03-24 PROCEDURE — 81025 URINE PREGNANCY TEST: CPT | Performed by: STUDENT IN AN ORGANIZED HEALTH CARE EDUCATION/TRAINING PROGRAM

## 2023-03-24 PROCEDURE — 99213 OFFICE O/P EST LOW 20 MIN: CPT | Mod: GC | Performed by: STUDENT IN AN ORGANIZED HEALTH CARE EDUCATION/TRAINING PROGRAM

## 2023-03-24 PROCEDURE — 84443 ASSAY THYROID STIM HORMONE: CPT | Performed by: STUDENT IN AN ORGANIZED HEALTH CARE EDUCATION/TRAINING PROGRAM

## 2023-03-24 NOTE — PROGRESS NOTES
Assessment & Plan     (N91.2) Amenorrhea  (primary encounter diagnosis)  Comment: Patient's LMP was 2/3/23, has not taken OCP due to morning nausea. She is not breastfeeding currently. Pregnancy test positive--will discuss at next visit as she left prior to result and further discussion.  Plan: HCG qualitative urine    (L65.0) Telogen effluvium  Comment: Patient is 4 months postpartum with 1 month of diffuse hair thinning and hair loss at home, most likely this is postpartum telogen effluvium. We advised the patient that this is not unexpected and should resolve on it's own in time. She has not had this with prior pregnancies which is unusual, so we will check her thyroid function today.  Plan: TSH with free T4 reflex    (Z30.09) Encounter for other general counseling or advice on contraception  Comment: Patient has not been taking OCP due to nausea. She does not want an IUD with concern of pain, and has had Nexplanon in the past but did not like that it took a long time to be removed. She is not trying to get pregnant at this time. She would like some more time to think through her options, so we will continue to discuss contraception options at future visit.  Plan: Discuss at future visit. Now in the setting of pregnancy--will need to discuss desired/not desired pregnancy and plan for contraception after this next pregnancy.  956  FUTURE APPOINTMENTS:       - Follow-up visit in 2 weeks.    Return in about 2 weeks (around 4/7/2023). Will discuss pregnancy and next steps at that time.    Nadir Soto, MS3    Precepted with Dr. Benítez.    I was present with the medical student who participated in the service and in the documentation of this note. I have verified the history and personally performed the physical exam and medical decision making, and have verified the content of the note, which accurately reflects my assessment of the patient and the plan of care.     Caridad Alejo MD  [facility]  "[place]    Subjective   Rashmi is a 28 year old, presenting for the following health issues:  Hair/Scalp Problem (loss)      HPI   - Started prenatal vitamin and shampoo again to help.  - Wondering if zinc could be low.  - Thinking may be pregnant - LMP 2/3/23. Wants to check. Has been having night-time nausea that was similar to prior pregnancies, noticed no other symptoms.    Concern - Hair loss  Onset: 1 month  Description: Hair loss, handful per day, large clumps. Hasn't changed shampoos.  Intensity: Nonstop  Progression of Symptoms:  constant  Accompanying Signs & Symptoms: None  Previous history of similar problem: None  Precipitating factors:        Worsened by: Brushing hair, showering  Alleviating factors:        Improved by: None  Therapies tried and outcome: None      Review of Systems   Constitutional, HEENT, cardiovascular, pulmonary, gi and gu systems are negative, except as otherwise noted.      Objective    /75   Pulse 83   Temp 98.6  F (37  C)   Resp 22   Ht 1.6 m (5' 3\")   Wt 84.4 kg (186 lb)   SpO2 97%   BMI 32.95 kg/m    Body mass index is 32.95 kg/m .  Physical Exam   GENERAL: healthy, alert and no distress  NECK: no adenopathy, no asymmetry, masses, or scars and thyroid normal to palpation  RESP: lungs clear to auscultation - no rales, rhonchi or wheezes  CV: regular rate and rhythm, normal S1 S2, no S3 or S4, no murmur, click or rub, no peripheral edema and peripheral pulses strong  ABDOMEN: soft, nontender, no hepatosplenomegaly, no masses and bowel sounds normal  MS: no gross musculoskeletal defects noted, no edema  SKIN: no suspicious lesions or rashes, hair quality is diffusely thin without pattern of loss, some balding along forehead. No erythema or rash on scalp. Home hair test is positive.  PSYCH: mentation appears normal, affect normal/bright    Results for orders placed or performed in visit on 03/24/23 (from the past 24 hour(s))   HCG qualitative urine   Result " Value Ref Range    hCG Urine Qualitative Positive (A) Negative

## 2023-03-25 LAB — TSH SERPL DL<=0.005 MIU/L-ACNC: 1.33 UIU/ML (ref 0.3–4.2)

## 2023-04-06 ENCOUNTER — MEDICAL CORRESPONDENCE (OUTPATIENT)
Dept: HEALTH INFORMATION MANAGEMENT | Facility: CLINIC | Age: 28
End: 2023-04-06
Payer: COMMERCIAL

## 2023-04-07 ENCOUNTER — OFFICE VISIT (OUTPATIENT)
Dept: FAMILY MEDICINE | Facility: CLINIC | Age: 28
End: 2023-04-07
Payer: COMMERCIAL

## 2023-04-07 VITALS
SYSTOLIC BLOOD PRESSURE: 127 MMHG | DIASTOLIC BLOOD PRESSURE: 84 MMHG | HEIGHT: 63 IN | TEMPERATURE: 97.9 F | RESPIRATION RATE: 16 BRPM | WEIGHT: 184.12 LBS | HEART RATE: 75 BPM | BODY MASS INDEX: 32.62 KG/M2 | OXYGEN SATURATION: 97 %

## 2023-04-07 DIAGNOSIS — Z32.01 PREGNANCY TEST POSITIVE: Primary | ICD-10-CM

## 2023-04-07 DIAGNOSIS — R11.2 NAUSEA AND VOMITING, UNSPECIFIED VOMITING TYPE: ICD-10-CM

## 2023-04-07 DIAGNOSIS — Z86.32 HISTORY OF GESTATIONAL DIABETES: ICD-10-CM

## 2023-04-07 LAB — HBA1C MFR BLD: 5.9 % (ref 0–5.6)

## 2023-04-07 PROCEDURE — 36415 COLL VENOUS BLD VENIPUNCTURE: CPT

## 2023-04-07 PROCEDURE — 99213 OFFICE O/P EST LOW 20 MIN: CPT | Mod: GC

## 2023-04-07 PROCEDURE — 83036 HEMOGLOBIN GLYCOSYLATED A1C: CPT

## 2023-04-07 RX ORDER — PRENATAL VIT/IRON FUM/FOLIC AC 27MG-0.8MG
1 TABLET ORAL DAILY
Qty: 90 TABLET | Refills: 3 | Status: SHIPPED | OUTPATIENT
Start: 2023-04-07

## 2023-04-07 RX ORDER — PYRIDOXINE HCL (VITAMIN B6) 25 MG
25 TABLET ORAL DAILY
Qty: 90 TABLET | Refills: 1 | Status: SHIPPED | OUTPATIENT
Start: 2023-04-07 | End: 2023-06-02

## 2023-04-07 NOTE — PROGRESS NOTES
Assessment & Plan     Pregnancy test positive  Nausea and vomiting, unspecified vomiting type  Patient presented with positive pregnancy test at last appointment two weeks ago. This pregnancy was a surprise because she was taking OCPs, but she is planning on continuing the pregnancy. Today, she is following up because she has not yet heard about any prenatal appointments. She notes that she has been experiencing nausea and vomiting, which she experienced with her last pregnancy. Vitamin B6 worked for her in the past; will resume this today. Discussed Rashmi with clinic RN; she will reach out to Rashmi next week to set her up with a continuity provider.   - Prenatal Vit-Fe Fumarate-FA (PRENATAL MULTIVITAMIN W/IRON) 27-0.8 MG tablet; Take 1 tablet by mouth daily  - pyridOXINE (VITAMIN B6) 25 MG tablet; Take 1 tablet (25 mg) by mouth daily    History of gestational diabetes  Patient had a history of gestational diabetes managed with medication during her last pregnancy. Will obtain A1c today to assess if patient has diabetes. If A1c is <6.5, will resume normal GDM testing between 24-28 weeks.   - Hemoglobin A1c    Emmie Vizcarra MD  [facility] [place]    Parag Caraballo is a 28 year old, presenting for the following health issues:  RECHECK (Follow up)        10/12/2022     2:06 PM   Additional Questions   Roomed by Alyssa   Accompanied by self     HPI     Follow-up   Seen on 3/24  for amenorrhea and contracpetion and hair loss; found to be pregnant at that appointment. Appointment today to discuss contraception versus continued pregnancy. Rashmi just had a baby in November.   - LMP: February 3rd, 2023   - Nauseous all the time, has been vomiting about every other day. Symptoms are worse in the evening. Had a lot of nausea and vomiting with her prior pregnancy. Used vitamin B6 with her last pregnancy.   - Spotting: None  - Breast tenderness: None  - Fatigue: None   - Three kids at home: 5 year  "old, 4 year old, 5 month old baby   - Surprise pregnancy; she was on birth control pills. Intends on continuing this pregnancy.   - Needs prenatal vitamin   - With previous pregnancy, experienced weight loss secondary to nausea/vomiting.   - Experiencing hair loss, hair comes out in clumps. Started in February. Had discussed this with Dr. Alejo at prior appointment, suspected related to postpartum telogen effluvium.     Review of Systems   Positive for N/V, negative for fatigue, vaginal bleeding and breast tenderness.       Objective    /84   Pulse 75   Temp 97.9  F (36.6  C) (Oral)   Resp 16   Ht 1.6 m (5' 3\")   Wt 83.5 kg (184 lb 1.9 oz)   SpO2 97%   BMI 32.62 kg/m    Body mass index is 32.62 kg/m .  Physical Exam   GENERAL: Healthy, alert and no distress  EYES: Eyes grossly normal to inspection.  No discharge or erythema, or obvious scleral/conjunctival abnormalities.  RESP: No audible wheeze, cough, or visible cyanosis.  No visible retractions or increased work of breathing.    SKIN: no suspicious lesions or rashes, hair quality is diffusely thin without pattern of loss, some balding along forehead. No erythema or rash on scalp.  NEURO: Cranial nerves grossly intact.  Mentation and speech appropriate for age.  PSYCH: Mentation appears normal, affect normal/bright, judgement and insight intact, normal speech and appearance well-groomed.          "

## 2023-04-07 NOTE — PATIENT INSTRUCTIONS
Congratulations! Antonina will call you early next week with next steps and is excited to help you through this process again.  I will prescribe a nausea medicine (B6) and a prenatal vitamin  We will test your A1c today to get a jump start on your blood sugar control

## 2023-04-07 NOTE — LETTER
RETURN TO WORK    4/7/2023    Re: Rashmi Oliva  1995      To Whom It May Concern:     Rashmi Oliva was seen in clinic today. She may return to work without restrictions on 4/7/23.         Restrictions:  None      Emmie Vizcarra MD  4/7/2023 8:45 AM

## 2023-04-13 ENCOUNTER — DOCUMENTATION ONLY (OUTPATIENT)
Dept: FAMILY MEDICINE | Facility: CLINIC | Age: 28
End: 2023-04-13
Payer: COMMERCIAL

## 2023-04-13 NOTE — PROGRESS NOTES
OB intake completed via phone with patient. Rahsmi is a 29 yo, Hmong, English speaking female,  with hx one miscarriage and one elective . Rashmi is returning for OB care, short interval between previous pregnancy- delivered 11/15/22 and this pregnancy. Second and third pregnancies were complicated by insulin dependant GDM. All previous pregnancies were delivered full term, normal, spontaneous, vaginal deliveries.  Current pregnancy was unplanned but desired by both patient and her . FOB/  is Bonilla Yuen a 36 yo, Hmong male, same FOB as her three children. Rashmi still works at a bank, her  is currently unemployed.     Sure LMP of 2/3/2023, menses regular in pattern. Based on LMP, DARLENE is 11/10/2023, today 9 w 4 d. Personal and family medical history reviewed, noted as unchanged. Rashmi endorses social alcohol intake, not often, last alcohol intake was for her birthday, 2022. Had 2 cans of beer and 1 cocktail. No known genetic disorders on either side of the families. No concerns voiced during intake today. Rashmi will start to modify diet and start to monitor blood sugars early in pregnancy. NOB is scheduled with Dr Christie. Ramiro RN    Average Risk Category  No significant risk factors: No    At Risk Category (up to 3)  Teen pregnancy: No  Poor social situation: No  Domestic abuse: No  Financial difficulties: No  Smoker: No  H/O  deliver: No  H/O drug abuse: No  Non-English speaking: No  Advanced maternal age: No  GDM risks: Yes  Previous C/S: No  H/O PIH: No  H/O STIs: No  H/O mental health concerns: No  Onset care > 20 weeks: No  Other: insulin dependant GDM x 2 previous pregnancies    High Risk Category (4 or more At Risk or)  Diabetes/GDM: Yes  Multiple gestation: No  Chronic hypertension: No  Significant hx of asthma: No  Fetal demise > 20 weeks: No  Positive tox screen: No  Current mental health treatment: No  Other: see above    Risk: High Risk, previous x 2 pregnancies  insulin dependant GDM, short interval between pregnancies.  Date determined: 4/11/2023

## 2023-04-21 ENCOUNTER — TELEPHONE (OUTPATIENT)
Dept: FAMILY MEDICINE | Facility: CLINIC | Age: 28
End: 2023-04-21

## 2023-04-21 NOTE — TELEPHONE ENCOUNTER
[facility] phone call message- general phone call:    Reason for call: Patient had an appointment thoday with , patient was waiting in the room for over an hour and a half. Patient had to leave so had to cancel appointment. Patient wanted RN to call her to reschedule. Please call and advise, thank you.     Return call needed: Yes    OK to leave a message on voice mail? Yes    Primary language: English      needed? No    Call taken on April 21, 2023 at 3:32 PM by Ashley Courtney

## 2023-05-02 ENCOUNTER — OFFICE VISIT (OUTPATIENT)
Dept: FAMILY MEDICINE | Facility: CLINIC | Age: 28
End: 2023-05-02
Payer: COMMERCIAL

## 2023-05-02 VITALS
WEIGHT: 185 LBS | OXYGEN SATURATION: 98 % | DIASTOLIC BLOOD PRESSURE: 76 MMHG | HEIGHT: 62 IN | BODY MASS INDEX: 34.04 KG/M2 | SYSTOLIC BLOOD PRESSURE: 114 MMHG | TEMPERATURE: 97.7 F | HEART RATE: 89 BPM | RESPIRATION RATE: 20 BRPM

## 2023-05-02 DIAGNOSIS — Z34.81 ENCOUNTER FOR SUPERVISION OF OTHER NORMAL PREGNANCY IN FIRST TRIMESTER: Primary | ICD-10-CM

## 2023-05-02 DIAGNOSIS — O21.0 MORNING SICKNESS: ICD-10-CM

## 2023-05-02 LAB
ABO/RH(D): NORMAL
ANTIBODY SCREEN: NEGATIVE
ERYTHROCYTE [DISTWIDTH] IN BLOOD BY AUTOMATED COUNT: 13.1 % (ref 10–15)
HCT VFR BLD AUTO: 40.2 % (ref 35–47)
HGB BLD-MCNC: 12.9 G/DL (ref 11.7–15.7)
MCH RBC QN AUTO: 26.9 PG (ref 26.5–33)
MCHC RBC AUTO-ENTMCNC: 32.1 G/DL (ref 31.5–36.5)
MCV RBC AUTO: 84 FL (ref 78–100)
PLATELET # BLD AUTO: 293 10E3/UL (ref 150–450)
RBC # BLD AUTO: 4.8 10E6/UL (ref 3.8–5.2)
SPECIMEN EXPIRATION DATE: NORMAL
SPECIMEN EXPIRATION DATE: NORMAL
T PALLIDUM AB SER QL: NONREACTIVE
WBC # BLD AUTO: 7.8 10E3/UL (ref 4–11)

## 2023-05-02 PROCEDURE — 36415 COLL VENOUS BLD VENIPUNCTURE: CPT

## 2023-05-02 PROCEDURE — 99207 PR FIRST OB VISIT: CPT | Mod: GC

## 2023-05-02 PROCEDURE — 86850 RBC ANTIBODY SCREEN: CPT

## 2023-05-02 PROCEDURE — 86901 BLOOD TYPING SEROLOGIC RH(D): CPT

## 2023-05-02 PROCEDURE — 86762 RUBELLA ANTIBODY: CPT

## 2023-05-02 PROCEDURE — 86900 BLOOD TYPING SEROLOGIC ABO: CPT

## 2023-05-02 PROCEDURE — 87340 HEPATITIS B SURFACE AG IA: CPT

## 2023-05-02 PROCEDURE — 85027 COMPLETE CBC AUTOMATED: CPT

## 2023-05-02 PROCEDURE — 86780 TREPONEMA PALLIDUM: CPT

## 2023-05-02 PROCEDURE — 87389 HIV-1 AG W/HIV-1&-2 AB AG IA: CPT

## 2023-05-02 NOTE — LETTER
May 23, 2023      Rashmi Oliva  [address] [place] [address]        Dear ,    We are writing to inform you of your test results.    Good news! Your recent prenatal lab tests all looked normal. I look forward to seeing you again in clinic for your next appointment. Please call if you have any questions or concerns.      Resulted Orders   ABO/Rh Type-HML   Result Value Ref Range    ABO/RH(D) O POS     SPECIMEN EXPIRATION DATE [id number]    Antibody Screen   Result Value Ref Range    Antibody Screen Negative Negative    SPECIMEN EXPIRATION DATE [id number]    CBC with Plt   Result Value Ref Range    WBC Count 7.8 4.0 - 11.0 10e3/uL    RBC Count 4.80 3.80 - 5.20 10e6/uL    Hemoglobin 12.9 11.7 - 15.7 g/dL    Hematocrit 40.2 35.0 - 47.0 %    MCV 84 78 - 100 fL    MCH 26.9 26.5 - 33.0 pg    MCHC 32.1 31.5 - 36.5 g/dL    RDW 13.1 10.0 - 15.0 %    Platelet Count 293 150 - 450 10e3/uL   Hepatitis B Surface Ag   Result Value Ref Range    Hepatitis B Surface Antigen Nonreactive Nonreactive   HIV Ag/Ab Screen Cascade   Result Value Ref Range    HIV Antigen Antibody Combo Nonreactive Nonreactive      Comment:      HIV-1 p24 Ag & HIV-1/HIV-2 Ab Not Detected   Rubella Antibody IgG   Result Value Ref Range    Rubella Stephanie IgG Instrument Value 1.31 <0.90 Index    Rubella Antibody IgG Positive       Comment:      Suggests previous exposure or immunization and probable immunity.   Syphilis Screen Cascade   Result Value Ref Range    Treponema Antibody Total Nonreactive Nonreactive       If you have any questions or concerns, please call the clinic at the number listed above.       Sincerely,      Rissa Trejo MD

## 2023-05-02 NOTE — PROGRESS NOTES
HPI  Rashmi Oliva is a 28 year old  female who presents to clinic for a new OB visit. She is 12 weeks. Her last menstrual period was Patient's last menstrual period was 2023..    Estimated Date of Delivery:  DARLENE 11/10/23    She has not had bleeding since her LMP.   She has not had any abdominal pain or cramping since her LMP.   She has had mild nausea. Weigh loss has not occurred. This was not a planned pregnancy.     OTHER CONCERNS: gestational diabetes with previous two pregnancies.     Have you travelled during the pregnancy?No  Have your sexual partner(s) travelled during the pregnancy?No    VTE RISK:  Already on anticoagulation? none  History of VTE? none  History of Thrombophilia?none    Pre Term Labor Risk Assessment  Is the patient's age <18 or >40? No  Patint's BMI is Body mass index is 33.84 kg/m ..   Does patient have a BMI < 18.5? No  If previous pregnancy, was delivery within previous 6 months? Yes last delivery 2022  Have you ever delivered a baby prior to 37 weeks gestation? No  Did conception for this pregnancy occur via In Vitro Fertilization? No  Are you carrying twins?  U/S not done yet    Summary: Patient is not high risk for  Labor .  Patient is c/o of nausea for the last week( MORNING AND NIGHT SICKNESS)   vomiting 2-3x, Sensitivity to certain smells, nausea worse at night. States last pregnancy Vitamin B6 helped. Discussed adding doxylamine to at night. Youngest child 5 months old. Is surprised by excited about pregnancy, this will be he fourth baby. Of note last two pregnancies complicated by GDM,she started checking her BG daily 3x, since . BG ranges below.  FBG: <95  one hours post: <140 mostly 130-145 range  two hours: <120 122-135 range    No bleeding or spotting since LMP. States last alcoholic beverage , not currently smoking quit few years ago.       OB History    Para Term  AB Living   6 3 3 0 2 3   SAB IAB Ectopic Multiple Live  Births   1 0 0 0 3      # Outcome Date GA Lbr Krishna/2nd Weight Sex Delivery Anes PTL Lv   6 Current            5 Term 11/15/22 39w2d 01:30 / 00:11 3.5 kg (7 lb 11.5 oz) M Vag-Spont None N IKE      Birth Comments: insulin dependant GDM      Name: Neil Yuen      Apgar1: 8  Apgar5: 9   4 Term 19 39w1d 03:05 / 00:04 3.16 kg (6 lb 15.5 oz) F Vag-Spont None N IKE      Birth Comments: nuchal cord delivered through, 2nd degree laceration,EBL 112ml      Complications: Nuchal cord, single gestation      Name: Stacey Yuen      Apgar1: 8  Apgar5: 9   3 Term 18 39w4d / 00:39 3.657 kg (8 lb 1 oz) M Vag-Spont None N IKE      Birth Comments: no complications noted. Length- 20.25 inches and head-33cm. Perineal laceration-2nd degree, labial laceration. EBL 200ml      Name: Abdoulaye Yuen      Apgar1: 8  Apgar5: 9   2 SAB 05/10/17     SAB      1 AB               Obstetric Comments   First pregnancy was a SAB at approximately 12 weeks (May 2017)      Above comment referring to her 2nd preg. First preg was  which ended with an . Ramiro BLACKMAN       Past Medical History:   Diagnosis Date     Chlamydia      Gestational diabetes      Insulin controlled gestational diabetes mellitus (GDM) during pregnancy 2019     Wounds and injuries        Past Surgical History:   Procedure Laterality Date     SURGICAL PATHOLOGY EXAM         Current Outpatient Medications   Medication Sig Dispense Refill     Prenatal Vit-Fe Fumarate-FA (PRENATAL MULTIVITAMIN W/IRON) 27-0.8 MG tablet Take 1 tablet by mouth daily 90 tablet 3     pyridOXINE (VITAMIN B6) 25 MG tablet Take 1 tablet (25 mg) by mouth daily 90 tablet 1     ibuprofen (ADVIL/MOTRIN) 800 MG tablet Take 1 tablet (800 mg) by mouth every 6 hours as needed for other (cramping) (Patient not taking: Reported on 2023) 30 tablet 0          PERSONAL/SOCIAL HISTORY  Social History     Socioeconomic History     Marital status: Single     Spouse name: Bonilla Yuen     Number of  "children: 0     Years of education: 12     Highest education level: None   Occupational History     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   Tobacco Use     Smoking status: Former     Years: 0.10     Types: Cigarettes     Quit date: 2017     Years since quittin.7     Smokeless tobacco: Never     Tobacco comments:     no smolking last 2 weeks   Vaping Use     Vaping status: Former     Passive vaping exposure: Yes   Substance and Sexual Activity     Alcohol use: No     Comment: since pregnancy,aver prepreg special occ,2 beers     Drug use: Not Currently     Types: Marijuana     Comment: 2016     Sexual activity: Yes     Partners: Male     Birth control/protection: None   Social History Narrative    Lives with sister in law and her family. 2017 RLee RN     Have you used any tobacco products, alcohol or any other drugs during this pregnancy before or after your knew you were pregnant? Once prior to positive pregnancy test.    REVIEW OF SYSTEMS  C: NEGATIVE for fever, chills, change in weight  E: NEGATIVE for vision changes or irritation  ENT: NEGATIVE for ear, mouth and throat problems  R: NEGATIVE for significant cough or SOB  B: NEGATIVE for masses, tenderness or discharge  CV: NEGATIVE for chest pain, palpitations or peripheral edema  GI: NEGATIVE for nausea, abdominal pain, heartburn, or change in bowel habits  : NEGATIVE for unusual urinary or vaginal symptoms.   M: NEGATIVE for significant arthralgias or myalgia  N: NEGATIVE for weakness, dizziness or paresthesias  P: NEGATIVE for changes in mood or affect    PHYSICAL EXAM:  /76   Pulse 89   Temp 97.7  F (36.5  C) (Tympanic)   Resp 20   Ht 1.575 m (5' 2\")   Wt 83.9 kg (185 lb)   LMP 2023   SpO2 98%   Breastfeeding No   BMI 33.84 kg/m       GENERAL:  Pleasant pregnant female, alert, well groomed.  SKIN:  Warm and dry, without lesions or rashes  EYES:  PERRLA, EOM intact  MOUTH:  Buccal mucosa pink, moist without lesions.    NECK:  " Thyroid without enlargement and nodules.  No cervical lymphadenopathy.  LUNGS:  Clear to auscultation.  BREAST:  Symmetrical. No masses noted. No skin or nipple changes or axillary nodes.   HEART:  RRR without murmur.  ABDOMEN: Soft without masses , tenderness or organomegaly.  No CVA tenderness. No scars noted.. Fundus palpable at symphysis pubis.  MUSCULOSKELETAL:  Full range of motion.  EXTREMITIES:  No edema. No significant varicosities.     ASSESSMENT/PLAN  Rashmi was seen today for prenatal care.    Diagnoses and all orders for this visit:    Encounter for supervision of other normal pregnancy in first trimester  -     ABO/Rh Type-HML; Future  -     Antibody Screen; Future  -     CBC with Plt; Future  -     Hepatitis B Surface Ag; Future  -     HIV Ag/Ab Screen Cascade; Future  -     Rubella Antibody IgG; Future  -     Syphilis Screen Cascade; Future  -     US OB < 14 weeks, single,  for dating (WHG139); Future  -     Chlamydia trachomatis PCR  URINE; Future  -     Neisseria gonorrhoeae PCR  URINE; Future  -     ABO/Rh Type-HML  -     Antibody Screen  -     CBC with Plt  -     Hepatitis B Surface Ag  -     HIV Ag/Ab Screen Cascade  -     Rubella Antibody IgG  -     Syphilis Screen Cascade  -     Cancel: Chlamydia trachomatis PCR  URINE  -     Cancel: Neisseria gonorrhoeae PCR  URINE    Morning sickness  Reports nausea worse at night, vomits 1-2x. She is able to keep some food and fluids down especially during morning hours. Currently taking B6 once or twice daily. Discussed adding Unisom at night and taking B6 TID.       Counseling given:  -safe medications during pregnancy. Does not desire to start taking prenatal vitamin daily.   -healthy habits including not using tobacco or alcohol, exercising regularly and maintaining healthy diet  -recommended weight gain of  11-20 lbs (pregravid BMI >30)   for BMI of Body mass index is 33.84 kg/m ..  -information given on tips for dealing with nausea, healthy habits,  exposures, safety, prenatal appointment schedule, and when to call the doctor.  -recommendations for breastfeeding.  -Follow up in 4 weeks for return OB visit    PATRICIA Hinton  [facility] PGY1

## 2023-05-03 LAB
HBV SURFACE AG SERPL QL IA: NONREACTIVE
HIV 1+2 AB+HIV1 P24 AG SERPL QL IA: NONREACTIVE
RUBV IGG SERPL QL IA: 1.31 INDEX
RUBV IGG SERPL QL IA: POSITIVE

## 2023-05-11 ENCOUNTER — ANCILLARY PROCEDURE (OUTPATIENT)
Dept: ULTRASOUND IMAGING | Facility: CLINIC | Age: 28
End: 2023-05-11
Attending: FAMILY MEDICINE
Payer: COMMERCIAL

## 2023-05-11 DIAGNOSIS — Z34.81 ENCOUNTER FOR SUPERVISION OF OTHER NORMAL PREGNANCY IN FIRST TRIMESTER: ICD-10-CM

## 2023-05-11 PROCEDURE — 76801 OB US < 14 WKS SINGLE FETUS: CPT | Mod: TC | Performed by: RADIOLOGY

## 2023-05-19 NOTE — PROGRESS NOTES
"  {PROVIDER CHARTING PREFERENCE:244459}    Subjective   Rashmi is a 27 year old{ACCOMPANIED BY STATEMENT (Optional):515201}, presenting for the following health issues:  Prenatal Care and Medication Reconciliation (COMPLETED)      HPI     Struggling to sleep.Sleeps on sides and feels numbness and tingling. Feeling uncomfortable, uses pillows for support. Feels like baby is moving a lot when she is walking. Does not think it is a contraction as she felt that with her son, feels like a tight squeeze in the pelvis. Stops when she stops moving. Feels him kicking and moving. Feeling more cranky, felt nausea yesterday. 3:46 lunch, sugar 118. Around 7pm nauseated, blood sugar 181. 8:07pm went to bed without eating. No vomiting. This morning 08:58 blood sugar 80s. Taking 17U lantus at night. Lateral leg numbness.     Review of Systems   Constitutional, HEENT, cardiovascular, pulmonary, gi and gu systems are negative, except as otherwise noted.      Objective    /71   Pulse 73   Temp 98.3  F (36.8  C)   Resp 20   Ht 1.549 m (5' 1\")   Wt 85.7 kg (189 lb)   LMP 01/28/2022   SpO2 98%   BMI 35.71 kg/m    Body mass index is 35.71 kg/m .  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 female presented for scheduled surgery by Dr. Borrego.  She anticipates right revision reverse total shoulder arthroplasty.  Her shoulder has been painful for many years.  She had her first shoulder surgery rotator cuff repair in  and a reverse total shoulder arthroplasty in 2018.  She had a fall last year that worsened her shoulder pain.  She denies history of DVT or PE.  She denies cardiac history.     When seen in preop, patient is resting comfortably.  She denies nausea, shortness of breath or chest pain.    She subsequently underwent right revision reverse total shoulder arthroplasty under general anesthesia.  She tolerated surgery well and was admitted for further medical management.    After admit:  Patient was provided pain medications as needed for pain control, along with interscalene nerve block infusion of Ropivacaine    Adjustments were made to pain medications to optimize postop pain management.   Risks and benefits of opiate medications discussed with patient. Pain medicine prescribed per Dr. Borrego    The patient was seen by OT and was taught exercises for her arm.  The patient used an IS for atelectasis prophylaxis and mechanicals for DVT prophylaxis.    Home medications were resumed as appropriate, and labs were monitored and remained fairly stable.     With the progress she has made, she is ready for DC home today.      The patient will have an Infupump ( instructed on it during this admit)  Discussed with patient regarding plan and she shows understanding and  "agreement.            Procedures Performed  Procedure(s):  REVISION REVERSE TOTAL SHOULDER ARTHROPLASTY - RIGHT       Pertinent Test Results:    I reviewed the patient's new clinical results.   Results from last 7 days   Lab Units 23  0426   WBC 10*3/mm3 10.65   HEMOGLOBIN g/dL 11.2*   HEMATOCRIT % 33.8*   PLATELETS 10*3/mm3 288     Results from last 7 days   Lab Units 23  0426   SODIUM mmol/L 131*   POTASSIUM mmol/L 4.2   CHLORIDE mmol/L 99   CO2 mmol/L 24.0   BUN mg/dL 9   CREATININE mg/dL 0.69   CALCIUM mg/dL 9.0   GLUCOSE mg/dL 108*     I reviewed the patient's new imaging including images and reports.      Physical therapy  Plan of Care Reviewed With: patient  Outcome Evaluation: Patient presents s/p R shoulder revision with mild balance deficits. She ambulated 430' CGA in [place] initially unsupported with mild unsteadiness showing improved steadiness with addition of SPC. IPPT is indicated to address current deficits, however she is cleared from a PT standpoint to D/C home with assist and use of SPC when medically appropriate.    Discharge Assessment:       Visit Vitals  BP 95/59 (BP Location: Left arm, Patient Position: Sitting)   Pulse 82   Temp 97.9 °F (36.6 °C) (Oral)   Resp 18   Ht 177.8 cm (70\")   Wt 68.5 kg (151 lb)   SpO2 95%   BMI 21.67 kg/m²     Temp (24hrs), Av.5 °F (36.4 °C), Min:97 °F (36.1 °C), Max:98 °F (36.7 °C)      General Appearance:    Alert, cooperative, in no acute distress   Lungs:     Clear to auscultation,respirations regular, even and   unlabored    Heart:    Regular rhythm and normal rate, normal S1 and S2    Abdomen:     Normal bowel sounds, no masses, no organomegaly, soft        non-tender, non-distended, no guarding, no rebound                 tenderness   Extremities:   RUE in a sling, CDI  dressing over right shoulder incision . Interscalene nerve block cath present.  Distal pulses, cap refill,  intact. Movement and sensation intact     Pulses:   Pulses palpable " and equal bilaterally   Skin:   No bleeding, bruising or rash        Discharge Disposition: Home          Discharge Medications      New Medications      Instructions Start Date   docusate sodium 100 MG capsule  Commonly known as: COLACE   100 mg, Oral, 2 Times Daily      HYDROcodone-acetaminophen 5-325 MG per tablet  Commonly known as: NORCO   1 tablet, Oral, Every 4 Hours PRN      ondansetron 4 MG tablet  Commonly known as: Zofran   4 mg, Oral, Every 8 Hours PRN         Continue These Medications      Instructions Start Date   cetirizine 10 MG tablet  Commonly known as: zyrTEC   10 mg, Oral, Daily PRN      DUAVEE PO   1 tablet, Oral, Daily, NOT CURRENTLY USING             Discharge Diet: Resume home diet    Activity at Discharge:   NWB RUE, ROM elbow, wrist, and hand per Dr. Borrego instructions.    Follow-up Appointments:  Follow-up with Dr. Borrego per his recommendation    Discharge took greater than 30 minutes     Electronically signed by PIERRE Davis, 05/19/23, 12:15 PM EDT.

## 2023-05-24 ENCOUNTER — MEDICAL CORRESPONDENCE (OUTPATIENT)
Dept: HEALTH INFORMATION MANAGEMENT | Facility: CLINIC | Age: 28
End: 2023-05-24
Payer: COMMERCIAL

## 2023-06-02 ENCOUNTER — OFFICE VISIT (OUTPATIENT)
Dept: FAMILY MEDICINE | Facility: CLINIC | Age: 28
End: 2023-06-02
Payer: COMMERCIAL

## 2023-06-02 VITALS
HEIGHT: 61 IN | DIASTOLIC BLOOD PRESSURE: 73 MMHG | SYSTOLIC BLOOD PRESSURE: 110 MMHG | RESPIRATION RATE: 24 BRPM | HEART RATE: 66 BPM | OXYGEN SATURATION: 98 % | WEIGHT: 183 LBS | TEMPERATURE: 97.6 F | BODY MASS INDEX: 34.55 KG/M2

## 2023-06-02 DIAGNOSIS — R11.2 NAUSEA AND VOMITING, UNSPECIFIED VOMITING TYPE: ICD-10-CM

## 2023-06-02 DIAGNOSIS — R82.71 ASYMPTOMATIC BACTERIURIA DURING PREGNANCY: ICD-10-CM

## 2023-06-02 DIAGNOSIS — Z34.92 ENCOUNTER FOR PREGNANCY RELATED EXAMINATION IN SECOND TRIMESTER: Primary | ICD-10-CM

## 2023-06-02 DIAGNOSIS — O99.891 ASYMPTOMATIC BACTERIURIA DURING PREGNANCY: ICD-10-CM

## 2023-06-02 LAB
ALBUMIN UR-MCNC: NEGATIVE MG/DL
APPEARANCE UR: CLEAR
BACTERIA #/AREA URNS HPF: ABNORMAL /HPF
BILIRUB UR QL STRIP: NEGATIVE
COLOR UR AUTO: YELLOW
GLUCOSE UR STRIP-MCNC: NEGATIVE MG/DL
HGB UR QL STRIP: ABNORMAL
KETONES UR STRIP-MCNC: 40 MG/DL
LEUKOCYTE ESTERASE UR QL STRIP: ABNORMAL
NITRATE UR QL: NEGATIVE
PH UR STRIP: 6.5 [PH] (ref 5–7)
RBC #/AREA URNS AUTO: ABNORMAL /HPF
SP GR UR STRIP: 1.01 (ref 1–1.03)
SQUAMOUS #/AREA URNS AUTO: ABNORMAL /LPF
TRANS CELLS #/AREA URNS HPF: ABNORMAL /HPF
UROBILINOGEN UR STRIP-ACNC: 0.2 E.U./DL
WBC #/AREA URNS AUTO: ABNORMAL /HPF
WBC CLUMPS #/AREA URNS HPF: ABNORMAL /HPF

## 2023-06-02 PROCEDURE — 87491 CHLMYD TRACH DNA AMP PROBE: CPT

## 2023-06-02 PROCEDURE — 81001 URINALYSIS AUTO W/SCOPE: CPT

## 2023-06-02 PROCEDURE — 99207 PR PRENATAL VISIT: CPT | Mod: GC

## 2023-06-02 PROCEDURE — 87591 N.GONORRHOEAE DNA AMP PROB: CPT

## 2023-06-02 PROCEDURE — 87086 URINE CULTURE/COLONY COUNT: CPT

## 2023-06-02 RX ORDER — PYRIDOXINE HCL (VITAMIN B6) 25 MG
25 TABLET ORAL DAILY
Qty: 90 TABLET | Refills: 1 | Status: SHIPPED | OUTPATIENT
Start: 2023-06-02 | End: 2023-09-11
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
## 2023-06-03 ENCOUNTER — HEALTH MAINTENANCE LETTER (OUTPATIENT)
Age: 28
End: 2023-06-03

## 2023-06-03 LAB
C TRACH DNA SPEC QL PROBE+SIG AMP: NEGATIVE
N GONORRHOEA DNA SPEC QL NAA+PROBE: NEGATIVE

## 2023-06-04 LAB — BACTERIA UR CULT: NORMAL

## 2023-06-06 RX ORDER — NITROFURANTOIN 25; 75 MG/1; MG/1
100 CAPSULE ORAL 2 TIMES DAILY
Qty: 14 CAPSULE | Refills: 0 | Status: SHIPPED | OUTPATIENT
Start: 2023-06-06 | End: 2023-06-13

## 2023-06-16 ENCOUNTER — OFFICE VISIT (OUTPATIENT)
Dept: FAMILY MEDICINE | Facility: CLINIC | Age: 28
End: 2023-06-16
Payer: COMMERCIAL

## 2023-06-16 VITALS
TEMPERATURE: 98.7 F | SYSTOLIC BLOOD PRESSURE: 114 MMHG | HEART RATE: 76 BPM | WEIGHT: 185 LBS | BODY MASS INDEX: 34.04 KG/M2 | HEIGHT: 62 IN | DIASTOLIC BLOOD PRESSURE: 75 MMHG | RESPIRATION RATE: 18 BRPM | OXYGEN SATURATION: 98 %

## 2023-06-16 DIAGNOSIS — O24.410 DIET CONTROLLED GESTATIONAL DIABETES MELLITUS (GDM) IN SECOND TRIMESTER: ICD-10-CM

## 2023-06-16 DIAGNOSIS — Z34.82 ENCOUNTER FOR SUPERVISION OF OTHER NORMAL PREGNANCY IN SECOND TRIMESTER: Primary | ICD-10-CM

## 2023-06-16 LAB — GLUCOSE 1H P 50 G GLC PO SERPL-MCNC: 209 MG/DL (ref 70–129)

## 2023-06-16 PROCEDURE — 99207 PR PRENATAL VISIT: CPT | Mod: GC

## 2023-06-16 PROCEDURE — 36415 COLL VENOUS BLD VENIPUNCTURE: CPT

## 2023-06-16 PROCEDURE — 82950 GLUCOSE TEST: CPT

## 2023-06-16 NOTE — PROGRESS NOTES
HPI:  Rashmi Oliva is a 28 year old  female who presents to clinic for a follow up OB visit. She is currently 19w0d with an estimated date of delivery of Nov 10, 2023 via dating ultrasound.   She has felt baby move.     New concerns today: gestational diabetes testing    ROS:  No - Headache  No - Changes in vision  No - Chest Pain  No - Shortness of Breath  YES - Nausea, just at night. Been taking vitamin B6 and sleeping tabs (unisom)   No - Vomiting  No - Abdominal pain   No - Contractions  No - Dysuria   No - Vaginal Discharge    No - Vaginal bleeding   No - Loss of Fluid   No - Extremity swelling     OB History    Para Term  AB Living   6 3 3 0 2 3   SAB IAB Ectopic Multiple Live Births   1 0 0 0 3      # Outcome Date GA Lbr Krishna/2nd Weight Sex Delivery Anes PTL Lv   6 Current            5 Term 11/15/22 39w2d 01:30 / 00:11 3.5 kg (7 lb 11.5 oz) M Vag-Spont None N IKE      Birth Comments: insulin dependant GDM      Name: Neil Yuen      Apgar1: 8  Apgar5: 9   4 Term 19 39w1d 03:05 / 00:04 3.16 kg (6 lb 15.5 oz) F Vag-Spont None N IKE      Birth Comments: nuchal cord delivered through, 2nd degree laceration,EBL 112ml      Complications: Nuchal cord, single gestation      Name: Stacey Yuen      Apgar1: 8  Apgar5: 9   3 Term 18 39w4d / 00:39 3.657 kg (8 lb 1 oz) M Vag-Spont None N IKE      Birth Comments: no complications noted. Length- 20.25 inches and head-33cm. Perineal laceration-2nd degree, labial laceration. EBL 200ml      Name: Abdoulaye Yuen      Apgar1: 8  Apgar5: 9   2 SAB 05/10/17     SAB      1 AB               Obstetric Comments   First pregnancy was a SAB at approximately 12 weeks (May 2017)      Above comment referring to her 2nd preg. First preg was  which ended with an . Ramiro RN     Physical exam:  /75 (BP Location: Right arm, Patient Position: Sitting, Cuff Size: Adult Regular)   Pulse 76   Temp 98.7  F (37.1  C) (Tympanic)   Resp 18   Ht  "1.575 m (5' 2\")   Wt 83.9 kg (185 lb)   LMP 02/03/2023   SpO2 98%   BMI 33.84 kg/m      General: alert, female in no acute distress  Abdomen: gravid uterus appropriate for gestation age above pubic symphysis 19 cm, non-tender, FHTs 147  Extremities: no edema    Assessment/Plan:  Rashmi was seen today for prenatal care.    Diagnoses and all orders for this visit:    Encounter for supervision of other normal pregnancy in second trimester  - Fetal survey ultrasound scheduled 6/22.  - Patient is not planning to complete childbirth education classes.   - If boy, would not like to have him circumcised.  - Discussed pre-term labor signs and symptoms and when to call/come in.   - Continued encouragement of breastfeeding.    Diet controlled gestational diabetes mellitus (GDM) in second trimester  Glucose tolerance test ordered today due to previous GDM in past pregnancies. Resulted at 209. Patient declined the three hour test, and said she would instead like to procedure with the diagnosis considering her past. She has all of the equipment to check her blood sugars; will start checking fasting and one hour post meals, recording these values. Will plan to have her follow-up with Dr. Luong in 1-2 weeks to discuss results.     Wt Readings from Last 4 Encounters:   06/16/23 83.9 kg (185 lb)   06/02/23 83 kg (183 lb)   05/02/23 83.9 kg (185 lb)   04/21/23 83.9 kg (185 lb)       Weight gain adequate: 0 to date, out of recommended total of 11-20 lbs (pregravid BMI >30). 185 prior to pregnancy. No weight gain can be expected at this point, then 0.5 lbs a week starting in the second trimester.     Return to clinic in 2 weeks.    Venkata Urban  MS4, [employer]    I was present with the medical student who participated in the service and in the documentation of this note. I have verified the history and personally performed the physical exam and medical decision making, and have verified the content of the note, which accurately reflects " my assessment of the patient and the plan of care.     Emmie Vizcarra MD  [facility], PGY-2

## 2023-06-22 ENCOUNTER — ANCILLARY PROCEDURE (OUTPATIENT)
Dept: ULTRASOUND IMAGING | Facility: CLINIC | Age: 28
End: 2023-06-22
Attending: FAMILY MEDICINE
Payer: COMMERCIAL

## 2023-06-22 DIAGNOSIS — Z34.92 ENCOUNTER FOR PREGNANCY RELATED EXAMINATION IN SECOND TRIMESTER: ICD-10-CM

## 2023-06-22 PROCEDURE — 76805 OB US >/= 14 WKS SNGL FETUS: CPT | Mod: TC | Performed by: RADIOLOGY

## 2023-08-22 ENCOUNTER — TELEPHONE (OUTPATIENT)
Dept: FAMILY MEDICINE | Facility: CLINIC | Age: 28
End: 2023-08-22
Payer: COMMERCIAL

## 2023-08-22 DIAGNOSIS — K59.00 CONSTIPATION, UNSPECIFIED CONSTIPATION TYPE: Primary | ICD-10-CM

## 2023-08-22 NOTE — TELEPHONE ENCOUNTER
[facility] phone call message- general phone call:    Reason for call: patient is calling because patient is having a hard time using restroom and heartburn. Patient is asking for stool softener and heartburn. Patient stated that she already has appointment schedule on 9/1/23, but is having a hard time right now and needing something for now until her appointment. Please call and advise, if send please have color team call and let patient know. Thank you    Return call needed: Yes    OK to leave a message on voice mail? Yes    Primary language: English      needed? No    Call taken on August 22, 2023 at 3:02 PM by Ashley Courtney

## 2023-08-24 RX ORDER — POLYETHYLENE GLYCOL 3350 17 G/17G
1 POWDER, FOR SOLUTION ORAL DAILY
Qty: 527 G | Refills: 3 | Status: SHIPPED | OUTPATIENT
Start: 2023-08-24 | End: 2023-09-11

## 2023-08-25 NOTE — TELEPHONE ENCOUNTER
Called patient to discuss constipation and reflux.     She has been experiencing reflux throughout the day. She is taking Tums and feels like it is helping.  Discussed the limit of how much she can take in a day.    She has also been experiencing constipation. She notes that she tries to poop twice daily but usually just ends up sitting on the toilet for 15-20 minutes straining to poop. Bowel movements are hard and small if they do occur. She is not currently taking anything for this.     Plan   Reflux: currently taking Tums which are helpful. Will continue to monitor at this time. Discussed that if her symptoms worsen, or if she is consistently having to take multiple Tums a day, could discuss starting Pepcid at her follow-up appointment with Dr. Luong on 9/1.     Constipation: Patient is currently regularly straining for long periods of time with no regular bowel movements. Concerned that she is at risk for hemorrhoids. She is not currently taking any medications for this. Discussed eating a high fiber diet and the initiation of daily Miralax. Will prescribe that today. Patient will take regularly, and follow-up on her constipation with Dr. Luong at her next appointment.     Emmie Vizcarra MD  [facility], PGY-3

## 2023-09-01 ENCOUNTER — OFFICE VISIT (OUTPATIENT)
Dept: FAMILY MEDICINE | Facility: CLINIC | Age: 28
End: 2023-09-01
Payer: COMMERCIAL

## 2023-09-01 VITALS
RESPIRATION RATE: 20 BRPM | BODY MASS INDEX: 35.7 KG/M2 | OXYGEN SATURATION: 98 % | HEIGHT: 62 IN | SYSTOLIC BLOOD PRESSURE: 109 MMHG | HEART RATE: 84 BPM | WEIGHT: 194 LBS | TEMPERATURE: 98.1 F | DIASTOLIC BLOOD PRESSURE: 75 MMHG

## 2023-09-01 DIAGNOSIS — Z34.82 ENCOUNTER FOR SUPERVISION OF OTHER NORMAL PREGNANCY IN SECOND TRIMESTER: Primary | ICD-10-CM

## 2023-09-01 DIAGNOSIS — R12 HEARTBURN: ICD-10-CM

## 2023-09-01 DIAGNOSIS — O24.419 GESTATIONAL DIABETES MELLITUS (GDM) IN SECOND TRIMESTER, GESTATIONAL DIABETES METHOD OF CONTROL UNSPECIFIED: ICD-10-CM

## 2023-09-01 PROCEDURE — 99213 OFFICE O/P EST LOW 20 MIN: CPT | Mod: GC

## 2023-09-01 PROCEDURE — 99207 PR PRENATAL VISIT: CPT | Mod: GC

## 2023-09-01 RX ORDER — FAMOTIDINE 20 MG/1
20 TABLET, FILM COATED ORAL 2 TIMES DAILY
Qty: 60 TABLET | Refills: 3 | Status: SHIPPED | OUTPATIENT
Start: 2023-09-01 | End: 2023-12-30
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
## 2023-09-11 ENCOUNTER — APPOINTMENT (OUTPATIENT)
Dept: ULTRASOUND IMAGING | Facility: HOSPITAL | Age: 28
End: 2023-09-11
Attending: EMERGENCY MEDICINE
Payer: COMMERCIAL

## 2023-09-11 ENCOUNTER — HOSPITAL ENCOUNTER (INPATIENT)
Facility: HOSPITAL | Age: 28
LOS: 3 days | Discharge: HOME | End: 2023-09-14
Attending: EMERGENCY MEDICINE | Admitting: STUDENT IN AN ORGANIZED HEALTH CARE EDUCATION/TRAINING PROGRAM
Payer: COMMERCIAL

## 2023-09-11 DIAGNOSIS — O24.419 GESTATIONAL DIABETES MELLITUS (GDM) IN SECOND TRIMESTER, GESTATIONAL DIABETES METHOD OF CONTROL UNSPECIFIED: ICD-10-CM

## 2023-09-11 DIAGNOSIS — L03.113 CELLULITIS OF RIGHT UPPER EXTREMITY: Primary | ICD-10-CM

## 2023-09-11 DIAGNOSIS — L03.90 CELLULITIS: ICD-10-CM

## 2023-09-11 DIAGNOSIS — L29.9 ITCHING: ICD-10-CM

## 2023-09-11 LAB
ANION GAP SERPL CALCULATED.3IONS-SCNC: 10 MMOL/L (ref 7–15)
BUN SERPL-MCNC: 4.7 MG/DL (ref 6–20)
CALCIUM SERPL-MCNC: 8.2 MG/DL (ref 8.6–10)
CHLORIDE SERPL-SCNC: 106 MMOL/L (ref 98–107)
CREAT SERPL-MCNC: 0.49 MG/DL (ref 0.51–0.95)
DEPRECATED HCO3 PLAS-SCNC: 20 MMOL/L (ref 22–29)
EGFRCR SERPLBLD CKD-EPI 2021: >90 ML/MIN/1.73M2
ERYTHROCYTE [DISTWIDTH] IN BLOOD BY AUTOMATED COUNT: 13.3 % (ref 10–15)
GLUCOSE SERPL-MCNC: 99 MG/DL (ref 70–99)
HCT VFR BLD AUTO: 38.9 % (ref 35–47)
HGB BLD-MCNC: 12.5 G/DL (ref 11.7–15.7)
LACTATE SERPL-SCNC: 1 MMOL/L (ref 0.7–2)
MCH RBC QN AUTO: 26.5 PG (ref 26.5–33)
MCHC RBC AUTO-ENTMCNC: 32.1 G/DL (ref 31.5–36.5)
MCV RBC AUTO: 83 FL (ref 78–100)
PLATELET # BLD AUTO: 306 10E3/UL (ref 150–450)
POTASSIUM SERPL-SCNC: 3.5 MMOL/L (ref 3.4–5.3)
RBC # BLD AUTO: 4.71 10E6/UL (ref 3.8–5.2)
SODIUM SERPL-SCNC: 136 MMOL/L (ref 136–145)
WBC # BLD AUTO: 11.3 10E3/UL (ref 4–11)

## 2023-09-11 PROCEDURE — 250N000011 HC RX IP 250 OP 636: Mod: JZ | Performed by: EMERGENCY MEDICINE

## 2023-09-11 PROCEDURE — 93971 EXTREMITY STUDY: CPT | Mod: RT

## 2023-09-11 PROCEDURE — 85014 HEMATOCRIT: CPT | Performed by: EMERGENCY MEDICINE

## 2023-09-11 PROCEDURE — 250N000012 HC RX MED GY IP 250 OP 636 PS 637

## 2023-09-11 PROCEDURE — 87070 CULTURE OTHR SPECIMN AEROBIC: CPT

## 2023-09-11 PROCEDURE — 83605 ASSAY OF LACTIC ACID: CPT | Performed by: EMERGENCY MEDICINE

## 2023-09-11 PROCEDURE — 250N000013 HC RX MED GY IP 250 OP 250 PS 637: Performed by: EMERGENCY MEDICINE

## 2023-09-11 PROCEDURE — 99285 EMERGENCY DEPT VISIT HI MDM: CPT | Mod: 25

## 2023-09-11 PROCEDURE — 250N000013 HC RX MED GY IP 250 OP 250 PS 637

## 2023-09-11 PROCEDURE — 96365 THER/PROPH/DIAG IV INF INIT: CPT | Mod: 59

## 2023-09-11 PROCEDURE — 250N000011 HC RX IP 250 OP 636: Mod: JZ

## 2023-09-11 PROCEDURE — 36415 COLL VENOUS BLD VENIPUNCTURE: CPT | Performed by: EMERGENCY MEDICINE

## 2023-09-11 PROCEDURE — 80048 BASIC METABOLIC PNL TOTAL CA: CPT | Performed by: EMERGENCY MEDICINE

## 2023-09-11 PROCEDURE — 87040 BLOOD CULTURE FOR BACTERIA: CPT | Performed by: EMERGENCY MEDICINE

## 2023-09-11 PROCEDURE — 120N000001 HC R&B MED SURG/OB

## 2023-09-11 RX ORDER — DEXTROSE MONOHYDRATE 25 G/50ML
25-50 INJECTION, SOLUTION INTRAVENOUS
Status: DISCONTINUED | OUTPATIENT
Start: 2023-09-11 | End: 2023-09-14 | Stop reason: HOSPADM

## 2023-09-11 RX ORDER — POLYETHYLENE GLYCOL 3350 17 G/17G
1 POWDER, FOR SOLUTION ORAL DAILY PRN
COMMUNITY

## 2023-09-11 RX ORDER — CEFAZOLIN SODIUM 2 G/100ML
2 INJECTION, SOLUTION INTRAVENOUS EVERY 8 HOURS
Status: DISCONTINUED | OUTPATIENT
Start: 2023-09-11 | End: 2023-09-11

## 2023-09-11 RX ORDER — CEFTRIAXONE 2 G/1
2 INJECTION, POWDER, FOR SOLUTION INTRAMUSCULAR; INTRAVENOUS ONCE
Status: COMPLETED | OUTPATIENT
Start: 2023-09-11 | End: 2023-09-11

## 2023-09-11 RX ORDER — ACETAMINOPHEN 325 MG/1
975 TABLET ORAL ONCE
Status: COMPLETED | OUTPATIENT
Start: 2023-09-11 | End: 2023-09-11

## 2023-09-11 RX ORDER — FAMOTIDINE 20 MG/1
20 TABLET, FILM COATED ORAL 2 TIMES DAILY
Status: DISCONTINUED | OUTPATIENT
Start: 2023-09-11 | End: 2023-09-14 | Stop reason: HOSPADM

## 2023-09-11 RX ORDER — CEFAZOLIN SODIUM 2 G/100ML
2 INJECTION, SOLUTION INTRAVENOUS EVERY 8 HOURS
Status: DISCONTINUED | OUTPATIENT
Start: 2023-09-12 | End: 2023-09-11

## 2023-09-11 RX ORDER — NICOTINE POLACRILEX 4 MG
15-30 LOZENGE BUCCAL
Status: DISCONTINUED | OUTPATIENT
Start: 2023-09-11 | End: 2023-09-14 | Stop reason: HOSPADM

## 2023-09-11 RX ORDER — PYRIDOXINE HCL (VITAMIN B6) 25 MG
25 TABLET ORAL DAILY PRN
COMMUNITY

## 2023-09-11 RX ORDER — CEFAZOLIN SODIUM 2 G/100ML
2 INJECTION, SOLUTION INTRAVENOUS EVERY 8 HOURS
Status: DISCONTINUED | OUTPATIENT
Start: 2023-09-11 | End: 2023-09-14 | Stop reason: HOSPADM

## 2023-09-11 RX ORDER — LIDOCAINE 40 MG/G
CREAM TOPICAL
Status: DISCONTINUED | OUTPATIENT
Start: 2023-09-11 | End: 2023-09-14 | Stop reason: HOSPADM

## 2023-09-11 RX ADMIN — ACETAMINOPHEN 975 MG: 325 TABLET ORAL at 16:47

## 2023-09-11 RX ADMIN — CEFAZOLIN SODIUM 2 G: 2 INJECTION, SOLUTION INTRAVENOUS at 22:43

## 2023-09-11 RX ADMIN — FAMOTIDINE 20 MG: 20 TABLET ORAL at 22:02

## 2023-09-11 RX ADMIN — CEFTRIAXONE SODIUM 2 G: 2 INJECTION, POWDER, FOR SOLUTION INTRAMUSCULAR; INTRAVENOUS at 18:28

## 2023-09-11 RX ADMIN — INSULIN GLARGINE 4 UNITS: 100 INJECTION, SOLUTION SUBCUTANEOUS at 22:02

## 2023-09-11 ASSESSMENT — ACTIVITIES OF DAILY LIVING (ADL)
DIFFICULTY_COMMUNICATING: NO
WEAR_GLASSES_OR_BLIND: NO
ADLS_ACUITY_SCORE: 20
CHANGE_IN_FUNCTIONAL_STATUS_SINCE_ONSET_OF_CURRENT_ILLNESS/INJURY: NO
ADLS_ACUITY_SCORE: 35
DOING_ERRANDS_INDEPENDENTLY_DIFFICULTY: YES
ADLS_ACUITY_SCORE: 35
WALKING_OR_CLIMBING_STAIRS_DIFFICULTY: NO
CONCENTRATING,_REMEMBERING_OR_MAKING_DECISIONS_DIFFICULTY: NO
ADLS_ACUITY_SCORE: 35
FALL_HISTORY_WITHIN_LAST_SIX_MONTHS: NO
TOILETING_ISSUES: NO
DRESSING/BATHING_DIFFICULTY: NO
HEARING_DIFFICULTY_OR_DEAF: NO
DIFFICULTY_EATING/SWALLOWING: NO

## 2023-09-11 NOTE — ED PROVIDER NOTES
EMERGENCY DEPARTMENT ENCOUNTER      NAME: Rashmi Oliva  AGE: 28 year old female  YOB: 1995  MRN: [id number]  EVALUATION DATE & TIME: 9/11/2023  4:23 PM    PCP: Emmie Vizcarra    ED PROVIDER: Mihaela Chavis PA-C      Chief Complaint   Patient presents with    right arm swelling         FINAL IMPRESSION:  1. Cellulitis      MEDICAL DECISION MAKING:    Pertinent Labs & Imaging studies reviewed. (See chart for details)  28 year old female presents to the Emergency Department for evaluation of arm pain and swelling.  The patient was at an arcade in [place] when she thinks she may have gotten 2 bites from an insect.  There are 2 small areas of swelling on her right arm which she did not think much of until this morning when she woke up and her arm was significantly swollen and painful she also had some numbness tingling in her hand.  She is 31 weeks pregnant and was concerned about possible infection and did not know what medications she should be taking for her symptoms and presents to the emergency department.  On my evaluation, the patient was vitally stable and overall well-appearing.  Examination with significant swelling, erythema and warmth from the base of the right thumb extending to the forearm with streaking to the upper arm. 2+ radial pulses. Slightly decreased range of motion of the right wrist due to pain, able to move the wrist and  normally. No axillary lymphadenopathy.  Differential diagnosis included cellulitis, abscess, DVT, lymphangitis, sepsis.    Patient's vitals were stable without any tachycardia, fever, hypotension and lactate was normal at 1.0 and I do not feel sepsis is likely cause of her symptoms.  There was no palpable abscess and ultrasound did not show any abscess nor DVT in the right upper extremity.  Ultrasound was concerning for cellulitis.  CBC with mildly elevated white blood cell count of 11.3.  Blood cultures were sent and are pending.  BMP without any  significant derangement.  At this time with the significant amount of swelling in her right upper extremity, the short onset of symptoms, signs of cellulitis on examination and the patient being 31 weeks pregnant with limited antibiotic choices I do feel that she requires admission to the hospital for IV ceftriaxone and continued monitoring.  This was discussed with the patient and she was agreement understanding.  I spoke with the hospitalist Dr. Harmon who accepted the patient for admission.    Medical Decision Making    History:  Supplemental history from: Documented in chart, if applicable  External Record(s) reviewed: Documented in chart, if applicable.    Work Up:  Chart documentation includes differential considered and any EKGs or imaging independently interpreted by provider, where specified.  In additional to work up documented, I considered the following work up: Documented in chart, if applicable.    External consultation:  Discussion of management with another provider: Documented in chart, if applicable    Complicating factors:  Care impacted by chronic illness: Mental Health  Care affected by social determinants of health: N/A    Disposition considerations: Admit.         ED COURSE:  4:25 PM I met with the patient, obtained history, performed an initial exam, and discussed options and plan for diagnostics and treatment here in the ED.  4:41 PM I staffed patient with Dr. Soto.  7:03 PM I spoke to Dr. Harmon, who accepted the patient for admission.    At the conclusion of the encounter I discussed the results of all of the tests and the disposition. The questions were answered. The patient or family acknowledged understanding and was agreeable with the care plan.     MEDICATIONS GIVEN IN THE EMERGENCY:  Medications   acetaminophen (TYLENOL) tablet 975 mg (975 mg Oral $Given 9/11/23 9003)   cefTRIAXone (ROCEPHIN) 2 g vial to attach to  ml bag for ADULTS or NS 50 ml bag for PEDS (0 g Intravenous  "Stopped 9/11/23 1907)       NEW PRESCRIPTIONS STARTED AT TODAY'S ER VISIT  New Prescriptions    No medications on file            =================================================================    HPI:    Patient information was obtained from: Patient    Use of Interpretor: N/A         Rashmi Oliva is a 28 year old female with a pertinent history of PTSD and GDM who presents to this ED via walk-in for evaluation of right arm swelling.    Patient is 31 weeks pregnant and reports being on a family trip yesterday when she noticed 2 red bumps on her right arm, one on the forearm and one above the elbow, that started swelling later in the day. She says the redness and swelling has gotten worse since then and feels \"hot\" and numb/tingling after her arm became very swollen. Patient notes some pain when moving wrist and thumb. She has tried using ice packs with mild, short term relief. Otherwise, patient denies any fever, chills, taking any pain medications, tingling, chest pain, trouble breathing, abdominal pain, or knowing being bitten by anything. No other complaints at this time.      REVIEW OF SYSTEMS:  Negative unless otherwise stated in the above HPI.       PAST MEDICAL HISTORY:  Past Medical History:   Diagnosis Date    Chlamydia     Gestational diabetes     Insulin controlled gestational diabetes mellitus (GDM) during pregnancy 03/2019    Wounds and injuries        PAST SURGICAL HISTORY:  Past Surgical History:   Procedure Laterality Date    SURGICAL PATHOLOGY EXAM  2015           CURRENT MEDICATIONS:    No current facility-administered medications for this encounter.    Current Outpatient Medications:     famotidine (PEPCID) 20 MG tablet, Take 1 tablet (20 mg) by mouth 2 times daily for 120 days, Disp: 60 tablet, Rfl: 3    insulin glargine (LANTUS PEN) 100 UNIT/ML pen, Inject 4 Units Subcutaneous At Bedtime for 30 days, Disp: 1.2 mL, Rfl: 0    polyethylene glycol (MIRALAX) 17 g packet, Take 1 packet by mouth daily " as needed for constipation, Disp: , Rfl:     Prenatal Vit-Fe Fumarate-FA (PRENATAL MULTIVITAMIN W/IRON) 27-0.8 MG tablet, Take 1 tablet by mouth daily, Disp: 90 tablet, Rfl: 3    pyridOXINE (VITAMIN B6) 25 MG tablet, Take 25 mg by mouth daily as needed (nausea), Disp: , Rfl:     blood glucose (NO BRAND SPECIFIED) test strip, Use to test blood sugar 3 times daily or as directed., Disp: 100 strip, Rfl: 3    blood glucose monitoring (ACCU-CHEK MULTICLIX) lancets, Use to test blood sugar 3 times daily or as directed., Disp: 1 each, Rfl: 1    insulin pen needle (32G X 4 MM) 32G X 4 MM miscellaneous, Use 1 pen needles daily or as directed., Disp: 50 each, Rfl: 0      ALLERGIES:  Allergies   Allergen Reactions    Banana Swelling     Swollen throat    Sulfa Antibiotics Hives       FAMILY HISTORY:  Family History   Problem Relation Age of Onset    Depression Mother     Diabetes Father         bilateral legs and 2-3 fingers on bilateral hands were amputated    Hypertension Father     Depression Father     Kidney Disease Father         dialysis secondary to kidney failure       SOCIAL HISTORY:   Social History     Socioeconomic History    Marital status: Single     Spouse name: Bonilla Yuen    Number of children: 0    Years of education: 12   Occupational History    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 2017 Ramiro BLACKMAN  "      VITALS:  Patient Vitals for the past 24 hrs:   BP Temp Temp src Pulse Resp SpO2 Height Weight   09/11/23 1903 111/70 -- -- 74 -- 100 % -- --   09/11/23 1830 -- -- -- 82 -- 100 % -- --   09/11/23 1827 -- -- -- 72 -- 99 % -- --   09/11/23 1745 -- -- -- 74 -- 99 % -- --   09/11/23 1700 106/65 -- -- 83 -- 98 % -- --   09/11/23 1644 111/70 -- -- -- -- -- -- --   09/11/23 1618 124/80 97.8  F (36.6  C) Oral 94 12 97 % 1.575 m (5' 2\") 88.9 kg (196 lb)       PHYSICAL EXAM    Constitutional: Well developed, Well nourished, NAD  HENT: Normocephalic, Atraumatic, Bilateral external ears normal, Oropharynx normal, mucous membranes moist, Nose normal.   Neck: Normal range of motion, No tenderness, Supple, No stridor.  Eyes: Eyes track normally throughout exam, Conjunctiva normal, No discharge.   Respiratory: Normal breath sounds, No respiratory distress, No wheezing, Speaks full sentences easily. No cough.  Cardiovascular: Normal heart rate, Regular rhythm, No murmurs, No rubs, No gallops. Chest wall nontender.  Musculoskeletal: Significant swelling, erythema and warmth from the base of the right thumb extending to the forearm with streaking to the upper arm. 2+ radial pulses. Slightly decreased range of motion of the right wrist due to pain, able to move the wrist and  normally. No axillary lymphadenopathy.   Integument: Warm, Dry, No erythema, No rash. No petechiae.  Neurologic: Alert & oriented x 3, Normal motor function, Normal sensory function, No focal deficits noted. Normal gait.  Psychiatric: Affect normal, Judgment normal, Mood normal. Cooperative.    LAB:  All pertinent labs reviewed and interpreted.  Recent Results (from the past 24 hour(s))   CBC (+ platelets, no diff)    Collection Time: 09/11/23  4:41 PM   Result Value Ref Range    WBC Count 11.3 (H) 4.0 - 11.0 10e3/uL    RBC Count 4.71 3.80 - 5.20 10e6/uL    Hemoglobin 12.5 11.7 - 15.7 g/dL    Hematocrit 38.9 35.0 - 47.0 %    MCV 83 78 - 100 fL    MCH 26.5 " 26.5 - 33.0 pg    MCHC 32.1 31.5 - 36.5 g/dL    RDW 13.3 10.0 - 15.0 %    Platelet Count 306 150 - 450 10e3/uL   Basic metabolic panel    Collection Time: 09/11/23  4:41 PM   Result Value Ref Range    Sodium 136 136 - 145 mmol/L    Potassium 3.5 3.4 - 5.3 mmol/L    Chloride 106 98 - 107 mmol/L    Carbon Dioxide (CO2) 20 (L) 22 - 29 mmol/L    Anion Gap 10 7 - 15 mmol/L    Urea Nitrogen 4.7 (L) 6.0 - 20.0 mg/dL    Creatinine 0.49 (L) 0.51 - 0.95 mg/dL    Calcium 8.2 (L) 8.6 - 10.0 mg/dL    Glucose 99 70 - 99 mg/dL    GFR Estimate >90 >60 mL/min/1.73m2   Lactic acid whole blood    Collection Time: 09/11/23  4:41 PM   Result Value Ref Range    Lactic Acid 1.0 0.7 - 2.0 mmol/L         RADIOLOGY:  Reviewed all pertinent imaging. Please see official radiology report.  US Upper Extremity Venous Duplex Right   Final Result   IMPRESSION:   1.  No deep venous thrombosis in the right upper extremity.   2.  Moderate subcutaneous edema at the forearm, wrist and thumb. Localized hyperemia underlying the palpable lumps as described above. Findings suspicious for cellulitis.          PROCEDURES:   None.       I, La Nena Davis, am serving as a scribe to document services personally performed by Mihaela Chavis PA-C based on my observation and the provider's statements to me. I, Mihaela Chavis PA-C attest that La Nena Davis is acting in a scribe capacity, has observed my performance of the services and has documented them in accordance with my direction.    Mihaela Chavis PA-C  Emergency Medicine  [facility]  9/11/2023       Mihaela Chavis PA-C  09/11/23 1933

## 2023-09-11 NOTE — ED TRIAGE NOTES
Pt is 31 weeks pregnant with edc of nov 8th.  Since yesterday, pts right arm and been getting red, more swollen and is burning. Pt isnt sure if she got bit by an insect or not.  Pt is not having contractions, no vaginal drainage. She has felt the baby move.  [facility] charge called and they said to see pt in the er and if monitoring is needed of the baby , they will come down to er to do that.

## 2023-09-11 NOTE — ED NOTES
Emergency Department Midlevel Supervisory Note     I personally saw the patient and performed a substantive portion of the visit including all aspects of the medical decision making.    ED Course:  4:34 PM  Mihaela Chavis PA-C staffed patient with me. I agree with their assessment and plan of management, and I will see the patient.  4:35 PM  I met with the patient to introduce myself, gather additional history, perform my initial exam, and discuss the plan.     Brief HPI:     Rashmi Oliva is a 28 year old female who presents for evaluation of right arm swelling.     I, Cathi Taylor, am serving as a scribe to document services personally performed by Denia Soto DO based on my observations and the provider's statements to me.   I, Denia Soto DO, attest that Cathi Taylor was acting in a scribe capacity, has observed my performance of the services and has documented them in accordance with my direction.    Brief Physical Exam:  Constitutional:  Alert, in no acute distress  EYES: Conjunctivae clear  HENT:  Atraumatic, normocephalic  Respiratory:  Respirations even, unlabored, in no acute respiratory distress  Cardiovascular:  Regular rate and rhythm, good peripheral perfusion  GI: Soft, nondistended, nontender, no palpable masses, no rebound, no guarding   Musculoskeletal:  Edema to right forearm, no palpable fluctuance.  2+ radial pulses Range of motion major extremities intact.    Integument: Warm, Dry.  Erythema and increased warmth to right forearm, redness tracing up just distal to axilla  Neurologic:  Alert & oriented, no focal deficits noted  Psych: Normal mood and affect     MDM:  Presenting for evaluation for right arm and swelling.  Feels that she may have been bit by something yesterday.  Had some erythema around a wound yesterday.  Awoke this morning and much worse.  Throughout the day now spreading up towards her axilla.  She is 31 weeks pregnant.  She does have some swelling to her right  forearm.  Is not tense and not consistent with compartment syndrome at this time.  No palpable abscess.  She has intact pulses.  She does have lymphangitis spreading towards her axilla.  Given rapid spread of symptoms, will admit.  Given IV ceftriaxone.       No diagnosis found.    Labs and Imaging:  Results for orders placed or performed during the hospital encounter of 09/11/23   CBC (+ platelets, no diff)   Result Value Ref Range    WBC Count 11.3 (H) 4.0 - 11.0 10e3/uL    RBC Count 4.71 3.80 - 5.20 10e6/uL    Hemoglobin 12.5 11.7 - 15.7 g/dL    Hematocrit 38.9 35.0 - 47.0 %    MCV 83 78 - 100 fL    MCH 26.5 26.5 - 33.0 pg    MCHC 32.1 31.5 - 36.5 g/dL    RDW 13.3 10.0 - 15.0 %    Platelet Count 306 150 - 450 10e3/uL   Basic metabolic panel   Result Value Ref Range    Sodium 136 136 - 145 mmol/L    Potassium 3.5 3.4 - 5.3 mmol/L    Chloride 106 98 - 107 mmol/L    Carbon Dioxide (CO2) 20 (L) 22 - 29 mmol/L    Anion Gap 10 7 - 15 mmol/L    Urea Nitrogen 4.7 (L) 6.0 - 20.0 mg/dL    Creatinine 0.49 (L) 0.51 - 0.95 mg/dL    Calcium 8.2 (L) 8.6 - 10.0 mg/dL    Glucose 99 70 - 99 mg/dL    GFR Estimate >90 >60 mL/min/1.73m2   Lactic acid whole blood   Result Value Ref Range    Lactic Acid 1.0 0.7 - 2.0 mmol/L     I have reviewed the relevant laboratory and radiology studies    Procedures:  I was present for the key portions of this procedure: none    Denia Soto DO  [facility] EMERGENCY DEPARTMENT  [address] [address] [phone]     Denia Soto DO  09/11/23 0939

## 2023-09-11 NOTE — PHARMACY-ADMISSION MEDICATION HISTORY
Pharmacist Admission Medication History    Admission medication history is complete. The information provided in this note is only as accurate as the sources available at the time of the update.    Medication reconciliation/reorder completed by provider prior to medication history? No    Information Source(s): Patient and CareEverywhere/SureScripts via in-person    Pertinent Information: N/A    Changes made to PTA medication list:  Added: None  Deleted: Ibuprofen  Changed: Miralax and pyridoxine to PRN    Allergies reviewed with patient and updates made in EHR: yes    Medication History Completed By: ZOILA PERRY RPH 9/11/2023 5:05 PM    Prior to Admission medications    Medication Sig Last Dose Taking? Auth Provider Long Term End Date   famotidine (PEPCID) 20 MG tablet Take 1 tablet (20 mg) by mouth 2 times daily for 120 days 9/11/2023 at am Yes Harsh Luong MD  12/30/23   insulin glargine (LANTUS PEN) 100 UNIT/ML pen Inject 4 Units Subcutaneous At Bedtime for 30 days 9/10/2023 at pm Yes Harsh Luong MD Yes 10/1/23   polyethylene glycol (MIRALAX) 17 g packet Take 1 packet by mouth daily as needed for constipation Past Month Yes Unknown, Entered By History     Prenatal Vit-Fe Fumarate-FA (PRENATAL MULTIVITAMIN W/IRON) 27-0.8 MG tablet Take 1 tablet by mouth daily 9/11/2023 at am Yes Ghislaine Rodriguez,      pyridOXINE (VITAMIN B6) 25 MG tablet Take 25 mg by mouth daily as needed (nausea) Past Month Yes Unknown, Entered By History No    blood glucose (NO BRAND SPECIFIED) test strip Use to test blood sugar 3 times daily or as directed.   Harsh Luong MD     blood glucose monitoring (ACCU-CHEK MULTICLIX) lancets Use to test blood sugar 3 times daily or as directed.   Harsh Luong MD     insulin pen needle (32G X 4 MM) 32G X 4 MM miscellaneous Use 1 pen needles daily or as directed.   Harsh Luong MD

## 2023-09-12 LAB
ERYTHROCYTE [DISTWIDTH] IN BLOOD BY AUTOMATED COUNT: 13.2 % (ref 10–15)
GLUCOSE BLDC GLUCOMTR-MCNC: 102 MG/DL (ref 70–99)
GLUCOSE BLDC GLUCOMTR-MCNC: 171 MG/DL (ref 70–99)
GLUCOSE BLDC GLUCOMTR-MCNC: 81 MG/DL (ref 70–99)
GLUCOSE BLDC GLUCOMTR-MCNC: 87 MG/DL (ref 70–99)
HCT VFR BLD AUTO: 35.2 % (ref 35–47)
HGB BLD-MCNC: 11.1 G/DL (ref 11.7–15.7)
MCH RBC QN AUTO: 26.4 PG (ref 26.5–33)
MCHC RBC AUTO-ENTMCNC: 31.5 G/DL (ref 31.5–36.5)
MCV RBC AUTO: 84 FL (ref 78–100)
PLATELET # BLD AUTO: 293 10E3/UL (ref 150–450)
RBC # BLD AUTO: 4.2 10E6/UL (ref 3.8–5.2)
WBC # BLD AUTO: 8.2 10E3/UL (ref 4–11)

## 2023-09-12 PROCEDURE — 250N000013 HC RX MED GY IP 250 OP 250 PS 637: Performed by: MASSAGE THERAPIST

## 2023-09-12 PROCEDURE — 85027 COMPLETE CBC AUTOMATED: CPT

## 2023-09-12 PROCEDURE — 36415 COLL VENOUS BLD VENIPUNCTURE: CPT

## 2023-09-12 PROCEDURE — 120N000001 HC R&B MED SURG/OB

## 2023-09-12 PROCEDURE — 99222 1ST HOSP IP/OBS MODERATE 55: CPT | Mod: AI

## 2023-09-12 PROCEDURE — 250N000013 HC RX MED GY IP 250 OP 250 PS 637

## 2023-09-12 PROCEDURE — 250N000011 HC RX IP 250 OP 636: Mod: JZ

## 2023-09-12 RX ORDER — ACETAMINOPHEN 325 MG/1
650 TABLET ORAL EVERY 4 HOURS PRN
Status: DISCONTINUED | OUTPATIENT
Start: 2023-09-12 | End: 2023-09-14 | Stop reason: HOSPADM

## 2023-09-12 RX ORDER — DIPHENHYDRAMINE HCL 25 MG
25 CAPSULE ORAL EVERY 6 HOURS PRN
Status: DISCONTINUED | OUTPATIENT
Start: 2023-09-12 | End: 2023-09-14 | Stop reason: HOSPADM

## 2023-09-12 RX ORDER — ACETAMINOPHEN 650 MG/1
650 SUPPOSITORY RECTAL EVERY 4 HOURS PRN
Status: DISCONTINUED | OUTPATIENT
Start: 2023-09-12 | End: 2023-09-14 | Stop reason: HOSPADM

## 2023-09-12 RX ADMIN — CEFAZOLIN SODIUM 2 G: 2 INJECTION, SOLUTION INTRAVENOUS at 06:50

## 2023-09-12 RX ADMIN — ACETAMINOPHEN 650 MG: 325 TABLET ORAL at 10:49

## 2023-09-12 RX ADMIN — CEFAZOLIN SODIUM 2 G: 2 INJECTION, SOLUTION INTRAVENOUS at 22:53

## 2023-09-12 RX ADMIN — DIPHENHYDRAMINE HYDROCHLORIDE 25 MG: 25 CAPSULE ORAL at 00:53

## 2023-09-12 RX ADMIN — CEFAZOLIN SODIUM 2 G: 2 INJECTION, SOLUTION INTRAVENOUS at 14:39

## 2023-09-12 RX ADMIN — INSULIN GLARGINE 4 UNITS: 100 INJECTION, SOLUTION SUBCUTANEOUS at 22:26

## 2023-09-12 RX ADMIN — ACETAMINOPHEN 650 MG: 325 TABLET ORAL at 22:26

## 2023-09-12 RX ADMIN — ACETAMINOPHEN 650 MG: 325 TABLET ORAL at 00:53

## 2023-09-12 RX ADMIN — FAMOTIDINE 20 MG: 20 TABLET ORAL at 22:26

## 2023-09-12 RX ADMIN — FAMOTIDINE 20 MG: 20 TABLET ORAL at 10:24

## 2023-09-12 ASSESSMENT — ACTIVITIES OF DAILY LIVING (ADL)
ADLS_ACUITY_SCORE: 20

## 2023-09-12 NOTE — H&P
[facility]    History and Physical - Hospitalist Service       Date of Admission:  2023    Assessment & Plan      Rashmi Oliva is a 28 year old who is  at 31w3d gestation who presents with one day of rash on her right arm, admitted on 2023 for cellulitis that appeared to be rapidly progressing.    Right Upper Extremity Cellulitis  No red flag symptoms, no signs of systemic infection, small areas of impetigo with yellow/clear drainage. Lymphangitis present.  Rapid progression of erythema initially with signs of receding after starting IV antibiotics. Lower concern for MRSA.  - Cefazolin 2g every 8 hours for 5 days  - Follow up skin culture  - follow up blood cultures    Pregnancy at 31w3d  Gestational Diabetes  Good fetal heart tones, positive fetal movement.    - Continuing Lantus 4U at bedtime  - Daily Fetal monitoring           Diet: Combination Diet Regular Diet Adult    DVT Prophylaxis: Pneumatic Compression Devices  Henson Catheter: Not present  Fluids: PO  Lines: None     Cardiac Monitoring: None  Code Status: Full Code      Clinically Significant Risk Factors Present on Admission                                  Disposition Plan      Expected Discharge Date: 2023                The patient's care was discussed with the Attending Physician, Dr. Bonner .      Arvin Harmon MD  Hospitalist Service  [facility]  Securely message with [employer] (more info)  Text page via [employer] Paging/Directory   ______________________________________________________________________    Chief Complaint   Right arm rash    History is obtained from the patient    History of Present Illness   Rashmi Oliva is a 28 year old who is  at 31w3d gestation who presents with one day of rash on her right arm.  She was in [place] this weekend vacationing with family.  She did some swimming in pools and spent some time at an arcade.  On  she noticed a  small rash on her arm and what looked like possible bug bite although she does not remember getting and bug bites.  The rash grew and her arm became itchy and swollen.  She tried putting ice on it although this did not help.  She does endorse itching and a burning feeling in her distal forearm.  Also endorses a couple areas of weeping.  Denies any wounds or injuries to her arm.  Has not had anything like this before.     Denies fevers, chills.  She did feel a little bit nauseous when she came to ED but thinks this could be due to her pregnancy.  Denies chest pain, shortness of breath, headaches.      Denies any issues with her pregnancy. Feeling fetal movement.  Denies vaginal bleeding or discharge.      Past Medical History    Past Medical History:   Diagnosis Date    Chlamydia     Gestational diabetes     Insulin controlled gestational diabetes mellitus (GDM) during pregnancy 03/2019    Wounds and injuries        Past Surgical History   Past Surgical History:   Procedure Laterality Date    SURGICAL PATHOLOGY EXAM  2015       Prior to Admission Medications   Prior to Admission Medications   Prescriptions Last Dose Informant Patient Reported? Taking?   Prenatal Vit-Fe Fumarate-FA (PRENATAL MULTIVITAMIN W/IRON) 27-0.8 MG tablet 9/11/2023 at am  No Yes   Sig: Take 1 tablet by mouth daily   blood glucose (NO BRAND SPECIFIED) test strip   No No   Sig: Use to test blood sugar 3 times daily or as directed.   blood glucose monitoring (ACCU-CHEK MULTICLIX) lancets   No No   Sig: Use to test blood sugar 3 times daily or as directed.   famotidine (PEPCID) 20 MG tablet 9/11/2023 at am  No Yes   Sig: Take 1 tablet (20 mg) by mouth 2 times daily for 120 days   insulin glargine (LANTUS PEN) 100 UNIT/ML pen 9/10/2023 at pm  No Yes   Sig: Inject 4 Units Subcutaneous At Bedtime for 30 days   insulin pen needle (32G X 4 MM) 32G X 4 MM miscellaneous   No No   Sig: Use 1 pen needles daily or as directed.   polyethylene glycol (MIRALAX)  17 g packet Past Month  Yes Yes   Sig: Take 1 packet by mouth daily as needed for constipation   pyridOXINE (VITAMIN B6) 25 MG tablet Past Month  Yes Yes   Sig: Take 25 mg by mouth daily as needed (nausea)      Facility-Administered Medications: None        Physical Exam   Vital Signs: Temp: 97.8  F (36.6  C) Temp src: Oral BP: 120/64 Pulse: 80   Resp: 12 SpO2: 100 % O2 Device: None (Room air)    Weight: 196 lbs 0 oz    Constitutional: awake, alert, cooperative, no apparent distress, and appears stated age  Eyes: Lids and lashes normal, pupils equal, round and reactive to light, extra ocular muscles intact, sclera clear, conjunctiva normal  ENT: Normocephalic, without obvious abnormality, atraumatic, external ears without lesions, oral pharynx with moist mucous membranes, tonsils without erythema or exudates, gums normal and good dentition.  Hematologic / Lymphatic: no cervical lymphadenopathy, no axillary lymphadenopathy  Respiratory: No increased work of breathing, good air exchange, clear to auscultation bilaterally, no crackles or wheezing  Cardiovascular: Normal apical impulse, regular rate and rhythm, normal S1 and S2, no S3 or S4, and no murmur noted  GI: Gravid abdomen, non-tender, bowel sounds heard, fetal heart tones to be documented by nursing.  Skin/Extremities: Confluent erythematous and swollen area of skin involving right thumb, wrist, and forearm with poorly defined borders and redness streaking towards the right axilla. Non-raised erythema. Tender in distal forearm and wrist.  No crepitus, no pain out of proportion  Musculoskeletal: no lower extremity pitting edema present  Neurologic: Awake, alert, oriented      Data     I have personally reviewed the following data over the past 24 hrs:    11.3 (H)  \   12.5   / 306     136 106 4.7 (L) /  99   3.5 20 (L) 0.49 (L) \     Procal: N/A CRP: N/A Lactic Acid: 1.0         Imaging results reviewed over the past 24 hrs:   Recent Results (from the past 24  hour(s))   US Upper Extremity Venous Duplex Right    Narrative    EXAM: US UPPER EXTREMITY VENOUS DUPLEX RIGHT  LOCATION: [facility]  DATE: 9/11/2023    INDICATION: right arm pain, swelling, redness; pregnant  COMPARISON: None.  TECHNIQUE: Venous Duplex ultrasound of the right upper extremity with (when possible) and without compression, augmentation, and duplex. Color flow and spectral Doppler with waveform analysis performed.    FINDINGS: Ultrasound includes evaluation of the internal jugular vein, innominate vein, subclavian vein, axillary vein, and brachial vein. The superficial cephalic and basilic veins were also evaluated where seen.     RIGHT: No deep venous thrombosis. No superficial thrombophlebitis. Moderate subcutaneous edema is edema throughout the mid to distal forearm extending to the thumb. Hyperemic changes and color duplex and spectral Doppler underlying the palpable lumps at   the mid forearm, wrist and thumb.      Impression    IMPRESSION:  1.  No deep venous thrombosis in the right upper extremity.  2.  Moderate subcutaneous edema at the forearm, wrist and thumb. Localized hyperemia underlying the palpable lumps as described above. Findings suspicious for cellulitis.

## 2023-09-12 NOTE — PROGRESS NOTES
[facility]    Progress Note - Hospitalist Service       Date of Admission:  2023    Assessment & Plan   Rashmi Oliva is a 28 year old female  at 31w4d gestation who presents with one day of redness and swelling of right arm, admitted on 2023 for cellulitis that appeared to be rapidly progressing.     Right Upper Extremity Cellulitis  First noticed 2 red bumps on right forearm and thought to be horsefly bites acquired during family trip to [place]. Tried ice packs without relief. Redness and swelling worsened so she presented to ED on . Vitally stable on admission. On exam, no red flag symptoms or signs of systemic infection. Significant edema and erythema extending from right wrist to antecubital fossa. Also with small areas of impetigo with yellow/clear drainage. Skin swabs collected, cultures pending. Blood cultures also collected, no growth after 12 hours. RUE US without DVT. Now with signs of receding erythema and edema after one time dose of 2g Rocephin and continued cefazolin. Today with complaints of pruritus of right forearm.   - Cefazolin 2g every 8 hours for 5 days (on day 2)  - skin cultures pending  - Benadryl 25 mg q6 hours PRN itching     Pregnancy at 31w4d  Gestational Diabetes, insulin controlled   at 31w4d gestation with good fetal heart tones and positive fetal movement. No contractions or fluid loss. Also with gestational diabetes managed with lantus 4u at home. Morning BG 81.   - continue Lantus 4U at bedtime  - glucose monitoring  - low carbohydrate diet  - daily fetal heart tone monitoring       Diet: Combination Diet Regular Diet Adult  Room Service    DVT Prophylaxis: Pneumatic Compression Devices  Henson Catheter: Not present  Fluids: PO  Lines: None     Cardiac Monitoring: None  Code Status: Full Code      Clinically Significant Risk Factors Present on Admission                                Disposition Plan      Expected Discharge Date:  09/13/2023                The patient's care was discussed with the Resident Physician, Dr. Harmon and Attending Physician, Dr. Desai .    [employer]  Medical Student Year 4  Hospitalist Service  [facility]  Securely message with [employer] info)  Text page via [facility] Paging/Directory     I was present with the medical student who participated in the service and in the documentation of this note. I have verified the history and personally performed the physical exam and medical decision making, and have verified the content of the note, which accurately reflects my assessment of the patient and the plan of care.      -Arvin Harmon, Resident MD  ______________________________________________________________________    Interval History     Per nursing notes overnight had increased itchiness and pain in right arm. Given Tylenol and Benadryl with good effect.     Slept well. Continues to have itching in right arm. Has noticed some improvement in the swelling of the right hand/wrist and is now able to move it more. No burning or pain. No nausea or vomiting. Denies fever or chills.     Continues to feel fetal movement. No contractions. No changes in vaginal discharge or loss of fluid. No swelling of other extremities.     Physical Exam   Vital Signs: Temp: 97.9  F (36.6  C) Temp src: Oral BP: 108/50 Pulse: 79   Resp: 20 SpO2: 99 % O2 Device: None (Room air)    Weight: 192 lbs .33 oz    Constitutional: awake, alert, cooperative, no apparent distress, and appears stated age  Eyes: Lids and lashes normal, pupils equal, round; extra ocular muscles intact, sclera clear, conjunctiva normal  ENT: Normocephalic, without obvious abnormality, atraumatic.  Hematologic / Lymphatic: no cervical lymphadenopathy, no axillary lymphadenopathy  Respiratory: No increased work of breathing, good air exchange, clear to auscultation bilaterally, no crackles or wheezing  Cardiovascular: Regular rate and rhythm,  normal S1 and S2, no S3 or S4, and no murmur noted  GI: Gravid abdomen, non-tender, bowel sounds heard, fetal heart tones documented by nursing  Skin/Extremities: Confluent erythema and edema of skin extending from right wrist to antecubital fossa with poorly defined borders though remains in the marked borders from yesterday. Mildly tender in distal forearm and wrist.  No crepitus or pain out of proportion  Musculoskeletal: no lower extremity pitting edema present  Neurologic: Awake, alert, oriented to person, place, time    Data     I have personally reviewed the following data over the past 24 hrs:    8.2  \   11.1 (L)   / 293     136 106 4.7 (L) /  81   3.5 20 (L) 0.49 (L) \     Procal: N/A CRP: N/A Lactic Acid: 1.0

## 2023-09-12 NOTE — UTILIZATION REVIEW
Admission Status; Secondary Review Determination   Under the authority of the Utilization Management Committee, the utilization review process indicated a secondary review on Rashmi Oliva. The review outcome is based on review of the medical records, discussions with staff, and applying clinical experience noted on the date of the review.   (x) Inpatient Status Appropriate - This patient's medical care is consistent with medical management for inpatient care and reasonable inpatient medical practice.     RATIONALE FOR DETERMINATION   Rashmi Oliva is a 28 y rold female  at 31w4d gestation who presented with one day of right arm swelling and redness.  Rapid progression cellulitis.  Ongoing edema/redness right right to antecubital fossa.  Blood cultures continue in process.  IV cefazolin continues.  Is also gestational diabetic raising risk for complications.  At this time, anticipated to require a second night.    At the time of admission with the information available to the attending physician more than 2 nights Hospital complex care was anticipated, based on patient risk of adverse outcome if treated as outpatient and complex care required. Inpatient admission is appropriate based on the Medicare guidelines.   The information on this document is developed by the utilization review team in order for the business office to ensure compliance. This only denotes the appropriateness of proper admission status and does not reflect the quality of care rendered.   The definitions of Inpatient Status and Observation Status used in making the determination above are those provided in the CMS Coverage Manual, Chapter 1 and Chapter 6, section 70.4.   Sincerely,   Rosanna Cooper MD  Utilization Review  Physician Advisor  [facility]

## 2023-09-12 NOTE — PLAN OF CARE
Goal Outcome Evaluation:    Slept well. Endorsed 6/10 right hand, arm and wrist pain. Reports arm and wrist feels like it is swelling up again- redness is outlined and redness has not exceeded markings. X2 puncture or bite marks appear on both medial and lateral sides of forearm- no drainage and open to air.     Pt reporting right arm feeling itchy and painful at 0100- no PRN's- resident paged and PRN tylenol and benadryl given with good effect.     Temp: 97.8  F (36.6  C) Temp src: Oral BP: 113/56 Pulse: 68   Resp: 22 SpO2: 96 % O2 Device: None (Room air)

## 2023-09-12 NOTE — ED NOTES
"[facility] ED Handoff Report    ED Chief Complaint: Right arm redness and swelling    ED Diagnosis:  (L03.90) Cellulitis  Comment:   Plan: Admit       PMH:    Past Medical History:   Diagnosis Date    Chlamydia     Gestational diabetes     Insulin controlled gestational diabetes mellitus (GDM) during pregnancy 03/2019    Wounds and injuries         Code Status:  Prior     Falls Risk: No Band: Not applicable    Current Living Situation/Residence: lives in a house     Elimination Status: Continent: Yes     Activity Level: Independent    Patients Preferred Language:  English     Needed: No    Vital Signs:  /70   Pulse 74   Temp 97.8  F (36.6  C) (Oral)   Resp 12   Ht 1.575 m (5' 2\")   Wt 88.9 kg (196 lb)   LMP 02/03/2023   SpO2 100%   BMI 35.85 kg/m       Cardiac Rhythm:     Pain Score: 2/10, was given tylenol, states the pain in better    Is the Patient Confused:  No    Last Food or Drink: 09/11/23    Focused Assessment:  Patient noticed redness and swelling on right arm yesterday that has worsened today. Patient has redness over a majority of her right forearm. There is also redness above right elbow that has red streaking up right bicep. There are 4 areas that appear blistered where patient thinks she may have been bit. Patient is currently 31 weeks pregnant.    Tests Performed: Done: Labs and Imaging    Treatments Provided:  See MAR    Family Dynamics/Concerns: No    Family Updated On Visitor Policy: No, patient will contact family    Plan of Care Communicated to Family: No, patient will contact family    Who Was Updated about Plan of Care: patient will contact family    Belongings Checklist Done and Signed by Patient: Yes    Medications sent with patient: n/a    Additional Information:     RN: Nga Blount RN 9/11/2023 7:32 PM       "

## 2023-09-12 NOTE — ED PROVIDER NOTES
Emergency Department Midlevel Supervisory Note     I personally saw the patient and performed a substantive portion of the visit including all aspects of the medical decision making.    ED Course:  4:34 PM  Mihaela Chavis PA-C staffed patient with me. I agree with their assessment and plan of management, and I will see the patient.  4:35 PM  I met with the patient to introduce myself, gather additional history, perform my initial exam, and discuss the plan.     Brief HPI:     Rashmi Oliva is a 28 year old female who presents for evaluation of right arm swelling.     I, Cathi Taylor, am serving as a scribe to document services personally performed by Denia Soto DO based on my observations and the provider's statements to me.   I, Denia Soto DO, attest that Cathi Taylor was acting in a scribe capacity, has observed my performance of the services and has documented them in accordance with my direction.    Brief Physical Exam:  Constitutional:  Alert, in no acute distress  EYES: Conjunctivae clear  HENT:  Atraumatic, normocephalic  Respiratory:  Respirations even, unlabored, in no acute respiratory distress  Cardiovascular:  Regular rate and rhythm, good peripheral perfusion  GI: Soft, nondistended, nontender, no palpable masses, no rebound, no guarding   Musculoskeletal:  Edema to right forearm, no palpable fluctuance.  2+ radial pulses Range of motion major extremities intact.    Integument: Warm, Dry.  Erythema and increased warmth to right forearm, redness tracing up just distal to axilla  Neurologic:  Alert & oriented, no focal deficits noted  Psych: Normal mood and affect     MDM:  Presenting for evaluation for right arm and swelling.  Feels that she may have been bit by something yesterday.  Had some erythema around a wound yesterday.  Awoke this morning and much worse.  Throughout the day now spreading up towards her axilla.  She is 31 weeks pregnant.  She does have some swelling to her right  forearm.  Is not tense and not consistent with compartment syndrome at this time.  No palpable abscess.  She has intact pulses.  She does have lymphangitis spreading towards her axilla.  Given rapid spread of symptoms, will admit.  Given IV ceftriaxone.       No diagnosis found.    Labs and Imaging:  Results for orders placed or performed during the hospital encounter of 09/11/23   CBC (+ platelets, no diff)   Result Value Ref Range    WBC Count 11.3 (H) 4.0 - 11.0 10e3/uL    RBC Count 4.71 3.80 - 5.20 10e6/uL    Hemoglobin 12.5 11.7 - 15.7 g/dL    Hematocrit 38.9 35.0 - 47.0 %    MCV 83 78 - 100 fL    MCH 26.5 26.5 - 33.0 pg    MCHC 32.1 31.5 - 36.5 g/dL    RDW 13.3 10.0 - 15.0 %    Platelet Count 306 150 - 450 10e3/uL   Basic metabolic panel   Result Value Ref Range    Sodium 136 136 - 145 mmol/L    Potassium 3.5 3.4 - 5.3 mmol/L    Chloride 106 98 - 107 mmol/L    Carbon Dioxide (CO2) 20 (L) 22 - 29 mmol/L    Anion Gap 10 7 - 15 mmol/L    Urea Nitrogen 4.7 (L) 6.0 - 20.0 mg/dL    Creatinine 0.49 (L) 0.51 - 0.95 mg/dL    Calcium 8.2 (L) 8.6 - 10.0 mg/dL    Glucose 99 70 - 99 mg/dL    GFR Estimate >90 >60 mL/min/1.73m2   Lactic acid whole blood   Result Value Ref Range    Lactic Acid 1.0 0.7 - 2.0 mmol/L     I have reviewed the relevant laboratory and radiology studies    Procedures:  I was present for the key portions of this procedure: none    Denia Soto DO  [facility] EMERGENCY DEPARTMENT  [facility] [facility] [id number] [phone]     Denia Soto DO  09/11/23 [id number]     Denia Soto DO  09/11/23 [id number]

## 2023-09-13 LAB
GLUCOSE BLDC GLUCOMTR-MCNC: 115 MG/DL (ref 70–99)
GLUCOSE BLDC GLUCOMTR-MCNC: 119 MG/DL (ref 70–99)
GLUCOSE BLDC GLUCOMTR-MCNC: 124 MG/DL (ref 70–99)
GLUCOSE BLDC GLUCOMTR-MCNC: 124 MG/DL (ref 70–99)
GLUCOSE BLDC GLUCOMTR-MCNC: 99 MG/DL (ref 70–99)

## 2023-09-13 PROCEDURE — 250N000011 HC RX IP 250 OP 636: Mod: JZ

## 2023-09-13 PROCEDURE — 250N000013 HC RX MED GY IP 250 OP 250 PS 637: Performed by: MASSAGE THERAPIST

## 2023-09-13 PROCEDURE — 250N000013 HC RX MED GY IP 250 OP 250 PS 637

## 2023-09-13 PROCEDURE — 120N000001 HC R&B MED SURG/OB

## 2023-09-13 PROCEDURE — 99232 SBSQ HOSP IP/OBS MODERATE 35: CPT | Mod: GC | Performed by: STUDENT IN AN ORGANIZED HEALTH CARE EDUCATION/TRAINING PROGRAM

## 2023-09-13 RX ORDER — CEPHALEXIN 500 MG/1
1000 CAPSULE ORAL EVERY 6 HOURS SCHEDULED
Status: CANCELLED | OUTPATIENT
Start: 2023-09-14 | End: 2023-09-16

## 2023-09-13 RX ADMIN — FAMOTIDINE 20 MG: 20 TABLET ORAL at 10:13

## 2023-09-13 RX ADMIN — CEFAZOLIN SODIUM 2 G: 2 INJECTION, SOLUTION INTRAVENOUS at 06:51

## 2023-09-13 RX ADMIN — CEFAZOLIN SODIUM 2 G: 2 INJECTION, SOLUTION INTRAVENOUS at 23:20

## 2023-09-13 RX ADMIN — DIPHENHYDRAMINE HYDROCHLORIDE 25 MG: 25 CAPSULE ORAL at 14:19

## 2023-09-13 RX ADMIN — ACETAMINOPHEN 650 MG: 325 TABLET ORAL at 14:19

## 2023-09-13 RX ADMIN — ACETAMINOPHEN 650 MG: 325 TABLET ORAL at 07:10

## 2023-09-13 RX ADMIN — CEFAZOLIN SODIUM 2 G: 2 INJECTION, SOLUTION INTRAVENOUS at 14:19

## 2023-09-13 RX ADMIN — FAMOTIDINE 20 MG: 20 TABLET ORAL at 20:42

## 2023-09-13 ASSESSMENT — ACTIVITIES OF DAILY LIVING (ADL)
ADLS_ACUITY_SCORE: 20

## 2023-09-13 NOTE — PROGRESS NOTES
[facility]    Progress Note - Hospitalist Service       Date of Admission:  2023    Assessment & Plan   Rashmi Oliva is a 28 year old female  at 31w4d gestation who presents with one day of redness and swelling of right arm, admitted on 2023 for cellulitis that appeared to be rapidly progressing.     Right Upper Extremity Cellulitis  First noticed 2 red bumps on right forearm and thought to be horsefly bites acquired during family trip to [place]. Tried ice packs without relief. Redness and swelling worsened so she presented to ED on . Vitally stable on admission. On exam, no red flag symptoms or signs of systemic infection. Significant edema and erythema extending from right wrist to antecubital fossa. Also with small areas of impetigo with yellow/clear drainage. Skin swabs collected, cultures with no growth to date. Blood cultures also collected, no growth to date. RUE US without DVT. On  with signs of receding erythema and edema after one time dose of 2g Rocephin and continued cefazolin. Today () with markedly improved erythema and edema.   - IV Cefazolin 2g every 8 hours for 5 days (on day 3); plan to transition to oral cephalexin for remaining course   - skin cultures pending  - Benadryl 25 mg q6 hours PRN itching     Pregnancy at 31w5d  Gestational Diabetes, insulin controlled   at 31w5d gestation with good fetal heart tones and positive fetal movement. No contractions or fluid loss. Also with gestational diabetes managed with lantus 4u at home. Has been following with Dr. Luong for her OB care. Morning fasting sugars have been slightly elevated to 99. However has noted some elevated BG readings of 147-195 on home logs 1-2 hours after evening meals. Here noted BG of 171 in evening.  - Lantus to 4U at bedtime  - mealtime aspart SSI  - glucose monitoring  - low carbohydrate diet  - daily fetal heart tone monitoring     Diet: Combination Diet Regular  Diet Adult  Room Service    DVT Prophylaxis: Pneumatic Compression Devices  Henson Catheter: Not present  Fluids: PO  Lines: None     Cardiac Monitoring: None  Code Status: Full Code      Clinically Significant Risk Factors                                  Disposition Plan      Expected Discharge Date: 09/13/2023        Discharge Comments: pending cultures        The patient's care was discussed with the Resident Physician, Dr. Harmon and Attending Physician, Dr. Benítez .    [employer]  Medical Student Year 4  Hospitalist Service  [facility]  Securely message with Vocera (more info)  Text page via [facility] Paging/Directory     I was present with the medical student who participated in the service and in the documentation of this note. I have verified the history and personally performed the physical exam and medical decision making, and have verified the content of the note, which accurately reflects my assessment of the patient and the plan of care.      -Arvin Harmon, Resident MD  ______________________________________________________________________    Interval History     Per nursing notes overnight had some pain in the right forearm which improved with Tylenol. Also tried ice packs which helped.     Doing well today. Swelling and redness of right forearm is much improved from yesterday. Wrist and hand feel back to normal. Denies any pain. No fevers or chills. The other insect bites on her feet have not changed and are not tender or swollen.     Fetal movement is present. No contractions or loss of fluid. Has been monitoring blood sugars 3-4x/day at home. She checks in the morning, 2 hours after lunch, and 1 hour after her evening meal. She has kept a log which shows elevated numbers consistently in 140s-150s and up to 195 in the evenings. Feels like the baby craves sweets in the evenings and she tries not to eat sweets though when she does her numbers are higher.     Physical Exam    Vital Signs: Temp: 98.6  F (37  C) Temp src: Oral BP: 93/55 Pulse: 72   Resp: 16 SpO2: 98 % O2 Device: None (Room air)    Weight: 192 lbs .33 oz    Constitutional: awake, alert, cooperative, no apparent distress, and appears stated age  Eyes: Lids and lashes normal, pupils equal, round; extra ocular muscles intact, sclera clear, conjunctiva normal  ENT: Normocephalic, without obvious abnormality, atraumatic.  Respiratory: No increased work of breathing, good air exchange, clear to auscultation bilaterally, no crackles or wheezing  Cardiovascular: Regular rate and rhythm, normal S1 and S2, no S3 or S4, and no murmur noted  GI: Gravid abdomen, non-tender, bowel sounds heard, fetal heart tones documented by nursing  Skin/Extremities: Decreased erythema and edema of right forearm skin compared to exam yesterday. Borders have receded from originally marked area. Non-tender to palpation.  No crepitus or pain out of proportion  Musculoskeletal: no lower extremity pitting edema present  Neurologic: Awake, alert, oriented to person, place, time    Data

## 2023-09-13 NOTE — PLAN OF CARE
Problem: Pain Acute  Goal: Optimal Pain Control and Function  Outcome: Progressing  Intervention: Develop Pain Management Plan  Recent Flowsheet Documentation  Taken 9/12/2023 2000 by Shauna Solano RN  Pain Management Interventions: medication (see MAR)  Intervention: Prevent or Manage Pain  Recent Flowsheet Documentation  Taken 9/13/2023 0100 by Shauna Solano RN  Medication Review/Management: medications reviewed  Taken 9/12/2023 2000 by Shauna Solano RN  Medication Review/Management: medications reviewed   Goal Outcome Evaluation:    Patient complained of some pain in right forearm, PRN tylenol given and effective per patient. IV antibiotics given per order, new PIV placed this morning, slept well between cares.     Shauna Solano, RN

## 2023-09-14 VITALS
OXYGEN SATURATION: 98 % | HEIGHT: 62 IN | WEIGHT: 192.02 LBS | TEMPERATURE: 98.1 F | RESPIRATION RATE: 18 BRPM | DIASTOLIC BLOOD PRESSURE: 77 MMHG | SYSTOLIC BLOOD PRESSURE: 118 MMHG | HEART RATE: 90 BPM | BODY MASS INDEX: 35.34 KG/M2

## 2023-09-14 PROBLEM — L03.90 CELLULITIS: Chronic | Status: ACTIVE | Noted: 2023-09-11

## 2023-09-14 PROBLEM — L03.119 CELLULITIS OF EXTREMITY: Status: ACTIVE | Noted: 2023-09-11

## 2023-09-14 LAB
BACTERIA WND CULT: ABNORMAL
GLUCOSE BLDC GLUCOMTR-MCNC: 112 MG/DL (ref 70–99)
GLUCOSE BLDC GLUCOMTR-MCNC: 89 MG/DL (ref 70–99)

## 2023-09-14 PROCEDURE — 99238 HOSP IP/OBS DSCHRG MGMT 30/<: CPT | Mod: GC

## 2023-09-14 PROCEDURE — 250N000013 HC RX MED GY IP 250 OP 250 PS 637: Performed by: MASSAGE THERAPIST

## 2023-09-14 PROCEDURE — 250N000013 HC RX MED GY IP 250 OP 250 PS 637

## 2023-09-14 PROCEDURE — 250N000011 HC RX IP 250 OP 636: Mod: JZ

## 2023-09-14 RX ORDER — CEPHALEXIN 500 MG/1
500 CAPSULE ORAL 4 TIMES DAILY
Qty: 16 CAPSULE | Refills: 0 | Status: SHIPPED | OUTPATIENT
Start: 2023-09-14 | End: 2023-10-19

## 2023-09-14 RX ORDER — CETIRIZINE HYDROCHLORIDE 10 MG/1
5 TABLET ORAL DAILY PRN
Qty: 30 TABLET | Refills: 0 | Status: SHIPPED | OUTPATIENT
Start: 2023-09-14

## 2023-09-14 RX ADMIN — DIPHENHYDRAMINE HYDROCHLORIDE 25 MG: 25 CAPSULE ORAL at 08:18

## 2023-09-14 RX ADMIN — FAMOTIDINE 20 MG: 20 TABLET ORAL at 08:18

## 2023-09-14 RX ADMIN — CEFAZOLIN SODIUM 2 G: 2 INJECTION, SOLUTION INTRAVENOUS at 06:23

## 2023-09-14 ASSESSMENT — ACTIVITIES OF DAILY LIVING (ADL)
ADLS_ACUITY_SCORE: 20

## 2023-09-14 NOTE — PLAN OF CARE
Problem: Infection  Goal: Absence of Infection Signs and Symptoms  Outcome: Progressing     Problem: Pain Acute  Goal: Optimal Pain Control and Function  Outcome: Progressing  Intervention: Prevent or Manage Pain  Recent Flowsheet Documentation  Taken 9/13/2023 2044 by Araceli Garcia RN  Medication Review/Management: medications reviewed   Goal Outcome Evaluation: Pt stable overnight. Independent in room. RUE is reddened, no numbness reported. Radial pulse 2+. 2/10 pain only.  Vitals stable. IV cefazolin infused as ordered. No significant events overnight.

## 2023-09-14 NOTE — DISCHARGE SUMMARY
[facility]  Discharge Summary - Medicine & Pediatrics       Date of Admission:  9/11/2023  Date of Discharge:  9/14/2023 12:49 PM  Discharging Provider: Dr. Eden Sahni  Discharge Service: Hospitalist Service    Discharge Diagnoses   Right Upper Extremity Cellulitis   Pregnancy at 31w5d  Gestational Diabetes, insulin controlled    Clinically Significant Risk Factors          Follow-ups Needed After Discharge   Follow-up Appointments     Follow-up and recommended labs and tests       Follow up with primary care provider, Emmie Vizcarra, within 7 days to   evaluate Lantus change, follow-up gestational diabetes, and for hospital   follow- up (cellulitis).            Unresulted Labs Ordered in the Past 30 Days of this Admission       Date and Time Order Name Status Description    9/11/2023  5:01 PM Blood Culture Peripheral Blood Preliminary     9/11/2023  5:01 PM Blood Culture Peripheral Blood Preliminary             Discharge Disposition   Discharged to home  Condition at discharge: Stable    Hospital Course   Rashmi Oliva was admitted on 9/11/2023 for Cellulitis of her upper extremity.  The following problems were addressed during her hospitalization:    Right Upper Extremity Cellulitis  First noticed 2 red bumps on right forearm and thought to be horsefly bites acquired during family trip to [place]. Tried ice packs without relief. Redness and swelling worsened so she presented to ED on 9/11. Vitally stable on admission. On exam, no red flag symptoms or signs of systemic infection. Significant edema and erythema extending from right wrist to antecubital fossa. Also with small areas of impetigo with yellow/clear drainage. Skin swabs collected grew Staph epidermidis. Blood cultures also collected, no growth to date. RUE US without DVT. On 9/12 with signs of receding erythema and edema after one time dose of 2g Rocephin and continued cefazolin. Today (9/13) with markedly  improved erythema and edema. Transitioned to Cephalexin on .     Pregnancy at 31w5d  Gestational Diabetes, insulin controlled   at 31w5d gestation with good fetal heart tones and positive fetal movement. No contractions or fluid loss. Also with gestational diabetes managed with lantus 4u at home. Has been following with Dr. Luong for her OB care. Morning fasting sugars have been slightly elevated to 99. However has noted some elevated BG readings of 147-195 on home logs 1-2 hours after evening meals. Increased Lantus to 15U.      Consultations This Hospital Stay   None    Code Status   Prior       The patient was discussed with Dr. Luc Harmon MD  [address] [address]  Phone: [phone]  Fax: [phone]  ______________________________________________________________________    Physical Exam   Vital Signs: Temp: 98.1  F (36.7  C) Temp src: Oral BP: 118/77 Pulse: 90   Resp: 18 SpO2: 98 % O2 Device: None (Room air)    Weight: 192 lbs .33 oz    Constitutional: awake, alert, cooperative, no apparent distress, and appears stated age  Eyes: Lids and lashes normal, pupils equal, round; extra ocular muscles intact, sclera clear, conjunctiva normal  ENT: Normocephalic, without obvious abnormality, atraumatic.  Respiratory: No increased work of breathing, good air exchange, clear to auscultation bilaterally, no crackles or wheezing  Cardiovascular: Regular rate and rhythm, normal S1 and S2, no S3 or S4, and no murmur noted  GI: Gravid abdomen, non-tender, bowel sounds heard, fetal heart tones documented by nursing  Skin/Extremities: Decreased erythema and edema of right forearm skin compared to exam yesterday. Borders have receded from originally marked area. Non-tender to palpation.  No crepitus or pain out of proportion  Musculoskeletal: no lower extremity pitting edema present  Neurologic: Awake, alert, oriented to person,  place, time    Primary Care Physician   Emmie Vizcarra    Discharge Orders      Reason for your hospital stay    You were admitted for cellulitis.  This was treated with IV antibiotics.     Follow-up and recommended labs and tests     Follow up with primary care provider, Emmie Vizcarra, within 7 days to evaluate Lantus change, follow-up gestational diabetes, and for hospital follow- up (cellulitis).     Activity    Your activity upon discharge: activity as tolerated     Diet    Follow this diet upon discharge: Orders Placed This Encounter      Consistent Carb Diet Regular Adult       Significant Results and Procedures   Results for orders placed or performed during the hospital encounter of 09/11/23   US Upper Extremity Venous Duplex Right    Narrative    EXAM: US UPPER EXTREMITY VENOUS DUPLEX RIGHT  LOCATION: [facility]  DATE: 9/11/2023    INDICATION: right arm pain, swelling, redness; pregnant  COMPARISON: None.  TECHNIQUE: Venous Duplex ultrasound of the right upper extremity with (when possible) and without compression, augmentation, and duplex. Color flow and spectral Doppler with waveform analysis performed.    FINDINGS: Ultrasound includes evaluation of the internal jugular vein, innominate vein, subclavian vein, axillary vein, and brachial vein. The superficial cephalic and basilic veins were also evaluated where seen.     RIGHT: No deep venous thrombosis. No superficial thrombophlebitis. Moderate subcutaneous edema is edema throughout the mid to distal forearm extending to the thumb. Hyperemic changes and color duplex and spectral Doppler underlying the palpable lumps at   the mid forearm, wrist and thumb.      Impression    IMPRESSION:  1.  No deep venous thrombosis in the right upper extremity.  2.  Moderate subcutaneous edema at the forearm, wrist and thumb. Localized hyperemia underlying the palpable lumps as described above. Findings suspicious for cellulitis.        Discharge Medications   Discharge Medication List as of 9/14/2023 12:35 PM        START taking these medications    Details   cephALEXin (KEFLEX) 500 MG capsule Take 1 capsule (500 mg) by mouth 4 times daily, Disp-16 capsule, R-0, E-Prescribe      cetirizine (ZYRTEC) 10 MG tablet Take 0.5 tablets (5 mg) by mouth daily as needed for allergies (itching), Disp-30 tablet, R-0, E-Prescribe           CONTINUE these medications which have CHANGED    Details   insulin glargine (LANTUS PEN) 100 UNIT/ML pen Inject 5 Units Subcutaneous At Bedtime, Disp-1.2 mL, R-0, E-PrescribeIf Lantus is not covered by insurance, may substitute Basaglar or Semglee or other insulin glargine product per insurance preference at same dose and frequency.             CONTINUE these medications which have NOT CHANGED    Details   famotidine (PEPCID) 20 MG tablet Take 1 tablet (20 mg) by mouth 2 times daily for 120 days, Disp-60 tablet, R-3, E-Prescribe      polyethylene glycol (MIRALAX) 17 g packet Take 1 packet by mouth daily as needed for constipation, Historical      Prenatal Vit-Fe Fumarate-FA (PRENATAL MULTIVITAMIN W/IRON) 27-0.8 MG tablet Take 1 tablet by mouth daily, Disp-90 tablet, R-3, E-Prescribe      pyridOXINE (VITAMIN B6) 25 MG tablet Take 25 mg by mouth daily as needed (nausea), Historical      blood glucose (NO BRAND SPECIFIED) test strip Use to test blood sugar 3 times daily or as directed., Disp-100 strip, R-3, E-Prescribe      blood glucose monitoring (ACCU-CHEK MULTICLIX) lancets Use to test blood sugar 3 times daily or as directed.Disp-1 each, [id number]      insulin pen needle (32G X 4 MM) 32G X 4 MM miscellaneous Use 1 pen needles daily or as directed.Disp-50 each, [id number]           Allergies   Allergies   Allergen Reactions    Banana Swelling     Swollen throat    Sulfa Antibiotics Hives

## 2023-09-14 NOTE — PLAN OF CARE
"  Problem: Infection  Goal: Absence of Infection Signs and Symptoms  Outcome: Progressing     Problem: Infection  Goal: Absence of Infection Signs and Symptoms  Outcome: Progressing     Problem: Pain Acute  Goal: Optimal Pain Control and Function  Outcome: Progressing  Intervention: Prevent or Manage Pain  Recent Flowsheet Documentation  Taken 9/13/2023 1830 by Leelee Farley, RN  Medication Review/Management: medications reviewed  Taken 9/13/2023 1021 by Leelee Farley RN  Medication Review/Management: medications reviewed   Goal Outcome Evaluation:       Pt is pleasant and coopertive with all cares and treatments.     Right  lower arm with erythema and edema noted. Area that was demarcated  has not spread out any further, rather has receded since assessment yesterday. There is a small 1 cm circular area that on lower arm that is not draining, but appears raised and open in nature.Pt stated that the area is painful at times but that it is not \"unbearable\". She requested Tylenol x 1, and benedryl x 1 this shift. Ice was also applied to the area x 4 this shift.     Blood cultures are still pending     PIV left arm P/I and running Abx as ordered     Fetal heart tones were done by [facility] nurse this morning.     Pt up and ambulating in the hallway as well as in her room.     Eating well food brought in from family and denies nausea or vomit               "

## 2023-09-15 ENCOUNTER — PATIENT OUTREACH (OUTPATIENT)
Dept: CARE COORDINATION | Facility: CLINIC | Age: 28
End: 2023-09-15
Payer: COMMERCIAL

## 2023-09-15 ASSESSMENT — ACTIVITIES OF DAILY LIVING (ADL): DEPENDENT_IADLS:: INDEPENDENT

## 2023-09-15 NOTE — PROGRESS NOTES
Clinic Care Coordination Contact  [facility]: Post-Discharge Note  SITUATION                                                      Admission:    Admission Date: 09/11/23   Reason for Admission: Right Upper Extremity Cellulitis   Pregnancy at 31w5d  Gestational Diabetes, insulin controlled  Discharge:   Discharge Date: 09/14/23  Discharge Diagnosis: Right Upper Extremity Cellulitis   Pregnancy at 31w5d  Gestational Diabetes, insulin controlled    BACKGROUND                                                      Per hospital discharge summary and inpatient provider notes:      ASSESSMENT           Discharge Assessment  How are you doing now that you are home?: Pt is doing better  How are your symptoms? (Red Flag symptoms escalate to triage hotline per guidelines): Improved  Do you feel your condition is stable enough to be safe at home until your provider visit?: Yes  Does the patient have their discharge instructions? : Yes  Does the patient have questions regarding their discharge instructions? : No  Were you started on any new medications or were there changes to any of your previous medications? : Yes  Does the patient have all of their medications?: Yes  Do you have questions regarding any of your medications? : No  Do you have all of your needed medical supplies or equipment (DME)?  (i.e. oxygen tank, CPAP, cane, etc.): No - What equipment or supplies are needed?  Discharge follow-up appointment scheduled within 14 calendar days? : Yes  Discharge Follow Up Appointment Date: 09/28/23  Discharge Follow Up Appointment Scheduled with?: Primary Care Provider                  PLAN                                                      Outpatient Plan:      Future Appointments   Date Time Provider Department Center   9/20/2023  8:00 AM Osman, Asila, MD [facility]   9/28/2023  3:20 PM Lussenhop, Lauren, MD [facility]         For any urgent concerns, please contact our 24 hour nurse triage line:  [phone]       DAVID HuntW

## 2023-09-16 LAB
BACTERIA BLD CULT: NO GROWTH
BACTERIA BLD CULT: NO GROWTH

## 2023-09-20 ENCOUNTER — OFFICE VISIT (OUTPATIENT)
Dept: FAMILY MEDICINE | Facility: CLINIC | Age: 28
End: 2023-09-20
Payer: COMMERCIAL

## 2023-09-20 VITALS
BODY MASS INDEX: 36.07 KG/M2 | HEART RATE: 70 BPM | TEMPERATURE: 97 F | WEIGHT: 196 LBS | DIASTOLIC BLOOD PRESSURE: 75 MMHG | OXYGEN SATURATION: 99 % | RESPIRATION RATE: 20 BRPM | SYSTOLIC BLOOD PRESSURE: 117 MMHG | HEIGHT: 62 IN

## 2023-09-20 DIAGNOSIS — B35.6 FUNGAL INFECTION OF THE GROIN: ICD-10-CM

## 2023-09-20 DIAGNOSIS — O24.414 INSULIN CONTROLLED GESTATIONAL DIABETES MELLITUS (GDM) IN THIRD TRIMESTER: ICD-10-CM

## 2023-09-20 DIAGNOSIS — Z09 HOSPITAL DISCHARGE FOLLOW-UP: Primary | ICD-10-CM

## 2023-09-20 PROCEDURE — 99207 PR PRENATAL VISIT: CPT | Mod: GC

## 2023-09-20 PROCEDURE — 99214 OFFICE O/P EST MOD 30 MIN: CPT | Mod: 25

## 2023-09-20 PROCEDURE — 90715 TDAP VACCINE 7 YRS/> IM: CPT

## 2023-09-20 PROCEDURE — 90471 IMMUNIZATION ADMIN: CPT

## 2023-09-20 RX ORDER — PRENATAL VIT 91/IRON/FOLIC/DHA 28-975-200
COMBINATION PACKAGE (EA) ORAL 2 TIMES DAILY
Qty: 15 G | Refills: 3 | Status: SHIPPED | OUTPATIENT
Start: 2023-09-20
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
## 2023-09-20 NOTE — NURSING NOTE
Prior to immunization administration, verified patients identity using patient s name and date of birth. Please see Immunization Activity for additional information.     Screening Questionnaire for Adult Immunization    Are you sick today?   No   Do you have allergies to medications, food, a vaccine component or latex?   No   Have you ever had a serious reaction after receiving a vaccination?   No   Do you have a long-term health problem with heart, lung, kidney, or metabolic disease (e.g., diabetes), asthma, a blood disorder, no spleen, complement component deficiency, a cochlear implant, or a spinal fluid leak?  Are you on long-term aspirin therapy?   No   Do you have cancer, leukemia, HIV/AIDS, or any other immune system problem?   No   Do you have a parent, brother, or sister with an immune system problem?   No   In the past 3 months, have you taken medications that affect  your immune system, such as prednisone, other steroids, or anticancer drugs; drugs for the treatment of rheumatoid arthritis, Crohn s disease, or psoriasis; or have you had radiation treatments?   No   Have you had a seizure, or a brain or other nervous system problem?   No   During the past year, have you received a transfusion of blood or blood    products, or been given immune (gamma) globulin or antiviral drug?   No   For women: Are you pregnant or is there a chance you could become       pregnant during the next month?   No   Have you received any vaccinations in the past 4 weeks?   No     Immunization questionnaire answers were all negative.      Patient instructed to remain in clinic for 15 minutes afterwards, and to report any adverse reactions.     Screening performed by ZACH Marquez on 9/20/2023 at 9:13 AM.

## 2023-09-22 ENCOUNTER — PRE VISIT (OUTPATIENT)
Dept: MATERNAL FETAL MEDICINE | Facility: HOSPITAL | Age: 28
End: 2023-09-22
Payer: COMMERCIAL

## 2023-09-27 ENCOUNTER — OFFICE VISIT (OUTPATIENT)
Dept: MATERNAL FETAL MEDICINE | Facility: HOSPITAL | Age: 28
End: 2023-09-27
Attending: STUDENT IN AN ORGANIZED HEALTH CARE EDUCATION/TRAINING PROGRAM
Payer: COMMERCIAL

## 2023-09-27 ENCOUNTER — ANCILLARY PROCEDURE (OUTPATIENT)
Dept: ULTRASOUND IMAGING | Facility: HOSPITAL | Age: 28
End: 2023-09-27
Attending: STUDENT IN AN ORGANIZED HEALTH CARE EDUCATION/TRAINING PROGRAM
Payer: COMMERCIAL

## 2023-09-27 DIAGNOSIS — O24.414 INSULIN CONTROLLED GESTATIONAL DIABETES MELLITUS (GDM) IN THIRD TRIMESTER: ICD-10-CM

## 2023-09-27 DIAGNOSIS — O24.419 GDM, CLASS A2: Primary | ICD-10-CM

## 2023-09-27 DIAGNOSIS — Z36.89 ENCOUNTER FOR FETAL ANATOMIC SURVEY: ICD-10-CM

## 2023-09-27 PROCEDURE — 76818 FETAL BIOPHYS PROFILE W/NST: CPT | Mod: 26 | Performed by: STUDENT IN AN ORGANIZED HEALTH CARE EDUCATION/TRAINING PROGRAM

## 2023-09-27 PROCEDURE — 99203 OFFICE O/P NEW LOW 30 MIN: CPT | Mod: 25 | Performed by: STUDENT IN AN ORGANIZED HEALTH CARE EDUCATION/TRAINING PROGRAM

## 2023-09-27 PROCEDURE — 76811 OB US DETAILED SNGL FETUS: CPT | Mod: 26 | Performed by: STUDENT IN AN ORGANIZED HEALTH CARE EDUCATION/TRAINING PROGRAM

## 2023-09-27 PROCEDURE — 76811 OB US DETAILED SNGL FETUS: CPT

## 2023-09-27 PROCEDURE — 99207 PR NO CHARGE LOS: CPT | Performed by: STUDENT IN AN ORGANIZED HEALTH CARE EDUCATION/TRAINING PROGRAM

## 2023-09-27 NOTE — NURSING NOTE
Patient reports POSITIVE fetal movement, DENIES pain, DENIES contractions, leaking of fluid, or bleeding.  Reports blood sugar values fasting WNL and 1-2 hr post prandial AT CUT OFF-MAY NEED INCREASE WHEN SHE SEES EDUCATORS TOMORROW.  Education provided to patient on blood sugar values.  SBAR given to MAYRA IBRAHIM, see their note in Epic.

## 2023-09-27 NOTE — PROGRESS NOTES
Please see the full imaging report from the [employer] program under the imaging tab.    Felicita Mckeon MD  Maternal Fetal Medicine

## 2023-09-28 ENCOUNTER — OFFICE VISIT (OUTPATIENT)
Dept: FAMILY MEDICINE | Facility: CLINIC | Age: 28
End: 2023-09-28
Payer: COMMERCIAL

## 2023-09-28 VITALS
WEIGHT: 196 LBS | DIASTOLIC BLOOD PRESSURE: 72 MMHG | SYSTOLIC BLOOD PRESSURE: 115 MMHG | TEMPERATURE: 97.9 F | RESPIRATION RATE: 18 BRPM | HEIGHT: 62 IN | BODY MASS INDEX: 36.07 KG/M2 | OXYGEN SATURATION: 97 % | HEART RATE: 80 BPM

## 2023-09-28 DIAGNOSIS — O24.414 INSULIN CONTROLLED GESTATIONAL DIABETES MELLITUS (GDM) IN THIRD TRIMESTER: Primary | ICD-10-CM

## 2023-09-28 DIAGNOSIS — O24.419 GESTATIONAL DIABETES MELLITUS (GDM) IN SECOND TRIMESTER, GESTATIONAL DIABETES METHOD OF CONTROL UNSPECIFIED: ICD-10-CM

## 2023-09-28 PROCEDURE — 99213 OFFICE O/P EST LOW 20 MIN: CPT | Mod: GC
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
## 2023-09-29 ENCOUNTER — OFFICE VISIT (OUTPATIENT)
Dept: MATERNAL FETAL MEDICINE | Facility: HOSPITAL | Age: 28
End: 2023-09-29
Attending: STUDENT IN AN ORGANIZED HEALTH CARE EDUCATION/TRAINING PROGRAM
Payer: COMMERCIAL

## 2023-09-29 ENCOUNTER — ANCILLARY PROCEDURE (OUTPATIENT)
Dept: ULTRASOUND IMAGING | Facility: HOSPITAL | Age: 28
End: 2023-09-29
Attending: STUDENT IN AN ORGANIZED HEALTH CARE EDUCATION/TRAINING PROGRAM
Payer: COMMERCIAL

## 2023-09-29 DIAGNOSIS — O24.419 GDM, CLASS A2: ICD-10-CM

## 2023-09-29 DIAGNOSIS — O24.419 GDM, CLASS A2: Primary | ICD-10-CM

## 2023-09-29 PROCEDURE — 99207 PR NO CHARGE LOS: CPT | Performed by: OBSTETRICS & GYNECOLOGY

## 2023-09-29 PROCEDURE — 76819 FETAL BIOPHYS PROFIL W/O NST: CPT

## 2023-09-29 PROCEDURE — 76819 FETAL BIOPHYS PROFIL W/O NST: CPT | Mod: 26 | Performed by: OBSTETRICS & GYNECOLOGY

## 2023-09-29 NOTE — NURSING NOTE
Patient reports active fetal movement, denies pain,  contractions, leaking of fluid, or bleeding.  Reports insulin increased to 5units meals and at bedtime.  Patient denies headache, visual changes, nausea/vomiting, epigastric pain related to preeclampsia. SBAR given to MAYRA IBRAHIM, see their note in Epic.

## 2023-09-29 NOTE — PROGRESS NOTES
Please refer to ultrasound report under 'Imaging' Studies of 'Chart Review' tabs.    Miguel Rodriguez M.D.

## 2023-10-04 ENCOUNTER — HOSPITAL ENCOUNTER (OUTPATIENT)
Facility: HOSPITAL | Age: 28
Setting detail: OBSERVATION
Discharge: HOME | End: 2023-10-05
Attending: FAMILY MEDICINE | Admitting: FAMILY MEDICINE
Payer: COMMERCIAL

## 2023-10-04 ENCOUNTER — ANCILLARY PROCEDURE (OUTPATIENT)
Dept: ULTRASOUND IMAGING | Facility: HOSPITAL | Age: 28
End: 2023-10-04
Attending: STUDENT IN AN ORGANIZED HEALTH CARE EDUCATION/TRAINING PROGRAM
Payer: COMMERCIAL

## 2023-10-04 ENCOUNTER — OFFICE VISIT (OUTPATIENT)
Dept: MATERNAL FETAL MEDICINE | Facility: HOSPITAL | Age: 28
End: 2023-10-04
Attending: STUDENT IN AN ORGANIZED HEALTH CARE EDUCATION/TRAINING PROGRAM
Payer: COMMERCIAL

## 2023-10-04 DIAGNOSIS — O24.419 GDM, CLASS A2: Primary | ICD-10-CM

## 2023-10-04 DIAGNOSIS — O24.419 GDM, CLASS A2: ICD-10-CM

## 2023-10-04 PROBLEM — O28.9 ABNORMAL PRENATAL TEST: Status: ACTIVE | Noted: 2023-10-04

## 2023-10-04 LAB
GLUCOSE BLDC GLUCOMTR-MCNC: 106 MG/DL (ref 70–99)
GLUCOSE BLDC GLUCOMTR-MCNC: 74 MG/DL (ref 70–99)

## 2023-10-04 PROCEDURE — 76819 FETAL BIOPHYS PROFIL W/O NST: CPT | Mod: 26 | Performed by: STUDENT IN AN ORGANIZED HEALTH CARE EDUCATION/TRAINING PROGRAM

## 2023-10-04 PROCEDURE — 82962 GLUCOSE BLOOD TEST: CPT

## 2023-10-04 PROCEDURE — 76819 FETAL BIOPHYS PROFIL W/O NST: CPT

## 2023-10-04 PROCEDURE — 99214 OFFICE O/P EST MOD 30 MIN: CPT | Mod: 25 | Performed by: STUDENT IN AN ORGANIZED HEALTH CARE EDUCATION/TRAINING PROGRAM

## 2023-10-04 PROCEDURE — 250N000013 HC RX MED GY IP 250 OP 250 PS 637

## 2023-10-04 PROCEDURE — 250N000012 HC RX MED GY IP 250 OP 636 PS 637

## 2023-10-04 PROCEDURE — G0378 HOSPITAL OBSERVATION PER HR: HCPCS

## 2023-10-04 RX ORDER — DEXTROSE MONOHYDRATE 25 G/50ML
25-50 INJECTION, SOLUTION INTRAVENOUS
Status: DISCONTINUED | OUTPATIENT
Start: 2023-10-04 | End: 2023-10-05 | Stop reason: HOSPADM

## 2023-10-04 RX ORDER — METOCLOPRAMIDE 10 MG/1
10 TABLET ORAL EVERY 6 HOURS PRN
Status: DISCONTINUED | OUTPATIENT
Start: 2023-10-04 | End: 2023-10-05 | Stop reason: HOSPADM

## 2023-10-04 RX ORDER — PROCHLORPERAZINE MALEATE 10 MG
10 TABLET ORAL EVERY 6 HOURS PRN
Status: DISCONTINUED | OUTPATIENT
Start: 2023-10-04 | End: 2023-10-05 | Stop reason: HOSPADM

## 2023-10-04 RX ORDER — NICOTINE POLACRILEX 4 MG
15-30 LOZENGE BUCCAL
Status: DISCONTINUED | OUTPATIENT
Start: 2023-10-04 | End: 2023-10-05 | Stop reason: HOSPADM

## 2023-10-04 RX ORDER — ONDANSETRON 4 MG/1
4 TABLET, ORALLY DISINTEGRATING ORAL EVERY 6 HOURS PRN
Status: DISCONTINUED | OUTPATIENT
Start: 2023-10-04 | End: 2023-10-05 | Stop reason: HOSPADM

## 2023-10-04 RX ORDER — FAMOTIDINE 20 MG/1
20 TABLET, FILM COATED ORAL 2 TIMES DAILY
Status: DISCONTINUED | OUTPATIENT
Start: 2023-10-04 | End: 2023-10-05 | Stop reason: HOSPADM

## 2023-10-04 RX ORDER — PROCHLORPERAZINE 25 MG
25 SUPPOSITORY, RECTAL RECTAL EVERY 12 HOURS PRN
Status: DISCONTINUED | OUTPATIENT
Start: 2023-10-04 | End: 2023-10-05 | Stop reason: HOSPADM

## 2023-10-04 RX ORDER — ACETAMINOPHEN 325 MG/1
650 TABLET ORAL EVERY 4 HOURS PRN
Status: DISCONTINUED | OUTPATIENT
Start: 2023-10-04 | End: 2023-10-05 | Stop reason: HOSPADM

## 2023-10-04 RX ORDER — ONDANSETRON 2 MG/ML
4 INJECTION INTRAMUSCULAR; INTRAVENOUS EVERY 6 HOURS PRN
Status: DISCONTINUED | OUTPATIENT
Start: 2023-10-04 | End: 2023-10-05 | Stop reason: HOSPADM

## 2023-10-04 RX ORDER — HYDROXYZINE HYDROCHLORIDE 50 MG/1
50-100 TABLET, FILM COATED ORAL EVERY 6 HOURS PRN
Status: DISCONTINUED | OUTPATIENT
Start: 2023-10-04 | End: 2023-10-05 | Stop reason: HOSPADM

## 2023-10-04 RX ORDER — PRENATAL VIT/IRON FUM/FOLIC AC 27MG-0.8MG
1 TABLET ORAL DAILY
Status: DISCONTINUED | OUTPATIENT
Start: 2023-10-05 | End: 2023-10-05 | Stop reason: HOSPADM

## 2023-10-04 RX ORDER — METOCLOPRAMIDE HYDROCHLORIDE 5 MG/ML
10 INJECTION INTRAMUSCULAR; INTRAVENOUS EVERY 6 HOURS PRN
Status: DISCONTINUED | OUTPATIENT
Start: 2023-10-04 | End: 2023-10-05 | Stop reason: HOSPADM

## 2023-10-04 RX ADMIN — FAMOTIDINE 20 MG: 20 TABLET ORAL at 21:14

## 2023-10-04 RX ADMIN — INSULIN GLARGINE 8 UNITS: 100 INJECTION, SOLUTION SUBCUTANEOUS at 21:16

## 2023-10-04 ASSESSMENT — ACTIVITIES OF DAILY LIVING (ADL)
ADLS_ACUITY_SCORE: 18
ADLS_ACUITY_SCORE: 18
DRESSING/BATHING_DIFFICULTY: NO
TOILETING_ISSUES: NO
ADLS_ACUITY_SCORE: 35
WEAR_GLASSES_OR_BLIND: NO
ADLS_ACUITY_SCORE: 18
WALKING_OR_CLIMBING_STAIRS_DIFFICULTY: NO
CHANGE_IN_FUNCTIONAL_STATUS_SINCE_ONSET_OF_CURRENT_ILLNESS/INJURY: NO
FALL_HISTORY_WITHIN_LAST_SIX_MONTHS: NO
CONCENTRATING,_REMEMBERING_OR_MAKING_DECISIONS_DIFFICULTY: NO
DOING_ERRANDS_INDEPENDENTLY_DIFFICULTY: NO
DIFFICULTY_EATING/SWALLOWING: NO

## 2023-10-04 NOTE — NURSING NOTE
Rashmi Oliva is a  at 34w5d who presents to [facility] for a BPP for GDM A2. States did not feel well last night, Blood sugar ws 230, fasting this morning 105. Pt reports positive fetal movement. Pt denies bldg/lof/change in discharge, contractions, headache, vision changes, chest pain/SOB or edema. SBAR given to Dr. Mckeon, see note in Epic.      BPP , pt escorted to labor and delivery for monitoring overnight and repeat BPP in the morning if tracing reassuring.      Ghislaine Moffett RN
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
## 2023-10-04 NOTE — PROGRESS NOTES
Pt is a  at 34w5d who arrived to [facility] from [facility] following a routine BPP of 4/8, off for breathing and movement. Pregnancy complicated by IDGDM. Pt placed on EFM and VS taken. Pt oriented to room. Dr. Samuels at bedside to see pt and POC. Pt denied pain, LOF or spotting. FHR tracing category 1 with occasional ctx that pt denies feeling. Per MD pt will be staying overnight for continuous EFM. Pt agrees to plan.

## 2023-10-05 ENCOUNTER — APPOINTMENT (OUTPATIENT)
Dept: ULTRASOUND IMAGING | Facility: HOSPITAL | Age: 28
End: 2023-10-05
Payer: COMMERCIAL

## 2023-10-05 ENCOUNTER — OFFICE VISIT (OUTPATIENT)
Dept: FAMILY MEDICINE | Facility: CLINIC | Age: 28
End: 2023-10-05
Payer: COMMERCIAL

## 2023-10-05 ENCOUNTER — HOSPITAL ENCOUNTER (OUTPATIENT)
Facility: HOSPITAL | Age: 28
Setting detail: OBSERVATION
End: 2023-10-05
Admitting: FAMILY MEDICINE
Payer: COMMERCIAL

## 2023-10-05 VITALS
RESPIRATION RATE: 18 BRPM | DIASTOLIC BLOOD PRESSURE: 60 MMHG | TEMPERATURE: 98.1 F | BODY MASS INDEX: 35.88 KG/M2 | HEIGHT: 62 IN | SYSTOLIC BLOOD PRESSURE: 101 MMHG | WEIGHT: 195 LBS | OXYGEN SATURATION: 96 %

## 2023-10-05 VITALS
DIASTOLIC BLOOD PRESSURE: 76 MMHG | HEART RATE: 88 BPM | RESPIRATION RATE: 16 BRPM | WEIGHT: 195 LBS | OXYGEN SATURATION: 99 % | SYSTOLIC BLOOD PRESSURE: 109 MMHG | BODY MASS INDEX: 35.67 KG/M2

## 2023-10-05 DIAGNOSIS — O24.414 INSULIN CONTROLLED GESTATIONAL DIABETES MELLITUS (GDM) IN THIRD TRIMESTER: ICD-10-CM

## 2023-10-05 PROCEDURE — G0378 HOSPITAL OBSERVATION PER HR: HCPCS

## 2023-10-05 PROCEDURE — 99238 HOSP IP/OBS DSCHRG MGMT 30/<: CPT | Mod: GC | Performed by: FAMILY MEDICINE

## 2023-10-05 PROCEDURE — 76816 OB US FOLLOW-UP PER FETUS: CPT

## 2023-10-05 PROCEDURE — 99207 PR COMPLICATED OB VISIT: CPT | Mod: GC

## 2023-10-05 PROCEDURE — G0463 HOSPITAL OUTPT CLINIC VISIT: HCPCS | Mod: 25

## 2023-10-05 PROCEDURE — 76819 FETAL BIOPHYS PROFIL W/O NST: CPT

## 2023-10-05 ASSESSMENT — ACTIVITIES OF DAILY LIVING (ADL)
ADLS_ACUITY_SCORE: 18
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
## 2023-10-05 NOTE — PLAN OF CARE
Goal Outcome Evaluation:       Patient reason for visit is bpp results 4/8 for breathing & movement. Idgdm. Patient denies pain. Monitored all night with intermittent contractions. Vitals stable overnight. Temp 98.1. resp 18. Pulse 80. /60. Sp02 96%. Ambulates independently.     Pascale Liriano  10/05/2023  6:11 AM

## 2023-10-05 NOTE — DISCHARGE SUMMARY
OB Discharge Summary  Date:  10/5/2023    Name:  Rashmi Oliva  :  1995  MRN:  [id number]    Admission Date:  10/4/2023  Delivery Date:  This patient has no babies on file.  Gestational Age at Delivery:  34w6d  Discharge Date:  10/5/2023    Principal Diagnosis:    Problem List Items Addressed This Visit    None    Other Diagnosis:  Abnormal BPP - 4 on 10/4/23    Conditions complicating Pregnancy: Diabetes:  Gestational    Procedure(s) Performed:  Biophysical profile and fetal heart monitoring     Condition at Discharge:  good    Summary of Hospitalization:  Rashmi Oliva is a 28 year old  female at 34w6d who presented to [facility] on 10/4/23 with BPP of 4 that day. Prenatal course was complicated by GDM. Fetal heart tones monitored overnight and cat I. Repeat BPP in AM was 8.       On the day of discharge:   Patient reports that she is feeling well. No pain. Tolerating a normal diet. Urinating and stooling normally. No emotional concerns.     OB History    Para Term  AB Living   6 3 3 0 2 3   SAB IAB Ectopic Multiple Live Births   1 0 0 0 3      # Outcome Date GA Lbr Krishna/2nd Weight Sex Delivery Anes PTL Lv   6 Current            5 Term 11/15/22 39w2d 01:30 / 00:11 3.5 kg (7 lb 11.5 oz) M Vag-Spont None N IKE      Birth Comments: insulin dependant GDM      Name: Neil Yuen      Apgar1: 8  Apgar5: 9   4 Term 19 39w1d 03:05 / 00:04 3.16 kg (6 lb 15.5 oz) F Vag-Spont None N IKE      Birth Comments: nuchal cord delivered through, 2nd degree laceration,EBL 112ml      Complications: Nuchal cord, single gestation      Name: Stacey Yuen      Apgar1: 8  Apgar5: 9   3 Term 18 39w4d / 00:39 3.657 kg (8 lb 1 oz) M Vag-Spont None N IKE      Birth Comments: no complications noted. Length- 20.25 inches and head-33cm. Perineal laceration-2nd degree, labial laceration. EBL 200ml      Name: Abdoulaye Yuen      Apgar1: 8  Apgar5: 9   2 SAB 05/10/17     SAB      1 AB              "  Obstetric Comments   First pregnancy was a SAB at approximately 12 weeks (May 2017)      Above comment referring to her 2nd preg. First preg was  which ended with an . RLee RN       Examination:  /60 (BP Location: Right arm, Patient Position: Left side, Cuff Size: Adult Regular)   Temp 98.1  F (36.7  C) (Oral)   Resp 18   Ht 1.575 m (5' 2\")   Wt 88.5 kg (195 lb)   LMP 2023   SpO2 96%   BMI 35.67 kg/m     Gen: alert, normal interactions and behaviors  Cor: Appears to be perfusing well  Lungs: No difficulty breathing. Speaking in full sentences without issue.  Ext: No lower extremity edema    Discharge Medications:   Resume home insulin regiment    Discharge Plan:   Follow up with Dr. Luong today in clinic  Continue to have frequent BPPs  Patient Instructions:     Physical activity: normal   Diet: normal   Medication: tylenol, ibuprofen as needed     Noah Samuels MD  [facility]       Precepted patient with Dr. Teto Michel.    "

## 2023-10-05 NOTE — PLAN OF CARE
"Care Plan Progress Note      Name: Rashmi Oliva  : 1995  MRN: [id number]    VS: /60 (BP Location: Right arm, Patient Position: Left side, Cuff Size: Adult Regular)   Temp 98.1  F (36.7  C) (Oral)   Resp 18   Ht 1.575 m (5' 2\")   Wt 88.5 kg (195 lb)   LMP 2023   SpO2 96%   BMI 35.67 kg/m        Problem: Plan of Care - These are the overarching goals to be used throughout the patient stay.    Goal: Readiness for Transition of Care  Outcome: Progressing   VSS, no c/o pain, cat 1 tracing, contractions noted patient denies feeling any tightening or pain.  Blood sugars WNL, plan for BPP this am.     Chitra Sales RN   10/5/2023  5:54 AM             "

## 2023-10-05 NOTE — PROGRESS NOTES
Preceptor Attestation:  Patient's case reviewed and discussed with Harsh Luong MD resident and I evaluated the patient. I agree with written assessment and plan of care.  Supervising Physician:  DAQUAN GALLAGHER MD  [facility]

## 2023-10-05 NOTE — DISCHARGE INSTRUCTIONS
EARLY LABOR DISCHARGE INSTRUCTIONS    You were seen for: Labor Assessment  You had (Test or Medicine): Repeat ultrasound, fetal monitoring    Refer to Any Day Now handout for tips on how to labor at home    WHEN TO CALL YOUR PROVIDER:  If this is your first baby: Your contractions (tightening) are 5 minutes apart, last more than 1 minute, and have been consistently getting stronger for 1 hour or more  If this is your second baby or beyond: Your contractions are less than 10 minutes apart and have been consistently getting stronger for 1 hour or more  Feeling your baby move less than usual  Temperature of 100.4 F (38 C) or higher  New fluid leaking from your vagina  Other signs your provider asked you to look for in your body  Vaginal bleeding (bright red blood)  Swelling in your face or more swelling in your hands or legs  Headaches that don't get better after taking Tylenol (acetaminophen)  Changes in your vision (blurry or seeing spots or stars)  Nausea (sick to your stomach) and vomiting (throwing up)  Heartburn that doesn't go away  Sudden, bad belly pain that is unlike your contractions    IF YOU ARRIVED WITH YOUR WATER ALREADY BROKEN (MEMBRANES RUPTURED):  Avoid placing anything in vagina, including intercourse (sex)  Check your temperature every 3 hours when awake  Call your provider/clinic if:  Your temperature is 100.4 F (38 C) or higher  Your fluid becomes not clear or is smelly  Your baby is moving less than usual  You don't go into labor within 24 hours of your water breaking  You have other concerns      FOLLOW UP:  As scheduled in the clinic

## 2023-10-06 ENCOUNTER — OFFICE VISIT (OUTPATIENT)
Dept: MATERNAL FETAL MEDICINE | Facility: HOSPITAL | Age: 28
End: 2023-10-06
Attending: OBSTETRICS & GYNECOLOGY
Payer: COMMERCIAL

## 2023-10-06 ENCOUNTER — ANCILLARY PROCEDURE (OUTPATIENT)
Dept: ULTRASOUND IMAGING | Facility: HOSPITAL | Age: 28
End: 2023-10-06
Attending: OBSTETRICS & GYNECOLOGY
Payer: COMMERCIAL

## 2023-10-06 DIAGNOSIS — O24.419 GDM, CLASS A2: ICD-10-CM

## 2023-10-06 DIAGNOSIS — O24.414 INSULIN CONTROLLED GESTATIONAL DIABETES MELLITUS (GDM) IN THIRD TRIMESTER: Primary | ICD-10-CM

## 2023-10-06 PROCEDURE — 99207 PR NO CHARGE LOS: CPT | Performed by: OBSTETRICS & GYNECOLOGY

## 2023-10-06 PROCEDURE — 76819 FETAL BIOPHYS PROFIL W/O NST: CPT | Mod: 26 | Performed by: OBSTETRICS & GYNECOLOGY

## 2023-10-06 PROCEDURE — 76819 FETAL BIOPHYS PROFIL W/O NST: CPT

## 2023-10-06 NOTE — PROGRESS NOTES
Please see the imaging tab for details of the ultrasound performed today.    Linda Ojeda MD  Specialist in Maternal-Fetal Medicine

## 2023-10-06 NOTE — NURSING NOTE
Rashmi Oliva is a  at 35w0d who presents to [facility] for scheduled biophysical profile surveillance. Pt reports positive fetal movement. SBAR given to Dr. Ojeda, see note in Epic.

## 2023-10-11 ENCOUNTER — OFFICE VISIT (OUTPATIENT)
Dept: FAMILY MEDICINE | Facility: CLINIC | Age: 28
End: 2023-10-11
Payer: COMMERCIAL

## 2023-10-11 ENCOUNTER — ANCILLARY PROCEDURE (OUTPATIENT)
Dept: ULTRASOUND IMAGING | Facility: HOSPITAL | Age: 28
End: 2023-10-11
Attending: OBSTETRICS & GYNECOLOGY
Payer: COMMERCIAL

## 2023-10-11 ENCOUNTER — OFFICE VISIT (OUTPATIENT)
Dept: MATERNAL FETAL MEDICINE | Facility: HOSPITAL | Age: 28
End: 2023-10-11
Attending: OBSTETRICS & GYNECOLOGY
Payer: COMMERCIAL

## 2023-10-11 VITALS
OXYGEN SATURATION: 99 % | RESPIRATION RATE: 16 BRPM | SYSTOLIC BLOOD PRESSURE: 111 MMHG | WEIGHT: 197.8 LBS | DIASTOLIC BLOOD PRESSURE: 76 MMHG | BODY MASS INDEX: 36.18 KG/M2 | HEART RATE: 95 BPM

## 2023-10-11 DIAGNOSIS — O24.414 INSULIN CONTROLLED GESTATIONAL DIABETES MELLITUS (GDM) IN THIRD TRIMESTER: Primary | ICD-10-CM

## 2023-10-11 DIAGNOSIS — O24.419 GDM, CLASS A2: ICD-10-CM

## 2023-10-11 DIAGNOSIS — Z36.9 VISIT FOR PRENATAL SCREENING: ICD-10-CM

## 2023-10-11 LAB
FASTING STATUS PATIENT QL REPORTED: NORMAL
GLUCOSE SERPL-MCNC: 59 MG/DL

## 2023-10-11 PROCEDURE — 76819 FETAL BIOPHYS PROFIL W/O NST: CPT

## 2023-10-11 PROCEDURE — 99207 PR NO CHARGE LOS: CPT | Performed by: OBSTETRICS & GYNECOLOGY

## 2023-10-11 PROCEDURE — 36415 COLL VENOUS BLD VENIPUNCTURE: CPT

## 2023-10-11 PROCEDURE — 82947 ASSAY GLUCOSE BLOOD QUANT: CPT

## 2023-10-11 PROCEDURE — 87653 STREP B DNA AMP PROBE: CPT

## 2023-10-11 PROCEDURE — 76819 FETAL BIOPHYS PROFIL W/O NST: CPT | Mod: 26 | Performed by: OBSTETRICS & GYNECOLOGY

## 2023-10-11 PROCEDURE — 99207 PR PRENATAL VISIT: CPT | Mod: GC

## 2023-10-11 NOTE — PROGRESS NOTES
Please see full imaging report from [employer] program under imaging tab.    Harish Briceno MD  Maternal Fetal Medicine
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
## 2023-10-12 LAB — GP B STREP DNA SPEC QL NAA+PROBE: NEGATIVE

## 2023-10-13 ENCOUNTER — OFFICE VISIT (OUTPATIENT)
Dept: MATERNAL FETAL MEDICINE | Facility: HOSPITAL | Age: 28
End: 2023-10-13
Attending: STUDENT IN AN ORGANIZED HEALTH CARE EDUCATION/TRAINING PROGRAM
Payer: COMMERCIAL

## 2023-10-13 ENCOUNTER — ANCILLARY PROCEDURE (OUTPATIENT)
Dept: ULTRASOUND IMAGING | Facility: HOSPITAL | Age: 28
End: 2023-10-13
Attending: STUDENT IN AN ORGANIZED HEALTH CARE EDUCATION/TRAINING PROGRAM
Payer: COMMERCIAL

## 2023-10-13 DIAGNOSIS — O24.419 GDM, CLASS A2: ICD-10-CM

## 2023-10-13 DIAGNOSIS — O24.414 INSULIN CONTROLLED GESTATIONAL DIABETES MELLITUS (GDM) IN THIRD TRIMESTER: Primary | ICD-10-CM

## 2023-10-13 PROCEDURE — 76819 FETAL BIOPHYS PROFIL W/O NST: CPT | Mod: 26 | Performed by: STUDENT IN AN ORGANIZED HEALTH CARE EDUCATION/TRAINING PROGRAM

## 2023-10-13 PROCEDURE — 76819 FETAL BIOPHYS PROFIL W/O NST: CPT

## 2023-10-13 PROCEDURE — 99207 PR NO CHARGE LOS: CPT | Performed by: STUDENT IN AN ORGANIZED HEALTH CARE EDUCATION/TRAINING PROGRAM

## 2023-10-13 NOTE — NURSING NOTE
Rashmi Oliva is a  at 36w0d who presents to [facility] for schduled biophysical profile. Pt reports positive fetal movement. SBAR given to Dr. Mckeon, see note in Epic.

## 2023-10-17 ENCOUNTER — ANCILLARY PROCEDURE (OUTPATIENT)
Dept: ULTRASOUND IMAGING | Facility: HOSPITAL | Age: 28
End: 2023-10-17
Attending: STUDENT IN AN ORGANIZED HEALTH CARE EDUCATION/TRAINING PROGRAM
Payer: COMMERCIAL

## 2023-10-17 ENCOUNTER — OFFICE VISIT (OUTPATIENT)
Dept: MATERNAL FETAL MEDICINE | Facility: HOSPITAL | Age: 28
End: 2023-10-17
Attending: STUDENT IN AN ORGANIZED HEALTH CARE EDUCATION/TRAINING PROGRAM
Payer: COMMERCIAL

## 2023-10-17 DIAGNOSIS — O24.419 GDM, CLASS A2: ICD-10-CM

## 2023-10-17 DIAGNOSIS — O24.414 INSULIN CONTROLLED GESTATIONAL DIABETES MELLITUS (GDM) IN THIRD TRIMESTER: Primary | ICD-10-CM

## 2023-10-17 PROCEDURE — 76819 FETAL BIOPHYS PROFIL W/O NST: CPT

## 2023-10-17 PROCEDURE — 76819 FETAL BIOPHYS PROFIL W/O NST: CPT | Mod: 26 | Performed by: STUDENT IN AN ORGANIZED HEALTH CARE EDUCATION/TRAINING PROGRAM

## 2023-10-17 NOTE — NURSING NOTE
Pt at [facility] for BPP- see detailed report under imaging tab.  Pt reports positive fetal movement, denies contractions.  States a small amount of leaking over the weekend but thinks it was likely urine.  Encouraged pt to call PCP and update them, pt states she has an appt on Thursday and will contact them sooner if further concerns.

## 2023-10-17 NOTE — PROGRESS NOTES
"Please see \"Imaging\" tab under \"Chart Review\" for details of today's visit.    Mirela Mckeon    "

## 2023-10-19 ENCOUNTER — OFFICE VISIT (OUTPATIENT)
Dept: FAMILY MEDICINE | Facility: CLINIC | Age: 28
End: 2023-10-19
Payer: COMMERCIAL

## 2023-10-19 VITALS
HEIGHT: 62 IN | OXYGEN SATURATION: 97 % | RESPIRATION RATE: 22 BRPM | WEIGHT: 201 LBS | SYSTOLIC BLOOD PRESSURE: 114 MMHG | HEART RATE: 89 BPM | BODY MASS INDEX: 36.99 KG/M2 | TEMPERATURE: 97.9 F | DIASTOLIC BLOOD PRESSURE: 75 MMHG

## 2023-10-19 DIAGNOSIS — N89.8 VAGINAL DISCHARGE: ICD-10-CM

## 2023-10-19 DIAGNOSIS — O24.414 INSULIN CONTROLLED GESTATIONAL DIABETES MELLITUS (GDM) IN THIRD TRIMESTER: ICD-10-CM

## 2023-10-19 DIAGNOSIS — R87.9 ABNORMAL VAGINAL FLUIDS: Primary | ICD-10-CM

## 2023-10-19 LAB
CLUE CELLS: ABNORMAL
CRYSTALS AMN MICRO: NORMAL
TRICHOMONAS, WET PREP: ABNORMAL
WBC'S/HIGH POWER FIELD, WET PREP: ABNORMAL
YEAST, WET PREP: ABNORMAL

## 2023-10-19 PROCEDURE — 99207 PR COMPLICATED OB VISIT: CPT | Mod: GC

## 2023-10-19 PROCEDURE — 87210 SMEAR WET MOUNT SALINE/INK: CPT

## 2023-10-19 NOTE — PROGRESS NOTES
Preceptor Attestation:   Patient seen, evaluated and discussed with the resident Dr. Harsh Luong. I have verified the content of the note, which accurately reflects my assessment of the patient and the plan of care.    Supervising Physician:  Benjamin Rosenstein, MD, MA  [facility] [place] [facility]  [facility]
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
## 2023-10-20 ENCOUNTER — OFFICE VISIT (OUTPATIENT)
Dept: MATERNAL FETAL MEDICINE | Facility: HOSPITAL | Age: 28
End: 2023-10-20
Attending: STUDENT IN AN ORGANIZED HEALTH CARE EDUCATION/TRAINING PROGRAM
Payer: COMMERCIAL

## 2023-10-20 ENCOUNTER — ANCILLARY PROCEDURE (OUTPATIENT)
Dept: ULTRASOUND IMAGING | Facility: HOSPITAL | Age: 28
End: 2023-10-20
Attending: STUDENT IN AN ORGANIZED HEALTH CARE EDUCATION/TRAINING PROGRAM
Payer: COMMERCIAL

## 2023-10-20 DIAGNOSIS — O24.419 GDM, CLASS A2: ICD-10-CM

## 2023-10-20 DIAGNOSIS — O24.414 INSULIN CONTROLLED GESTATIONAL DIABETES MELLITUS (GDM) IN THIRD TRIMESTER: Primary | ICD-10-CM

## 2023-10-20 PROCEDURE — 76819 FETAL BIOPHYS PROFIL W/O NST: CPT

## 2023-10-20 PROCEDURE — 99207 PR NO CHARGE LOS: CPT | Performed by: STUDENT IN AN ORGANIZED HEALTH CARE EDUCATION/TRAINING PROGRAM

## 2023-10-20 PROCEDURE — 76819 FETAL BIOPHYS PROFIL W/O NST: CPT | Mod: 26 | Performed by: STUDENT IN AN ORGANIZED HEALTH CARE EDUCATION/TRAINING PROGRAM

## 2023-10-23 ENCOUNTER — ANCILLARY PROCEDURE (OUTPATIENT)
Dept: ULTRASOUND IMAGING | Facility: HOSPITAL | Age: 28
End: 2023-10-23
Attending: STUDENT IN AN ORGANIZED HEALTH CARE EDUCATION/TRAINING PROGRAM
Payer: COMMERCIAL

## 2023-10-23 ENCOUNTER — OFFICE VISIT (OUTPATIENT)
Dept: MATERNAL FETAL MEDICINE | Facility: HOSPITAL | Age: 28
End: 2023-10-23
Attending: STUDENT IN AN ORGANIZED HEALTH CARE EDUCATION/TRAINING PROGRAM
Payer: COMMERCIAL

## 2023-10-23 DIAGNOSIS — O24.419 GDM, CLASS A2: ICD-10-CM

## 2023-10-23 DIAGNOSIS — O24.414 INSULIN CONTROLLED GESTATIONAL DIABETES MELLITUS (GDM) IN THIRD TRIMESTER: Primary | ICD-10-CM

## 2023-10-23 PROCEDURE — 76819 FETAL BIOPHYS PROFIL W/O NST: CPT

## 2023-10-23 PROCEDURE — 76816 OB US FOLLOW-UP PER FETUS: CPT

## 2023-10-23 PROCEDURE — 76819 FETAL BIOPHYS PROFIL W/O NST: CPT | Mod: 26 | Performed by: OBSTETRICS & GYNECOLOGY

## 2023-10-23 PROCEDURE — 99207 PR NO CHARGE LOS: CPT | Performed by: OBSTETRICS & GYNECOLOGY

## 2023-10-23 PROCEDURE — 76816 OB US FOLLOW-UP PER FETUS: CPT | Mod: 26 | Performed by: OBSTETRICS & GYNECOLOGY

## 2023-10-23 NOTE — NURSING NOTE
"Rashmi Oliva is a  at 37w3d who presents to [facility] for a follow-up growth ultrasound and BPP. States  blood sugars are \"good\". States increased NPH to 8 units at HS, started taking 5 units with breakfast and increased to 10 units with lunch and dinner.  Pt reports positive fetal movement. Pt denies bldg/lof/change in discharge, contractions, headache, vision changes, chest pain/SOB or edema. SBAR given to Dr. Ojeda, see note in Epic.    "

## 2023-10-25 ENCOUNTER — ANCILLARY PROCEDURE (OUTPATIENT)
Dept: ULTRASOUND IMAGING | Facility: HOSPITAL | Age: 28
End: 2023-10-25
Attending: STUDENT IN AN ORGANIZED HEALTH CARE EDUCATION/TRAINING PROGRAM
Payer: COMMERCIAL

## 2023-10-25 ENCOUNTER — OFFICE VISIT (OUTPATIENT)
Dept: MATERNAL FETAL MEDICINE | Facility: HOSPITAL | Age: 28
End: 2023-10-25
Attending: STUDENT IN AN ORGANIZED HEALTH CARE EDUCATION/TRAINING PROGRAM
Payer: COMMERCIAL

## 2023-10-25 DIAGNOSIS — O24.414 INSULIN CONTROLLED GESTATIONAL DIABETES MELLITUS (GDM) IN THIRD TRIMESTER: ICD-10-CM

## 2023-10-25 DIAGNOSIS — O24.414 INSULIN CONTROLLED GESTATIONAL DIABETES MELLITUS (GDM) IN THIRD TRIMESTER: Primary | ICD-10-CM

## 2023-10-25 PROCEDURE — 99207 PR NO CHARGE LOS: CPT | Performed by: STUDENT IN AN ORGANIZED HEALTH CARE EDUCATION/TRAINING PROGRAM

## 2023-10-25 PROCEDURE — 76819 FETAL BIOPHYS PROFIL W/O NST: CPT | Mod: 26 | Performed by: STUDENT IN AN ORGANIZED HEALTH CARE EDUCATION/TRAINING PROGRAM

## 2023-10-25 PROCEDURE — 76819 FETAL BIOPHYS PROFIL W/O NST: CPT

## 2023-10-25 NOTE — NURSING NOTE
Rashmi Oliva is a  at 37w5d who presents to [facility] for BPP. Pt reports positive fetal movement. Pt denies bldg/lof/change in discharge, contractions, headache, vision changes, chest pain/SOB or edema. SBAR given to Dr. Mckeon, see note in Epic.

## 2023-10-26 ENCOUNTER — OFFICE VISIT (OUTPATIENT)
Dept: FAMILY MEDICINE | Facility: CLINIC | Age: 28
End: 2023-10-26
Payer: COMMERCIAL

## 2023-10-26 ENCOUNTER — HOSPITAL ENCOUNTER (OUTPATIENT)
Facility: HOSPITAL | Age: 28
End: 2023-10-26
Admitting: FAMILY MEDICINE
Payer: COMMERCIAL

## 2023-10-26 ENCOUNTER — HOSPITAL ENCOUNTER (OUTPATIENT)
Facility: HOSPITAL | Age: 28
Discharge: HOME | End: 2023-10-26
Attending: FAMILY MEDICINE | Admitting: FAMILY MEDICINE
Payer: COMMERCIAL

## 2023-10-26 VITALS
TEMPERATURE: 98.4 F | SYSTOLIC BLOOD PRESSURE: 116 MMHG | BODY MASS INDEX: 36.62 KG/M2 | WEIGHT: 199 LBS | OXYGEN SATURATION: 98 % | DIASTOLIC BLOOD PRESSURE: 73 MMHG | HEIGHT: 62 IN | HEART RATE: 75 BPM | RESPIRATION RATE: 19 BRPM

## 2023-10-26 VITALS
OXYGEN SATURATION: 98 % | BODY MASS INDEX: 36.62 KG/M2 | DIASTOLIC BLOOD PRESSURE: 72 MMHG | WEIGHT: 199 LBS | RESPIRATION RATE: 18 BRPM | HEART RATE: 67 BPM | HEIGHT: 62 IN | TEMPERATURE: 98.7 F | SYSTOLIC BLOOD PRESSURE: 118 MMHG

## 2023-10-26 DIAGNOSIS — Z34.83 ENCOUNTER FOR SUPERVISION OF OTHER NORMAL PREGNANCY IN THIRD TRIMESTER: ICD-10-CM

## 2023-10-26 DIAGNOSIS — O24.414 INSULIN CONTROLLED GESTATIONAL DIABETES MELLITUS (GDM) IN THIRD TRIMESTER: ICD-10-CM

## 2023-10-26 DIAGNOSIS — O36.8130 DECREASED FETAL MOVEMENTS IN THIRD TRIMESTER, SINGLE OR UNSPECIFIED FETUS: Primary | ICD-10-CM

## 2023-10-26 PROBLEM — Z36.89 ENCOUNTER FOR TRIAGE IN PREGNANT PATIENT: Status: ACTIVE | Noted: 2023-10-26

## 2023-10-26 PROCEDURE — 59025 FETAL NON-STRESS TEST: CPT | Mod: GC

## 2023-10-26 PROCEDURE — 99207 PR COMPLICATED OB VISIT: CPT | Mod: GC

## 2023-10-26 PROCEDURE — G0463 HOSPITAL OUTPT CLINIC VISIT: HCPCS

## 2023-10-26 RX ORDER — LIDOCAINE 40 MG/G
CREAM TOPICAL
Status: DISCONTINUED | OUTPATIENT
Start: 2023-10-26 | End: 2023-10-27 | Stop reason: HOSPADM
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
## 2023-10-27 NOTE — PROGRESS NOTES
OBSTETRICS TRIAGE ASSESSMENT NOTE    Rashmi Oliva is a 28 year old  at 37w6d gestation who has presented to maternity care for further evaluation of decreased fetal movement. No bleeding, leakage of fluids, contractions.     She was seen in clinic today for decreased fetal movement and had a non-reactive NST.   Her PCP recommended that she come straight to L&D for further monitoring.   Patient went home and took a nap and ate dinner prior to coming into hospital.   Endorses more fetal movement this evening than earlier in the day.   Had an 8/8 BPP yesterday.   Prenatal course complicated by GDMA2, on insulin.     PRENATAL CARE  Seen by Dr. Luong at [facility].         PAST MEDICAL HISTORY  Past Medical History:   Diagnosis Date    Chlamydia     Gestational diabetes     Insulin controlled gestational diabetes mellitus (GDM) during pregnancy 2019    Wounds and injuries        PAST SURGICAL HISTORY   Past Surgical History:   Procedure Laterality Date    SURGICAL PATHOLOGY EXAM         MEDICATIONS    Current Facility-Administered Medications:     lidocaine (LMX4) cream, , Topical, Q1H PRN, Mayra Bonner MD    lidocaine 1 % 0.1-1 mL, 0.1-1 mL, Other, Q1H PRN, Mayra Bonner MD    sodium chloride (PF) 0.9% PF flush 3 mL, 3 mL, Intracatheter, Q8H, Mayra Bonner MD    sodium chloride (PF) 0.9% PF flush 3 mL, 3 mL, Intracatheter, q1 min prn, Mayra Bonner MD    SOCIAL HISTORY:   Social History     Socioeconomic History    Marital status: Single     Spouse name: Bonilla Yuen    Number of children: 0    Years of education: 12    Highest education level: Not on file   Occupational History    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 9/6/2017 Ramiro RN     Social Determinants of Health     Financial Resource Strain: Low Risk  (9/28/2023)    Financial Resource Strain     Within the past 12 months, have you or your family members you live with been unable to get utilities (heat, electricity) when it was really needed?: No   Food Insecurity: Low Risk  (9/28/2023)    Food Insecurity     Within the past 12 months, did you worry that your food would run out before you got money to buy more?: No     Within the past 12 months, did the food you bought just not last and you didn t have money to get more?: No   Transportation Needs: Low Risk  (9/28/2023)    Transportation Needs     Within the past 12 months, has lack of transportation kept you from medical appointments, getting your medicines, non-medical meetings or appointments, work, or from getting things that you need?: No   Physical Activity: Not on file   Stress: Not on file   Social Connections: Not on file   Interpersonal Safety: Low Risk  (10/26/2023)    Interpersonal Safety     Do you feel physically and emotionally safe where you currently live?: Yes     Within the past 12 months, have you been hit, slapped, kicked or otherwise physically hurt by someone?: No     Within the past 12 months, have you been humiliated or emotionally abused in other ways by your partner or ex-partner?: No   Housing Stability: Low Risk  (9/28/2023)    Housing Stability     Do you have housing? : Yes     Are you worried about losing your housing?: No       PHYSICAL EXAMINATION   /72 (BP Location: Right arm, Patient Position: Semi-Tavera's, Cuff Size: Adult Regular)   Pulse  "67   Temp 98.7  F (37.1  C) (Oral)   Resp 18   Ht 1.575 m (5' 2\")   Wt 90.3 kg (199 lb)   LMP 02/03/2023   SpO2 98%   BMI 36.40 kg/m    Gen: appears comfortable, no acute distress  CV: regular rate and rhythm, no murmur appreciated  Lungs: clear to ausculation, good air movement throughout  Abdomen: Gravid, non-tender fundus  Extremities: no lower extremity edema  FHT: Category 1 tracing;, reactive NST  Contractions: one contraction    LAB RESULTS  Personally reviewed.  No results found for this or any previous visit (from the past 24 hour(s)).    ASSESSMENT:  Rashmi Oliva is a 28 year old year old at 37w6d weeks gestation who presents for evaluation of decreased fetal movement. Was seen in clinic for this earlier today and had non-reactive NST, so her PCP recommended she go to L&D. This evening, patient had a reactive NST and was feeling baby move. She had an 8/8 BPP yesterday. No further work-up needed at this time.     La Nena Suarez MD  [facility]   Pager #: [phone]      Precepted patient with Dr. Benítez  who agrees with the plan above.    "

## 2023-10-27 NOTE — PROGRESS NOTES
Patient denies feeling contractions.     Strip discussed with Dr and discharge orders obtained for discharge.  Resident at bedside to see patient now.  Strip reviewed.  Category 1 Tracing obtained.      Discharged home with follow up as scheduled in the clinic next week.

## 2023-10-27 NOTE — DISCHARGE INSTRUCTIONS
Discharge Instruction for Undelivered Patients      You were seen for: Fetal Assessment  We Consulted: Dr Suarez   You had (Test or Medicine): Fetal Monitoring    Diet:   Drink 8 to 12 glasses of liquids (milk, juice, water) every day.  You may eat meals and snacks.     Activity:  Count fetal kicks everyday (see handout)  Call your doctor or nurse midwife if your baby is moving less than usual.     Call your provider if you notice:  Swelling in your face or increased swelling in your hands or legs.  Headaches that are not relieved by Tylenol (acetaminophen).  Changes in your vision (blurring: seeing spots or stars.)  Nausea (sick to your stomach) and vomiting (throwing up).   Weight gain of 5 pounds or more per week.  Heartburn that doesn't go away.  Signs of bladder infection: pain when you urinate (use the toilet), need to go more often and more urgently.  The bag of zafar (rupture of membranes) breaks, or you notice leaking in your underwear.  Bright red blood in your underwear.  Abdominal (lower belly) or stomach pain.  For first baby: Contractions (tightening) less than 5 minutes apart for one hour or more.  Second (plus) baby: Contractions (tightening) less than 10 minutes apart and getting stronger.  *If less than 34 weeks: Contractions (tightening) more than 6 times in one hour.  Increase or change in vaginal discharge (note the color and amount)      Follow-up:  As scheduled in the clinic

## 2023-10-27 NOTE — PROGRESS NOTES
Rashmi,  here for EFM after being seen earlier in the clinic today where she reported decreased fetal movement today and reports to this RN that there were no accelerations.  Patient states the doctor encouraged her to come to the hospital for further monitoring, but she went home first to eat and ended up taking a nap.  She presents here now and reports she feels fetal movement but that is it lighter than what she had felt previously.    Placed on EFM.  Category 1 tracing, Reactive NST obtained.  Page to Dr Luong.  Page to Resident.  Awaiting to review care for discharge orders.  Patient was at [facility] yesterday due to GDM on Insulin, and had BPP of 8/8.

## 2023-10-30 ENCOUNTER — OFFICE VISIT (OUTPATIENT)
Dept: MATERNAL FETAL MEDICINE | Facility: HOSPITAL | Age: 28
End: 2023-10-30
Attending: OBSTETRICS & GYNECOLOGY
Payer: COMMERCIAL

## 2023-10-30 ENCOUNTER — ANCILLARY PROCEDURE (OUTPATIENT)
Dept: ULTRASOUND IMAGING | Facility: HOSPITAL | Age: 28
End: 2023-10-30
Attending: OBSTETRICS & GYNECOLOGY
Payer: COMMERCIAL

## 2023-10-30 DIAGNOSIS — O24.414 INSULIN CONTROLLED GESTATIONAL DIABETES MELLITUS (GDM) IN THIRD TRIMESTER: ICD-10-CM

## 2023-10-30 DIAGNOSIS — O24.414 INSULIN CONTROLLED GESTATIONAL DIABETES MELLITUS (GDM) IN THIRD TRIMESTER: Primary | ICD-10-CM

## 2023-10-30 PROCEDURE — 76819 FETAL BIOPHYS PROFIL W/O NST: CPT | Mod: 26 | Performed by: OBSTETRICS & GYNECOLOGY

## 2023-10-30 PROCEDURE — 76819 FETAL BIOPHYS PROFIL W/O NST: CPT

## 2023-10-30 PROCEDURE — 99207 PR NO CHARGE LOS: CPT | Performed by: OBSTETRICS & GYNECOLOGY

## 2023-10-30 NOTE — PROGRESS NOTES
Faculty Supervision of Residents   I have examined this patient and the medical care has been evaluated and discussed with the resident. See resident note outlining our discussion.      Ltey Desai MD

## 2023-10-30 NOTE — PROGRESS NOTES
"Please see \"Imaging\" tab under \"Chart Review\" for details of today's visit.    Pauline Schwartz    "

## 2023-10-30 NOTE — NURSING NOTE
Rashmi Pj is a  at 38w3d who presents to [facility] for SCHEDULED BPP. Pt reports positive fetal movement. SBAR given to Dr. Schwartz, see note in Epic.

## 2023-11-01 ENCOUNTER — OFFICE VISIT (OUTPATIENT)
Dept: FAMILY MEDICINE | Facility: CLINIC | Age: 28
End: 2023-11-01
Payer: COMMERCIAL

## 2023-11-01 VITALS
OXYGEN SATURATION: 97 % | HEIGHT: 62 IN | TEMPERATURE: 98.5 F | BODY MASS INDEX: 36.44 KG/M2 | HEART RATE: 69 BPM | DIASTOLIC BLOOD PRESSURE: 78 MMHG | WEIGHT: 198 LBS | SYSTOLIC BLOOD PRESSURE: 116 MMHG | RESPIRATION RATE: 22 BRPM

## 2023-11-01 DIAGNOSIS — O24.414 INSULIN CONTROLLED GESTATIONAL DIABETES MELLITUS (GDM) IN THIRD TRIMESTER: ICD-10-CM

## 2023-11-01 DIAGNOSIS — N89.8 VAGINAL DISCHARGE: Primary | ICD-10-CM

## 2023-11-01 DIAGNOSIS — Z34.83 ENCOUNTER FOR SUPERVISION OF OTHER NORMAL PREGNANCY IN THIRD TRIMESTER: ICD-10-CM

## 2023-11-01 LAB
CLUE CELLS: ABNORMAL
TRICHOMONAS, WET PREP: ABNORMAL
WBC'S/HIGH POWER FIELD, WET PREP: ABNORMAL
YEAST, WET PREP: ABNORMAL

## 2023-11-01 PROCEDURE — 87210 SMEAR WET MOUNT SALINE/INK: CPT

## 2023-11-01 PROCEDURE — 99207 PR PRENATAL VISIT: CPT | Mod: GC
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
## 2023-11-02 ENCOUNTER — ANCILLARY PROCEDURE (OUTPATIENT)
Dept: ULTRASOUND IMAGING | Facility: HOSPITAL | Age: 28
End: 2023-11-02
Attending: OBSTETRICS & GYNECOLOGY
Payer: COMMERCIAL

## 2023-11-02 ENCOUNTER — OFFICE VISIT (OUTPATIENT)
Dept: MATERNAL FETAL MEDICINE | Facility: HOSPITAL | Age: 28
End: 2023-11-02
Attending: OBSTETRICS & GYNECOLOGY
Payer: COMMERCIAL

## 2023-11-02 DIAGNOSIS — O24.414 INSULIN CONTROLLED GESTATIONAL DIABETES MELLITUS (GDM) IN THIRD TRIMESTER: ICD-10-CM

## 2023-11-02 DIAGNOSIS — O24.414 INSULIN CONTROLLED GESTATIONAL DIABETES MELLITUS (GDM) IN THIRD TRIMESTER: Primary | ICD-10-CM

## 2023-11-02 PROCEDURE — 99207 PR NO CHARGE LOS: CPT | Performed by: OBSTETRICS & GYNECOLOGY

## 2023-11-02 PROCEDURE — 76819 FETAL BIOPHYS PROFIL W/O NST: CPT

## 2023-11-02 PROCEDURE — 76819 FETAL BIOPHYS PROFIL W/O NST: CPT | Mod: 26 | Performed by: OBSTETRICS & GYNECOLOGY

## 2023-11-02 NOTE — NURSING NOTE
"Patient presents to [facility] at 38w6d. Patient reports positive fetal movement, denies pain, denies painful contractions, leaking of fluid, or bleeding.  Reports blood sugar values \"have been alright. My doctor prescribed me 12 units of insulin now\" with meals. Postprandial blood sugars have been in 130-140 range. Patient denies SOB/chest pain, edema, headache, visual changes, nausea/vomiting, epigastric pain related to preeclampsia. She is scheduled for IOL tomorrow, 11/3/23 at the [facility]. SBAR given to [facility], see their note in Epic.    Lucian Hawkins RN 11/2/2023 8:14 AM                "

## 2023-11-03 ENCOUNTER — HOSPITAL ENCOUNTER (INPATIENT)
Facility: HOSPITAL | Age: 28
LOS: 2 days | Discharge: HOME | End: 2023-11-05
Attending: FAMILY MEDICINE | Admitting: FAMILY MEDICINE
Payer: COMMERCIAL

## 2023-11-03 LAB
ABO/RH(D): NORMAL
ANTIBODY SCREEN: NEGATIVE
B-OH-BUTYR SERPL-SCNC: <0.18 MMOL/L
GLUCOSE BLDC GLUCOMTR-MCNC: 102 MG/DL (ref 70–99)
GLUCOSE BLDC GLUCOMTR-MCNC: 109 MG/DL (ref 70–99)
GLUCOSE BLDC GLUCOMTR-MCNC: 162 MG/DL (ref 70–99)
GLUCOSE BLDC GLUCOMTR-MCNC: 89 MG/DL (ref 70–99)
GLUCOSE BLDC GLUCOMTR-MCNC: 95 MG/DL (ref 70–99)
HGB BLD-MCNC: 11.6 G/DL (ref 11.7–15.7)
HOLD SPECIMEN: NORMAL
SPECIMEN EXPIRATION DATE: NORMAL
T PALLIDUM AB SER QL: NONREACTIVE

## 2023-11-03 PROCEDURE — 36415 COLL VENOUS BLD VENIPUNCTURE: CPT

## 2023-11-03 PROCEDURE — 85018 HEMOGLOBIN: CPT

## 2023-11-03 PROCEDURE — 250N000011 HC RX IP 250 OP 636: Mod: JZ

## 2023-11-03 PROCEDURE — 250N000013 HC RX MED GY IP 250 OP 250 PS 637

## 2023-11-03 PROCEDURE — 86901 BLOOD TYPING SEROLOGIC RH(D): CPT

## 2023-11-03 PROCEDURE — 0HQ9XZZ REPAIR PERINEUM SKIN, EXTERNAL APPROACH: ICD-10-PCS

## 2023-11-03 PROCEDURE — 120N000001 HC R&B MED SURG/OB

## 2023-11-03 PROCEDURE — 86780 TREPONEMA PALLIDUM: CPT

## 2023-11-03 PROCEDURE — 82010 KETONE BODYS QUAN: CPT

## 2023-11-03 PROCEDURE — 250N000009 HC RX 250

## 2023-11-03 PROCEDURE — 3E033VJ INTRODUCTION OF OTHER HORMONE INTO PERIPHERAL VEIN, PERCUTANEOUS APPROACH: ICD-10-PCS

## 2023-11-03 PROCEDURE — 59400 OBSTETRICAL CARE: CPT | Mod: GC

## 2023-11-03 PROCEDURE — 722N000001 HC LABOR CARE VAGINAL DELIVERY SINGLE

## 2023-11-03 PROCEDURE — 258N000003 HC RX IP 258 OP 636

## 2023-11-03 RX ORDER — IBUPROFEN 800 MG/1
800 TABLET, FILM COATED ORAL EVERY 6 HOURS PRN
Status: DISCONTINUED | OUTPATIENT
Start: 2023-11-03 | End: 2023-11-05 | Stop reason: HOSPADM

## 2023-11-03 RX ORDER — SODIUM CHLORIDE 9 MG/ML
INJECTION, SOLUTION INTRAVENOUS CONTINUOUS
Status: DISCONTINUED | OUTPATIENT
Start: 2023-11-03 | End: 2023-11-03 | Stop reason: HOSPADM

## 2023-11-03 RX ORDER — SODIUM CHLORIDE, SODIUM LACTATE, POTASSIUM CHLORIDE, CALCIUM CHLORIDE 600; 310; 30; 20 MG/100ML; MG/100ML; MG/100ML; MG/100ML
INJECTION, SOLUTION INTRAVENOUS CONTINUOUS PRN
Status: DISCONTINUED | OUTPATIENT
Start: 2023-11-03 | End: 2023-11-03 | Stop reason: HOSPADM

## 2023-11-03 RX ORDER — DEXTROSE MONOHYDRATE 25 G/50ML
25-50 INJECTION, SOLUTION INTRAVENOUS
Status: DISCONTINUED | OUTPATIENT
Start: 2023-11-03 | End: 2023-11-03

## 2023-11-03 RX ORDER — OXYTOCIN/0.9 % SODIUM CHLORIDE 30/500 ML
1-24 PLASTIC BAG, INJECTION (ML) INTRAVENOUS CONTINUOUS
Status: DISCONTINUED | OUTPATIENT
Start: 2023-11-03 | End: 2023-11-03

## 2023-11-03 RX ORDER — DEXTROSE MONOHYDRATE 25 G/50ML
25-50 INJECTION, SOLUTION INTRAVENOUS
Status: DISCONTINUED | OUTPATIENT
Start: 2023-11-03 | End: 2023-11-05 | Stop reason: HOSPADM

## 2023-11-03 RX ORDER — OXYTOCIN/0.9 % SODIUM CHLORIDE 30/500 ML
100-340 PLASTIC BAG, INJECTION (ML) INTRAVENOUS CONTINUOUS PRN
Status: DISCONTINUED | OUTPATIENT
Start: 2023-11-03 | End: 2023-11-03

## 2023-11-03 RX ORDER — ACETAMINOPHEN 325 MG/1
650 TABLET ORAL EVERY 4 HOURS PRN
Status: DISCONTINUED | OUTPATIENT
Start: 2023-11-03 | End: 2023-11-05 | Stop reason: HOSPADM

## 2023-11-03 RX ORDER — MODIFIED LANOLIN
OINTMENT (GRAM) TOPICAL
Status: DISCONTINUED | OUTPATIENT
Start: 2023-11-03 | End: 2023-11-05 | Stop reason: HOSPADM

## 2023-11-03 RX ORDER — NICOTINE POLACRILEX 4 MG
15-30 LOZENGE BUCCAL
Status: DISCONTINUED | OUTPATIENT
Start: 2023-11-03 | End: 2023-11-05 | Stop reason: HOSPADM

## 2023-11-03 RX ORDER — CARBOPROST TROMETHAMINE 250 UG/ML
250 INJECTION, SOLUTION INTRAMUSCULAR
Status: DISCONTINUED | OUTPATIENT
Start: 2023-11-03 | End: 2023-11-03 | Stop reason: HOSPADM

## 2023-11-03 RX ORDER — KETOROLAC TROMETHAMINE 30 MG/ML
30 INJECTION, SOLUTION INTRAMUSCULAR; INTRAVENOUS
Status: DISCONTINUED | OUTPATIENT
Start: 2023-11-03 | End: 2023-11-05 | Stop reason: HOSPADM

## 2023-11-03 RX ORDER — BISACODYL 10 MG
10 SUPPOSITORY, RECTAL RECTAL DAILY PRN
Status: DISCONTINUED | OUTPATIENT
Start: 2023-11-03 | End: 2023-11-05 | Stop reason: HOSPADM

## 2023-11-03 RX ORDER — IBUPROFEN 800 MG/1
800 TABLET, FILM COATED ORAL
Status: DISCONTINUED | OUTPATIENT
Start: 2023-11-03 | End: 2023-11-05 | Stop reason: HOSPADM

## 2023-11-03 RX ORDER — NALOXONE HYDROCHLORIDE 0.4 MG/ML
0.2 INJECTION, SOLUTION INTRAMUSCULAR; INTRAVENOUS; SUBCUTANEOUS
Status: DISCONTINUED | OUTPATIENT
Start: 2023-11-03 | End: 2023-11-03 | Stop reason: HOSPADM

## 2023-11-03 RX ORDER — METOCLOPRAMIDE 10 MG/1
10 TABLET ORAL EVERY 6 HOURS PRN
Status: DISCONTINUED | OUTPATIENT
Start: 2023-11-03 | End: 2023-11-03 | Stop reason: HOSPADM

## 2023-11-03 RX ORDER — LIDOCAINE 40 MG/G
CREAM TOPICAL
Status: DISCONTINUED | OUTPATIENT
Start: 2023-11-03 | End: 2023-11-03 | Stop reason: HOSPADM

## 2023-11-03 RX ORDER — ONDANSETRON 4 MG/1
4 TABLET, ORALLY DISINTEGRATING ORAL EVERY 6 HOURS PRN
Status: DISCONTINUED | OUTPATIENT
Start: 2023-11-03 | End: 2023-11-03 | Stop reason: HOSPADM

## 2023-11-03 RX ORDER — OXYTOCIN/0.9 % SODIUM CHLORIDE 30/500 ML
100-340 PLASTIC BAG, INJECTION (ML) INTRAVENOUS CONTINUOUS PRN
Status: DISCONTINUED | OUTPATIENT
Start: 2023-11-03 | End: 2023-11-05 | Stop reason: HOSPADM

## 2023-11-03 RX ORDER — OXYTOCIN 10 [USP'U]/ML
10 INJECTION, SOLUTION INTRAMUSCULAR; INTRAVENOUS
Status: DISCONTINUED | OUTPATIENT
Start: 2023-11-03 | End: 2023-11-05 | Stop reason: HOSPADM

## 2023-11-03 RX ORDER — NALOXONE HYDROCHLORIDE 0.4 MG/ML
0.4 INJECTION, SOLUTION INTRAMUSCULAR; INTRAVENOUS; SUBCUTANEOUS
Status: DISCONTINUED | OUTPATIENT
Start: 2023-11-03 | End: 2023-11-03 | Stop reason: HOSPADM

## 2023-11-03 RX ORDER — MISOPROSTOL 200 UG/1
800 TABLET ORAL
Status: DISCONTINUED | OUTPATIENT
Start: 2023-11-03 | End: 2023-11-03 | Stop reason: HOSPADM

## 2023-11-03 RX ORDER — DOCUSATE SODIUM 100 MG/1
100 CAPSULE, LIQUID FILLED ORAL DAILY
Status: DISCONTINUED | OUTPATIENT
Start: 2023-11-03 | End: 2023-11-05 | Stop reason: HOSPADM

## 2023-11-03 RX ORDER — CITRIC ACID/SODIUM CITRATE 334-500MG
30 SOLUTION, ORAL ORAL
Status: DISCONTINUED | OUTPATIENT
Start: 2023-11-03 | End: 2023-11-03 | Stop reason: HOSPADM

## 2023-11-03 RX ORDER — HYDROCORTISONE 25 MG/G
CREAM TOPICAL 3 TIMES DAILY PRN
Status: DISCONTINUED | OUTPATIENT
Start: 2023-11-03 | End: 2023-11-05 | Stop reason: HOSPADM

## 2023-11-03 RX ORDER — OXYTOCIN 10 [USP'U]/ML
10 INJECTION, SOLUTION INTRAMUSCULAR; INTRAVENOUS
Status: DISCONTINUED | OUTPATIENT
Start: 2023-11-03 | End: 2023-11-03

## 2023-11-03 RX ORDER — METOCLOPRAMIDE HYDROCHLORIDE 5 MG/ML
10 INJECTION INTRAMUSCULAR; INTRAVENOUS EVERY 6 HOURS PRN
Status: DISCONTINUED | OUTPATIENT
Start: 2023-11-03 | End: 2023-11-03 | Stop reason: HOSPADM

## 2023-11-03 RX ORDER — MISOPROSTOL 200 UG/1
800 TABLET ORAL
Status: DISCONTINUED | OUTPATIENT
Start: 2023-11-03 | End: 2023-11-05 | Stop reason: HOSPADM

## 2023-11-03 RX ORDER — NICOTINE POLACRILEX 4 MG
15-30 LOZENGE BUCCAL
Status: DISCONTINUED | OUTPATIENT
Start: 2023-11-03 | End: 2023-11-03

## 2023-11-03 RX ORDER — OXYTOCIN/0.9 % SODIUM CHLORIDE 30/500 ML
340 PLASTIC BAG, INJECTION (ML) INTRAVENOUS CONTINUOUS PRN
Status: DISCONTINUED | OUTPATIENT
Start: 2023-11-03 | End: 2023-11-03

## 2023-11-03 RX ORDER — ONDANSETRON 2 MG/ML
4 INJECTION INTRAMUSCULAR; INTRAVENOUS EVERY 6 HOURS PRN
Status: DISCONTINUED | OUTPATIENT
Start: 2023-11-03 | End: 2023-11-03 | Stop reason: HOSPADM

## 2023-11-03 RX ORDER — PROCHLORPERAZINE 25 MG
25 SUPPOSITORY, RECTAL RECTAL EVERY 12 HOURS PRN
Status: DISCONTINUED | OUTPATIENT
Start: 2023-11-03 | End: 2023-11-03 | Stop reason: HOSPADM

## 2023-11-03 RX ORDER — MISOPROSTOL 200 UG/1
400 TABLET ORAL
Status: DISCONTINUED | OUTPATIENT
Start: 2023-11-03 | End: 2023-11-03 | Stop reason: HOSPADM

## 2023-11-03 RX ORDER — CARBOPROST TROMETHAMINE 250 UG/ML
250 INJECTION, SOLUTION INTRAMUSCULAR
Status: DISCONTINUED | OUTPATIENT
Start: 2023-11-03 | End: 2023-11-05 | Stop reason: HOSPADM

## 2023-11-03 RX ORDER — METHYLERGONOVINE MALEATE 0.2 MG/ML
200 INJECTION INTRAVENOUS
Status: DISCONTINUED | OUTPATIENT
Start: 2023-11-03 | End: 2023-11-05 | Stop reason: HOSPADM

## 2023-11-03 RX ORDER — FAMOTIDINE 20 MG/1
20 TABLET, FILM COATED ORAL 2 TIMES DAILY
Status: DISCONTINUED | OUTPATIENT
Start: 2023-11-03 | End: 2023-11-05 | Stop reason: HOSPADM

## 2023-11-03 RX ORDER — OXYTOCIN/0.9 % SODIUM CHLORIDE 30/500 ML
340 PLASTIC BAG, INJECTION (ML) INTRAVENOUS CONTINUOUS PRN
Status: DISCONTINUED | OUTPATIENT
Start: 2023-11-03 | End: 2023-11-05 | Stop reason: HOSPADM

## 2023-11-03 RX ORDER — METHYLERGONOVINE MALEATE 0.2 MG/ML
200 INJECTION INTRAVENOUS
Status: DISCONTINUED | OUTPATIENT
Start: 2023-11-03 | End: 2023-11-03 | Stop reason: HOSPADM

## 2023-11-03 RX ORDER — PROCHLORPERAZINE MALEATE 10 MG
10 TABLET ORAL EVERY 6 HOURS PRN
Status: DISCONTINUED | OUTPATIENT
Start: 2023-11-03 | End: 2023-11-03 | Stop reason: HOSPADM

## 2023-11-03 RX ORDER — MISOPROSTOL 200 UG/1
400 TABLET ORAL
Status: DISCONTINUED | OUTPATIENT
Start: 2023-11-03 | End: 2023-11-05 | Stop reason: HOSPADM

## 2023-11-03 RX ORDER — OXYTOCIN/0.9 % SODIUM CHLORIDE 30/500 ML
1-24 PLASTIC BAG, INJECTION (ML) INTRAVENOUS CONTINUOUS
Status: DISCONTINUED | OUTPATIENT
Start: 2023-11-03 | End: 2023-11-03 | Stop reason: HOSPADM

## 2023-11-03 RX ADMIN — KETOROLAC TROMETHAMINE 30 MG: 30 INJECTION, SOLUTION INTRAMUSCULAR at 17:01

## 2023-11-03 RX ADMIN — Medication 2 MILLI-UNITS/MIN: at 08:36

## 2023-11-03 RX ADMIN — FAMOTIDINE 20 MG: 20 TABLET ORAL at 21:29

## 2023-11-03 RX ADMIN — ACETAMINOPHEN 650 MG: 325 TABLET ORAL at 21:29

## 2023-11-03 RX ADMIN — LIDOCAINE HYDROCHLORIDE 10 ML: 10 INJECTION, SOLUTION EPIDURAL; INFILTRATION; INTRACAUDAL; PERINEURAL at 16:30

## 2023-11-03 RX ADMIN — DOCUSATE SODIUM 100 MG: 100 CAPSULE, LIQUID FILLED ORAL at 21:29

## 2023-11-03 RX ADMIN — SODIUM CHLORIDE, POTASSIUM CHLORIDE, SODIUM LACTATE AND CALCIUM CHLORIDE: 600; 310; 30; 20 INJECTION, SOLUTION INTRAVENOUS at 08:34

## 2023-11-03 ASSESSMENT — ACTIVITIES OF DAILY LIVING (ADL)
ADLS_ACUITY_SCORE: 18
ADLS_ACUITY_SCORE: 18
ADLS_ACUITY_SCORE: 35
ADLS_ACUITY_SCORE: 18

## 2023-11-03 NOTE — PROGRESS NOTES
Labor Progress Note        Patient is starting to feel more regular contractions.  SVE 7.5cm/90%/-1, membranes ruptured, clear fluid  Category I fetal tracing.    baseline, moderate variability, +acels, -decels  Regular contraction 1-3minutes        PATRICIA Hinton  [facility] Residency [facility] PGY2  Pager # [phone]    11/3/2023, 3:49 PM    Precepted patient with Dr. Beto Hu.

## 2023-11-03 NOTE — PROGRESS NOTES
1623 Delivery of female infant vaginally.  NICU in attendance for delivery.  Cord cut and infant brought to warmer.  APGARs 8/9.    1626 Delivery of placenta.
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
## 2023-11-03 NOTE — L&D DELIVERY NOTE
Delivery Summary  [facility]  Date of Service: 11/3/2023    Name      Rashmi Oliva         1995  MRN       [id number]  PCP        Emmie Moran at [facility], [phone].    DELIVERY NARRATIVE    On 11/3/2023 Rashmi Oliva delivered a viable female infant at 39w0d with apgars of 8 and 9 via  Delivery.    Rashmi presented to [facility] on 11/3/2023 for IOL.     Her group B Strep (GBS) carrier status was negative. She received IV pitocin for induction/augmentation. She received nothing for analgesia.    Delivery was via vaginal delivery to a sterile field under none  anesthesia. Infant delivered in ANTONI       position. Head delivered at the end of a contraction. Patient put in the Anette position, Suprapubic pressure applied by RN. After deliberate pushing and above maneuvers shoulder delivered within 45 seconds. The baby was placed on the warmer, NICU team present. Cord complications: none . Delayed cord clamping was not performed, infant assessed by NICU team.      Placenta delivered at 11/3/2023  4:26 PM . Placenta was noted to be intact. Fundal massage performed and fundus found to be firm. The following uterotonics were given: Pitocin (IV). Perineum, vagina, cervix were inspected, and the following lacerations were noted: 1st degree perineal. Lacerations were repaired in the usual fashion using 3-0 Vicryl. QBL: 240 mL.    Excellent hemostasis was noted. Needle and sponge count correct. Infant and patient in delivery room in good and stable condition.   _________________    GA: 39w0d  GP:   Labor Complications:    Additional Complications:    QBL:  240  Delivery Type:  vaginal delivery  Duration of Ruptured Membranes: 0h 53m   Weight: 3.544 kg (7 lb 13 oz)   Apgar scores: 8 , 9         Radha Oliva-Rashmi [[id number]]      Labor Event Times      Latent labor onset date/time: 11/3/2023 0836    Active labor onset  "date: 11/3/23 Onset time:  3:28 PM   Dilation complete date: 11/3/23 Complete time:  4:20 PM   Start pushing date/time: 11/3/2023 1620          Labor Events     labor?: No       Rupture date/time: 11/3/23 1530   Rupture type: Spontaneous Rupture of Membranes  Fluid color: Clear  Fluid odor: Normal       Delivery/Placenta Date and Time      Delivery Date: 11/3/23 Delivery Time:  4:23 PM   Placenta Date/Time: 11/3/2023  4:26 PM  Oxytocin given at the time of delivery: after delivery of baby  Delivering clinician: Beto Hu MD              Apgars    Living status: Living   1 Minute 5 Minute 10 Minute 15 Minute 20 Minute   Skin color: 0  1       Heart rate: 2  2       Reflex irritability: 2  2       Muscle tone: 2  2       Respiratory effort: 2  2       Total: 8  9       Apgars assigned by: DAVID        Measurements      Weight: 7 lb 13 oz Length: 1' 8\"     Head circumference: 35.6 cm           Delivery (Maternal) (Provider to Complete) ([id number])                 Blood Loss  Mother: Rashmi Oliva #[id number]     Start of Mother's Information      Delivery Blood Loss  23 1528 - 23 1746      Delivery QBL (mL) Hospital Encounter 154 mL    Postpartum QBL (mL) Hospital Encounter 86 mL    Total  240 mL               End of Mother's Information  Mother: Rashmi Oliva #[id number]                Delivery - Provider to Complete ([id number])    Delivering clinician: Beto Hu MD                                           Placenta    Date/Time: 11/3/2023  4:26 PM             Anesthesia    Method: None                           Completed by:   PATRICIA Hinton  [facility] Residency Program PGY2  Pager # [phone]    11/3/2023 4:48 PM   "

## 2023-11-03 NOTE — PROGRESS NOTES
0738 Patient and SO arrived.  Admitted into [facility] room 2.  Oriented to room, admission began.

## 2023-11-03 NOTE — PROGRESS NOTES
Labor Progress Note    Subjective:  Rashmi Oliva is a 28 year old yo  who was admitted for IOL.      Prenatal Complications: Gestational Diabetes managed with insulin.    Objective:  Temp:  [97.8  F (36.6  C)-98  F (36.7  C)] 97.8  F (36.6  C)  Pulse:  [70] 70  Resp:  [16] 16  BP: (109-117)/(66-80) 117/79  SpO2:  [98 %] 98 %  Gen: no acute distress, resting comfortably   CV: acyanotic   Abd: gravid, soft, nontender   Membrane status:    Category I fetal heart rate tracing  SVE 6.5 cm/90 %/ -1  Contraction Duration (seconds): 80-90      Fetal assessment:              Heart Rate baseline: 145              Variability: moderate               FHR Category: Category I      New labs results include:   Results for orders placed or performed during the hospital encounter of 23 (from the past 24 hour(s))   Glucose by meter   Result Value Ref Range    GLUCOSE BY METER POCT 95 70 - 99 mg/dL   Hemoglobin   Result Value Ref Range    Hemoglobin 11.6 (L) 11.7 - 15.7 g/dL   ABO/Rh type and screen    Narrative    The following orders were created for panel order ABO/Rh type and screen.  Procedure                               Abnormality         Status                     ---------                               -----------         ------                     Adult Type and Screen[[id number]]                            Final result                 Please view results for these tests on the individual orders.   Ketone Beta-Hydroxybutyrate Quantitative   Result Value Ref Range    Ketone (Beta-Hydroxybutyrate) Quantitative <0.18 <=0.30 mmol/L   Adult Type and Screen   Result Value Ref Range    ABO/RH(D) O POS     Antibody Screen Negative Negative    SPECIMEN EXPIRATION DATE [id number]    Extra Tube    Narrative    The following orders were created for panel order Extra Tube.  Procedure                               Abnormality         Status                     ---------                               -----------          ------                     Extra Blue Top Tube[[id number]]                              Final result                 Please view results for these tests on the individual orders.   Extra Blue Top Tube   Result Value Ref Range    Hold Specimen JIC    Glucose by meter   Result Value Ref Range    GLUCOSE BY METER POCT 89 70 - 99 mg/dL         Impression:  Rashmi Oliva is a 28 year old year old at 39w0d weeks IOL, currently in active labor     Plan:  Plan of AROM discussed with patient, will trial when cervix is further dilated and head is well applied.   SVE 6.5 cm/90 %/ -1  Continue expectant labor management  FHT Category I, FHR baseline 145, moderate variability, acels present, no decls   Contractions: regular 2-3 min  Continue regular glucose checks q2 hrs.        PATRICIA Hinton  [facility] Residency [facility] PGY2  Pager # [phone]      11/3/2023, 1:06 PM    Precepted patient with Dr. Beto Hu.

## 2023-11-04 LAB
GLUCOSE BLDC GLUCOMTR-MCNC: 129 MG/DL (ref 70–99)
GLUCOSE BLDC GLUCOMTR-MCNC: 147 MG/DL (ref 70–99)
GLUCOSE BLDC GLUCOMTR-MCNC: 185 MG/DL (ref 70–99)
GLUCOSE BLDC GLUCOMTR-MCNC: 88 MG/DL (ref 70–99)
HGB BLD-MCNC: 10.3 G/DL (ref 11.7–15.7)

## 2023-11-04 PROCEDURE — 250N000013 HC RX MED GY IP 250 OP 250 PS 637

## 2023-11-04 PROCEDURE — 36415 COLL VENOUS BLD VENIPUNCTURE: CPT

## 2023-11-04 PROCEDURE — 120N000001 HC R&B MED SURG/OB

## 2023-11-04 PROCEDURE — 85018 HEMOGLOBIN: CPT

## 2023-11-04 RX ADMIN — IBUPROFEN 800 MG: 800 TABLET ORAL at 10:27

## 2023-11-04 RX ADMIN — FAMOTIDINE 20 MG: 20 TABLET ORAL at 21:25

## 2023-11-04 RX ADMIN — FAMOTIDINE 20 MG: 20 TABLET ORAL at 08:17

## 2023-11-04 RX ADMIN — ACETAMINOPHEN 650 MG: 325 TABLET ORAL at 13:56

## 2023-11-04 RX ADMIN — DOCUSATE SODIUM 100 MG: 100 CAPSULE, LIQUID FILLED ORAL at 08:17

## 2023-11-04 RX ADMIN — IBUPROFEN 800 MG: 800 TABLET ORAL at 18:40

## 2023-11-04 RX ADMIN — ACETAMINOPHEN 650 MG: 325 TABLET ORAL at 08:22

## 2023-11-04 RX ADMIN — IBUPROFEN 800 MG: 800 TABLET ORAL at 02:18

## 2023-11-04 ASSESSMENT — ACTIVITIES OF DAILY LIVING (ADL)
ADLS_ACUITY_SCORE: 18

## 2023-11-04 NOTE — PLAN OF CARE
Problem: Postpartum (Vaginal Delivery)  Goal: Successful Parent Role Transition  Outcome: Progressing  Intervention: Support Parent Role Transition  Recent Flowsheet Documentation  Taken 2023 1000 by Rashmi Milton RN  Supportive Measures:   active listening utilized   self-care encouraged     Problem: Diabetes in Pregnancy  Goal: Optimal Coping  Outcome: Progressing  Intervention: Support Wellbeing and Self-Management Success  Recent Flowsheet Documentation  Taken 2023 1000 by Rashmi Milton RN  Supportive Measures:   active listening utilized   self-care encouraged  Family/Support System Care:   self-care encouraged   support provided   presence promoted      Pt. Bonding well and attentive to needs with . Pt is formula feeding . Pt. Is participating in cares with . Pt. Blood sugar 88 and 185 this shift. Pt. C/o cramping in abdomen. Pt. Received PRN tylenol and PRN ibuprofen for pain with effectiveness.

## 2023-11-04 NOTE — SIGNIFICANT EVENT
Significant Event Note    Time of event: 8:18 PM November 3, 2023    Description of event:  Notified of no postpartum orders in yet. Will place them. Continue blood glucose monitoring for now.     Plan:  Postpartum orders placed    Discussed with: bedside nurse    Juliana Zimmerman MD

## 2023-11-04 NOTE — PLAN OF CARE
"  Problem: Adult Inpatient Plan of Care  Goal: Plan of Care Review  Description: The Plan of Care Review/Shift note should be completed every shift.  The Outcome Evaluation is a brief statement about your assessment that the patient is improving, declining, or no change.  This information will be displayed automatically on your shift  note.  Outcome: Progressing  Flowsheets (Taken 11/4/2023 0253)  Plan of Care Reviewed With: patient  Goal: Patient-Specific Goal (Individualized)  Description: You can add care plan individualizations to a care plan. Examples of Individualization might be:  \"Parent requests to be called daily at 9am for status\", \"I have a hard time hearing out of my right ear\", or \"Do not touch me to wake me up as it startles  me\".  Outcome: Progressing  Goal: Absence of Hospital-Acquired Illness or Injury  Outcome: Progressing  Intervention: Identify and Manage Fall Risk  Recent Flowsheet Documentation  Taken 11/4/2023 0218 by Carola Atkinson RN  Safety Promotion/Fall Prevention: activity supervised  Intervention: Prevent Skin Injury  Recent Flowsheet Documentation  Taken 11/4/2023 0218 by Carola Atkinson RN  Body Position: position changed independently  Goal: Optimal Comfort and Wellbeing  Outcome: Progressing  Intervention: Provide Person-Centered Care  Recent Flowsheet Documentation  Taken 11/4/2023 0218 by Carola Atkinson RN  Trust Relationship/Rapport: care explained  Goal: Readiness for Transition of Care  Outcome: Progressing   Goal Outcome Evaluation:      Plan of Care Reviewed With: patient      Pt is doing well. Voiding goo amount of urine with no problems.  Fundus firm at -2, Lochia slight. Using Ibuprofen and tylenol for pain management. Will continue to monitor.         "

## 2023-11-04 NOTE — PROGRESS NOTES
"Postpartum Day 1: Vaginal Delivery    Subjective:  The patient feels well. The patient has no emotional concerns. Pain is well controlled with current medications. The patient is ambulating well. She is tolerating a normal diet, voiding, and passing flatus.The amount and color of the lochia is appropriate for the duration of recovery, patient denies clots. The baby is well. Bottle feeding, going well.     ROS: Patient reports no headaches, vision changes, light-headedness/dizziness, chest pain, RUQ pain, lower extremity edema.      Objective   /64 (BP Location: Left arm)   Pulse 65   Temp 97.7  F (36.5  C) (Oral)   Resp 16   Ht 1.575 m (5' 2\")   LMP 2023   SpO2 95%   Breastfeeding Unknown   BMI 36.21 kg/m    General: in no acute distress  Abdomen: soft, NT, fundus below umbilicus and firm  : deferred  Ext: no edema  Skin: good color    Impression:   28 year old  female who delivered via augmented vaginal delivery at 39w0d, currently doing well postpartum.    Plan:   -continue current care  -anticipate home tomorrow  -contraception TBD  -postpartum GDM order. MSSI  -f/u in 2 weeks with PATRICIA Gordon  [facility] Residency Program PGY2  Pager # [phone]      Precepted patient with Dr. Beto Hu.   "

## 2023-11-04 NOTE — PLAN OF CARE
"Goal Outcome Evaluation: Progressing      Plan of Care Reviewed With: patient    Overall Patient Progress: improvingOverall Patient Progress: improving    Patient's VSS. Fundus is firm 1 cm below umbilicus with light lochia. Took acetaminophen this shift for pain, also utilizing tucks pads, ice packs, and juanita bottle. Bedtime blood glucose 162, no insulin given per orders. Had 1 unmeasured urine occurrence, educated to call RN in to measure next 2 voids, patient verbalized an understanding. Patient's significant other is in the room with her and infant. IV saline locked.    /59 (BP Location: Right arm, Patient Position: Semi-Tavera's, Cuff Size: Adult Regular)   Pulse 73   Temp 98  F (36.7  C) (Oral)   Resp 18   Ht 1.575 m (5' 2\")   LMP 02/03/2023   SpO2 97%   Breastfeeding Unknown   BMI 36.21 kg/m      ZOILA HALL RN on 11/3/2023 at 11:25 PM      Problem: Postpartum (Vaginal Delivery)  Goal: Effective Urinary Elimination  Outcome: Progressing     Problem: Adult Inpatient Plan of Care  Goal: Optimal Comfort and Wellbeing  Outcome: Progressing          "

## 2023-11-04 NOTE — DISCHARGE INSTRUCTIONS
Postpartum Vaginal Delivery Instructions    Activity     Ask family and friends for help when you need it.  Do not place anything in your vagina for 6 weeks.  You are not restricted on other activities, but take it easy for a few weeks to allow your body to recover from delivery.  You are able to do any activities you feel up to that point.  No driving until you have stopped taking your pain medications (usually two weeks after delivery).     Call your health care provider if you have any of these symptoms:     Increased pain, swelling, redness, or fluid around your stiches from an episiotomy or perineal tear.  A fever above 100.4 F (38 C) with or without chills when placing a thermometer under your tongue.  You soak a sanitary pad with blood within 1 hour, or you see blood clots larger than a golf ball.  Bleeding that lasts more than 6 weeks.  Vaginal discharge that smells bad.  Severe pain, cramping or tenderness in your lower belly area.  A need to urinate more frequently (use the toilet more often), more urgently (use the toilet very quickly), or it burns when you urinate.  Nausea and vomiting.  Redness, swelling or pain around a vein in your leg.  Problems breastfeeding or a red or painful area on your breast.  Chest pain and cough or are gasping for air.  Problems coping with sadness, anxiety, or depression.  If you have any concerns about hurting yourself or the baby, call your provider immediately.   You have questions or concerns after you return home.     Keep your hands clean:  Always wash your hands before touching your perineal area and stitches.  This helps reduce your risk of infection.  If your hands aren't dirty, you may use an alcohol hand-rub to clean your hands. Keep your nails clean and short.        Warning Signs after Having a Baby    Keep this paper on your fridge or somewhere else where you can see it.    Call your provider if you have any of these symptoms up to 12 weeks after having your  baby.    Thoughts of hurting yourself or your baby  Pain in your chest or trouble breathing  Severe headache not helped by pain medicine  Eyesight concerns (blurry vision, seeing spots or flashes of light, other changes to eyesight)  Fainting, shaking or other signs of a seizure    Call 9-1-1 if you feel that it is an emergency.     The symptoms below can happen to anyone after giving birth. They can be very serious. Call your provider if you have any of these warning signs.    My provider s phone number: _______________________    Losing too much blood (hemorrhage)    Call your provider if you soak through a pad in less than an hour or pass blood clots bigger than a golf ball. These may be signs that you are bleeding too much.    Blood clots in the legs or lungs    After you give birth, your body naturally clots its blood to help prevent blood loss. Sometimes this increased clotting can happen in other areas of the body, like the legs or lungs. This can block your blood flow and be very dangerous.     Call your provider if you:  Have a red, swollen spot on the back of your leg that is warm or painful when you touch it.   Are coughing up blood.     Infection    Call your provider if you have any of these symptoms:  Fever of 100.4 F (38 C) or higher.  Pain or redness around your stitches if you had an incision.   Any yellow, white, or green fluid coming from places where you had stitches or surgery.    Mood Problems (postpartum depression)    Many people feel sad or have mood changes after having a baby. But for some people, these mood swings are worse.     Call your provider right away if you feel so anxious or nervous that you can't care for yourself or your baby.    Preeclampsia (high blood pressure)    Even if you didn't have high blood pressure when you were pregnant, you are at risk for the high blood pressure disease called preeclampsia. This risk can last up to 12 weeks after giving birth.     Call your  provider if you have:   Pain on your right side under your rib cage  Sudden swelling in the hands and face    Remember: You know your body. If something doesn't feel right, get medical help.     For informational purposes only. Not to replace the advice of your health care provider. Copyright 2020 [employer]. All rights reserved. Clinically reviewed by Sushma Choi, RNC-OB, MSN. iogyn [id number] - Rev 02/23.

## 2023-11-04 NOTE — PLAN OF CARE
Goal Outcome Evaluation:    Patient got up to bathroom, was able to void.  Reported good pain control after given IV toradol.  Fundus firm, midline and 2 below umbilicus.

## 2023-11-05 VITALS
TEMPERATURE: 98 F | RESPIRATION RATE: 20 BRPM | SYSTOLIC BLOOD PRESSURE: 132 MMHG | HEIGHT: 62 IN | DIASTOLIC BLOOD PRESSURE: 77 MMHG | BODY MASS INDEX: 36.21 KG/M2 | HEART RATE: 74 BPM | OXYGEN SATURATION: 98 %

## 2023-11-05 LAB — GLUCOSE BLDC GLUCOMTR-MCNC: 75 MG/DL (ref 70–99)

## 2023-11-05 PROCEDURE — 250N000013 HC RX MED GY IP 250 OP 250 PS 637

## 2023-11-05 RX ADMIN — FAMOTIDINE 20 MG: 20 TABLET ORAL at 07:57

## 2023-11-05 RX ADMIN — DOCUSATE SODIUM 100 MG: 100 CAPSULE, LIQUID FILLED ORAL at 07:58

## 2023-11-05 RX ADMIN — IBUPROFEN 800 MG: 800 TABLET ORAL at 00:59

## 2023-11-05 RX ADMIN — ACETAMINOPHEN 650 MG: 325 TABLET ORAL at 04:34

## 2023-11-05 RX ADMIN — IBUPROFEN 800 MG: 800 TABLET ORAL at 06:56

## 2023-11-05 ASSESSMENT — ACTIVITIES OF DAILY LIVING (ADL)
ADLS_ACUITY_SCORE: 18

## 2023-11-05 NOTE — PLAN OF CARE
Problem: Postpartum (Vaginal Delivery)  Goal: Optimal Pain Control and Function  Outcome: Progressing      Problem: Diabetes in Pregnancy  Goal: Blood Glucose Level Within Targeted Range  Outcome: Progressing       Problem: Postpartum (Vaginal Delivery)  Goal: Successful Parent Role Transition  Outcome: Progressing  Intervention: Support Parent Role Transition  Recent Flowsheet Documentation  Taken 2023 0914 by Rashmi Milton RN  Supportive Measures:   active listening utilized   self-care encouraged     Pt. Bonding well with . Pt. Attentive to  needs. Pt. Blood glucose 75 this AM. No coverage needed.  Uterus firm. Scant bleeding. Pt. Rated pain 3/10 and stated pain was tolerable. Pt. Expecting to be discharged to home today.

## 2023-11-05 NOTE — DISCHARGE SUMMARY
"OB Discharge Summary  Date:  2023    Name:  Rashmi Oliva  :  1995  MRN:  [id number]    Admission Date:  11/3/2023  Delivery Date:  11/3/2023  4:23 PM   Gestational Age at Delivery:  39w0d  Discharge Date:      Principal Diagnosis:    Problem List Items Addressed This Visit          Other    * (Principal) Normal labor and delivery - Primary    Relevant Orders    Breast pump - Manual/Electric     Other Diagnosis:  None    Conditions complicating Pregnancy: Diabetes:  Gestational    Procedure(s) Performed:      Indication for Induction:  Gestational DM     Condition at Discharge:  good    Examination:  /69   Pulse 67   Temp 98.3  F (36.8  C)   Resp 20   Ht 1.575 m (5' 2\")   LMP 2023   SpO2 97%   Breastfeeding Unknown   BMI 36.21 kg/m     Gen: alert, normal interactions and behaviors  Lungs: breathing comfortably on room air  ABD: Fundus firm below umbilicus, active bowel sounds  Ext: no lower extremity swelling, 2+ peripheral pulses    Discharge Medications:      Review of your medicines        UNREVIEWED medicines. Ask your doctor about these medicines        Dose / Directions   cetirizine 10 MG tablet  Commonly known as: zyrTEC  Used for: Itching      Dose: 5 mg  Take 0.5 tablets (5 mg) by mouth daily as needed for allergies (itching)  Quantity: 30 tablet  Refills: 0     famotidine 20 MG tablet  Commonly known as: PEPCID  Used for: Heartburn      Dose: 20 mg  Take 1 tablet (20 mg) by mouth 2 times daily for 120 days  Quantity: 60 tablet  Refills: 3     insulin aspart 100 UNIT/ML pen  Commonly known as: NovoLOG PEN  Used for: Insulin controlled gestational diabetes mellitus (GDM) in third trimester      Dose: 5 Units  Inject 5 Units Subcutaneous 3 times daily (with meals)  Quantity: 15 mL  Refills: 0     insulin glargine 100 UNIT/ML pen  Commonly known as: LANTUS PEN  Used for: Gestational diabetes mellitus (GDM) in second trimester, gestational diabetes method of control " unspecified      Dose: 8 Units  Inject 8 Units Subcutaneous At Bedtime  Quantity: 1.2 mL  Refills: 0     polyethylene glycol 17 g packet  Commonly known as: MIRALAX      Dose: 1 packet  Take 1 packet by mouth daily as needed for constipation  Refills: 0     prenatal multivitamin w/iron 27-0.8 MG tablet  Used for: Pregnancy test positive      Dose: 1 tablet  Take 1 tablet by mouth daily  Quantity: 90 tablet  Refills: 3     pyridOXINE 25 MG tablet  Commonly known as: VITAMIN B6      Dose: 25 mg  Take 25 mg by mouth daily as needed (nausea)  Refills: 0     terbinafine 1 % external cream  Commonly known as: lamISIL  Used for: Fungal infection of the groin      Apply topically 2 times daily  Quantity: 15 g  Refills: 3            CONTINUE these medicines which have NOT CHANGED        Dose / Directions   blood glucose monitoring lancets  Used for: Gestational diabetes mellitus (GDM) in second trimester, gestational diabetes method of control unspecified      Use to test blood sugar 3 times daily or as directed.  Quantity: 1 each  Refills: 1     blood glucose test strip  Commonly known as: NO BRAND SPECIFIED  Used for: Gestational diabetes mellitus (GDM) in second trimester, gestational diabetes method of control unspecified      Use to test blood sugar 3 times daily or as directed.  Quantity: 100 strip  Refills: 3     insulin pen needle 32G X 4 MM miscellaneous  Commonly known as: 32G X 4 MM  Used for: Gestational diabetes mellitus (GDM) in second trimester, gestational diabetes method of control unspecified      Use 1 pen needles daily or as directed.  Quantity: 50 each  Refills: 0            Discharge Plan:    Follow up with Dr. Luong in 2 weeks   Patient Instructions:      Physical activity: Regular    Diet:  Normal    Medication:  Ibuprofen as needed    Kathleen Baron MD  [facility], PGY-3  Contact via [facility].    Precepted patient with Dr. Beto Hu.

## 2023-11-05 NOTE — PLAN OF CARE
Problem: Postpartum (Vaginal Delivery)  Goal: Successful Parent Role Transition  Outcome: Progressing  Intervention: Support Parent Role Transition  Recent Flowsheet Documentation  Taken 11/4/2023 2122 by Marianela Bridges RN  Supportive Measures:   active listening utilized   self-care encouraged   Goal Outcome Evaluation:         VSS, insulin at bedtime was 147, no insulin given. Uterus firm, bleeding scant. Ibuprofen given for uterine cramping. Attentive to baby and cues.

## 2023-11-07 ENCOUNTER — PATIENT OUTREACH (OUTPATIENT)
Dept: CARE COORDINATION | Facility: CLINIC | Age: 28
End: 2023-11-07
Payer: COMMERCIAL

## 2023-11-07 NOTE — PROGRESS NOTES
[facility] Contact  [facility]/Voicemail     Clinical Data: Post-Discharge Outreach     Outreach attempted x 2.  No answer no voice mail and unable to leave message.      Plan:  [facility] will do no further outreaches at this time.       Kathleen Sheets RN  [facility] [place], [facility]    *Connected Care Resource Team does NOT follow patient ongoing. Referrals are identified based on internal discharge reports and the outreach is to ensure patient has an understanding of their discharge instructions.

## 2023-11-17 ENCOUNTER — OFFICE VISIT (OUTPATIENT)
Dept: FAMILY MEDICINE | Facility: CLINIC | Age: 28
End: 2023-11-17
Payer: COMMERCIAL

## 2023-11-17 ENCOUNTER — MEDICAL CORRESPONDENCE (OUTPATIENT)
Dept: HEALTH INFORMATION MANAGEMENT | Facility: CLINIC | Age: 28
End: 2023-11-17

## 2023-11-17 VITALS
WEIGHT: 180 LBS | SYSTOLIC BLOOD PRESSURE: 119 MMHG | OXYGEN SATURATION: 99 % | BODY MASS INDEX: 32.92 KG/M2 | HEART RATE: 85 BPM | DIASTOLIC BLOOD PRESSURE: 77 MMHG | TEMPERATURE: 98 F

## 2023-11-17 PROCEDURE — 99207 PR POST PARTUM EXAM: CPT | Mod: GC

## 2023-11-17 NOTE — PROGRESS NOTES
Preceptor Attestation:   Patient seen, evaluated and discussed with the resident Dr. Harsh Luong and medical student Vannessa Packer. I have verified the content of the note, which accurately reflects my assessment of the patient and the plan of care.    Supervising Physician:  Benjamin Rosenstein, MD, MA  [facility] Faculty  [facility]

## 2023-11-17 NOTE — PROGRESS NOTES
Assessment & Plan     Routine postpartum follow-up  29 yo  here for a 2 week postpartum follow-up. Pregnancy was complicated by gestational diabetes, managed with insulin.  on 11/3 with a first degree perineal laceration repaired. Currently doing well. No urinary symptoms or vaginal bleeding. Feels well supported. [place] score 0. Would like to start OCPs at 6 weeks postpartum.   - Follow-up in 1 mo to start OCPs and GTT    Return in about 4 weeks (around 12/15/2023) for Follow up.    Patient seen and discussed with resident, Harsh Luong MD.  Vannessa Packer, MS3    Resident attestation   I was present with the medical student who participated in the service and in the documentation of this note. I have verified the history and personally performed the physical exam and medical decision making, and have verified the content of the note, which accurately reflects my assessment of the patient and the plan of care.   PATRICIA Hinton  [facility] PGY2      Subjective   Rashmi is a 28 year old, presenting for the following health issues:  Post Partum Exam (Post partum)    HPI   Overall doing well  Very busy but has support with  and in-laws  No mood concerns  No urinary symptoms or bleeding   Continuing to take prenatals  Formula feeding  No sexual activity for 6-8 weeks   Discussed coming back in for diabetes test and birth control at 6 weeks postpartum      Objective    /77   Pulse 85   Temp 98  F (36.7  C) (Oral)   Wt 81.6 kg (180 lb)   LMP 2023   SpO2 99%   BMI 32.92 kg/m    Body mass index is 32.92 kg/m .    Physical Exam   GENERAL: healthy, alert and no distress  RESP: speaking in full sentences, normal work of breathing   CV: regular rate and rhythm, normal S1 S2,  no murmur, extremities appear well perfused  ABDOMEN: nondistended   MS: no gross musculoskeletal defects noted  PSYCH: mentation appears normal, affect normal/bright

## 2023-12-04 ENCOUNTER — MEDICAL CORRESPONDENCE (OUTPATIENT)
Dept: HEALTH INFORMATION MANAGEMENT | Facility: CLINIC | Age: 28
End: 2023-12-04
Payer: COMMERCIAL

## 2023-12-29 ENCOUNTER — OFFICE VISIT (OUTPATIENT)
Dept: FAMILY MEDICINE | Facility: CLINIC | Age: 28
End: 2023-12-29
Payer: COMMERCIAL

## 2023-12-29 ENCOUNTER — MEDICAL CORRESPONDENCE (OUTPATIENT)
Dept: HEALTH INFORMATION MANAGEMENT | Facility: CLINIC | Age: 28
End: 2023-12-29

## 2023-12-29 VITALS
HEART RATE: 68 BPM | BODY MASS INDEX: 32.07 KG/M2 | RESPIRATION RATE: 20 BRPM | DIASTOLIC BLOOD PRESSURE: 83 MMHG | WEIGHT: 181 LBS | OXYGEN SATURATION: 97 % | TEMPERATURE: 98 F | HEIGHT: 63 IN | SYSTOLIC BLOOD PRESSURE: 128 MMHG

## 2023-12-29 DIAGNOSIS — O24.414 INSULIN CONTROLLED GESTATIONAL DIABETES MELLITUS (GDM) DURING PREGNANCY, ANTEPARTUM: ICD-10-CM

## 2023-12-29 DIAGNOSIS — Z30.019 ENCOUNTER FOR FEMALE BIRTH CONTROL: ICD-10-CM

## 2023-12-29 PROCEDURE — 99207 PR POST PARTUM EXAM: CPT | Mod: GC

## 2023-12-29 RX ORDER — NORGESTIMATE AND ETHINYL ESTRADIOL 0.25-0.035
1 KIT ORAL DAILY
Qty: 84 TABLET | Refills: 3 | Status: SHIPPED | OUTPATIENT
Start: 2023-12-29 | End: 2024-11-29

## 2023-12-29 NOTE — PROGRESS NOTES
Assessment & Plan     (Z39.2) Routine postpartum follow-up  (primary encounter diagnosis)  Comment: feeling overall well. Here for postpartum 6 week check up. Has been back to work recently for three days prior to holiday break. She is not breastfeeding and wants to start ocp as discussed below. [place] score 2. Mood stable.   Plan: will follow up lab only for A1c.       (O24.414) Insulin controlled gestational diabetes mellitus (GDM) during pregnancy, antepartum  Comment: GDM during last 3 pregnancies. Most recently GDM on insulin and was difficult to control and she was induced at 39 weeks. She will follow up for A1c testing at 14 weeks postpartum.   Plan: Hemoglobin A1c            (Z30.019) Encounter for female birth control  Comment: discussed different options of birth control today. Nicole reviewed [facility] and decided on ocp. LMP 23.  Plan: norgestimate-ethinyl estradiol (ORTHO-CYCLEN)         0.25-35 MG-MCG tablet               PATRICIA Hinton PGY2  [facility]    Parag Caraballo is a 28 year old, presenting for the following health issues:  Postpartum Care        2023     8:19 AM   Additional Questions   Roomed by hskan say   Accompanied by self       HPI   Nicole is here for 6 week postpartum visit. She states feeling overall well. Still tired because baby stays mostly awake at night. She got back to work for three days before taking time off for the holiday season.  takes care of  while she is at work. She is not breastfeeding and wants to discuss birth control options, she is mostly interested in ocp. LMP 23. Last time she tried ocp in 2017. Had weight gain with the dep injections and had difficult time with nexplanon removal few years ago. During her pregnancy she was diagnosed with GDM. She prefers A1c test in 6 weeks and is not interested in OGT today.       Review of Systems   Constitutional, HEENT, cardiovascular, pulmonary, gi and  "gu systems are negative, except as otherwise noted.      Objective    /83   Pulse 68   Temp 98  F (36.7  C)   Resp 20   Ht 1.6 m (5' 3\")   Wt 82.1 kg (181 lb)   LMP 12/24/2023   SpO2 97%   BMI 32.06 kg/m    Body mass index is 32.06 kg/m .  Physical Exam   GENERAL: healthy, alert and no distress  RESP: lungs clear to auscultation - no rales, rhonchi or wheezes  CV: regular rate and rhythm, normal S1 S2, no murmur  ABDOMEN: soft, nontender, no masses and bowel sounds normal  MS: no gross musculoskeletal defects noted, no edema          "

## 2024-01-03 ENCOUNTER — TELEPHONE (OUTPATIENT)
Dept: FAMILY MEDICINE | Facility: CLINIC | Age: 29
End: 2024-01-03
Payer: COMMERCIAL

## 2024-01-03 NOTE — TELEPHONE ENCOUNTER
Forms Request  Name of form/paperwork: RETURN TO WORK MEDICAL EVALUATION FORM  Do we have the form: Y   When is form needed by: ASAP   How would you like the form returned: FAXED  Fax: [phone]  Patient Notified form requests are processed in 10 business days: Y  Okay to leave a detailed message? Y

## 2024-01-19 ENCOUNTER — MEDICAL CORRESPONDENCE (OUTPATIENT)
Dept: HEALTH INFORMATION MANAGEMENT | Facility: CLINIC | Age: 29
End: 2024-01-19
Payer: COMMERCIAL

## 2024-02-05 ENCOUNTER — LAB (OUTPATIENT)
Dept: LAB | Facility: CLINIC | Age: 29
End: 2024-02-05
Payer: COMMERCIAL

## 2024-02-05 DIAGNOSIS — O24.414 INSULIN CONTROLLED GESTATIONAL DIABETES MELLITUS (GDM) DURING PREGNANCY, ANTEPARTUM: ICD-10-CM

## 2024-02-05 LAB — HBA1C MFR BLD: 5.9 % (ref 0–5.6)

## 2024-02-05 PROCEDURE — 83036 HEMOGLOBIN GLYCOSYLATED A1C: CPT

## 2024-02-05 PROCEDURE — 36415 COLL VENOUS BLD VENIPUNCTURE: CPT

## 2024-07-06 ENCOUNTER — HEALTH MAINTENANCE LETTER (OUTPATIENT)
Age: 29
End: 2024-07-06

## 2025-02-10 ENCOUNTER — OFFICE VISIT (OUTPATIENT)
Dept: FAMILY MEDICINE | Facility: CLINIC | Age: 30
End: 2025-02-10
Payer: COMMERCIAL

## 2025-02-10 VITALS
DIASTOLIC BLOOD PRESSURE: 84 MMHG | SYSTOLIC BLOOD PRESSURE: 133 MMHG | WEIGHT: 205 LBS | TEMPERATURE: 98.2 F | HEIGHT: 62 IN | BODY MASS INDEX: 37.73 KG/M2 | OXYGEN SATURATION: 97 % | HEART RATE: 74 BPM | RESPIRATION RATE: 18 BRPM

## 2025-02-10 DIAGNOSIS — Z11.1 SCREENING EXAMINATION FOR PULMONARY TUBERCULOSIS: Primary | ICD-10-CM

## 2025-02-10 NOTE — PROGRESS NOTES
"  Assessment & Plan     Screening examination for pulmonary tuberculosis  Recent travel to [place] where there is a known outbreak.  No known contacts and asymptomatic.  Requests Tb screening.  - Quantiferon TB Gold Plus    Preventative care  Last PAP in 2022, due for annual wellness visit, she will call to schedule this.  Declines vaccines today.        Parag Caraballo is a 29 year old, presenting for the following health issues:  TB testing  (No other concern. 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 No           Objective    /84 (BP Location: Right arm, Patient Position: Sitting)   Pulse 74   Temp 98.2  F (36.8  C) (Oral)   Resp 18   Ht 1.575 m (5' 2.01\")   Wt 93 kg (205 lb)   LMP 01/15/2025 (Exact Date)   SpO2 97%   BMI 37.49 kg/m    Body mass index is 37.49 kg/m .  Physical Exam     GENERAL: healthy, alert and no distress  RESP: speaking in full sentences, normal work of breathing   CV: extremities appear well perfused  MS: no gross musculoskeletal defects noted  PSYCH: mentation appears normal, affect normal/bright             Signed Electronically by: Arvin Harmon MD    "

## 2025-02-12 LAB
GAMMA INTERFERON BACKGROUND BLD IA-ACNC: 0.01 IU/ML
M TB IFN-G BLD-IMP: NEGATIVE
M TB IFN-G CD4+ BCKGRND COR BLD-ACNC: 2.96 IU/ML
MITOGEN IGNF BCKGRD COR BLD-ACNC: 0 IU/ML
MITOGEN IGNF BCKGRD COR BLD-ACNC: 0 IU/ML
QUANTIFERON MITOGEN: 2.97 IU/ML
QUANTIFERON NIL TUBE: 0.01 IU/ML
QUANTIFERON TB1 TUBE: 0.01 IU/ML
QUANTIFERON TB2 TUBE: 0.01

## 2025-02-12 NOTE — PROGRESS NOTES
Preceptor Attestation:   Patient seen, evaluated and discussed with the resident. I have verified the content of the note, which accurately reflects my assessment of the patient and the plan of care.   Supervising Physician:  Teto Pete MD

## 2025-07-13 ENCOUNTER — HEALTH MAINTENANCE LETTER (OUTPATIENT)
Age: 30
End: 2025-07-13